# Patient Record
Sex: MALE | Race: WHITE | NOT HISPANIC OR LATINO
[De-identification: names, ages, dates, MRNs, and addresses within clinical notes are randomized per-mention and may not be internally consistent; named-entity substitution may affect disease eponyms.]

---

## 2020-01-03 ENCOUNTER — RECORD ABSTRACTING (OUTPATIENT)
Age: 85
End: 2020-01-03

## 2020-01-03 DIAGNOSIS — Z87.448 PERSONAL HISTORY OF OTHER DISEASES OF URINARY SYSTEM: ICD-10-CM

## 2020-01-03 DIAGNOSIS — M54.16 RADICULOPATHY, LUMBAR REGION: ICD-10-CM

## 2020-01-03 DIAGNOSIS — R39.9 UNSPECIFIED SYMPTOMS AND SIGNS INVOLVING THE GENITOURINARY SYSTEM: ICD-10-CM

## 2020-01-03 DIAGNOSIS — Z87.09 PERSONAL HISTORY OF OTHER DISEASES OF THE RESPIRATORY SYSTEM: ICD-10-CM

## 2020-01-03 DIAGNOSIS — R41.3 OTHER AMNESIA: ICD-10-CM

## 2020-01-03 DIAGNOSIS — I10 ESSENTIAL (PRIMARY) HYPERTENSION: ICD-10-CM

## 2020-01-03 DIAGNOSIS — Z86.2 PERSONAL HISTORY OF DISEASES OF THE BLOOD AND BLOOD-FORMING ORGANS AND CERTAIN DISORDERS INVOLVING THE IMMUNE MECHANISM: ICD-10-CM

## 2020-01-03 DIAGNOSIS — F33.9 MAJOR DEPRESSIVE DISORDER, RECURRENT, UNSPECIFIED: ICD-10-CM

## 2020-01-03 DIAGNOSIS — K21.9 GASTRO-ESOPHAGEAL REFLUX DISEASE W/OUT ESOPHAGITIS: ICD-10-CM

## 2020-01-03 DIAGNOSIS — M96.1 POSTLAMINECTOMY SYNDROME, NOT ELSEWHERE CLASSIFIED: ICD-10-CM

## 2020-01-03 DIAGNOSIS — M79.605 PAIN IN RIGHT LEG: ICD-10-CM

## 2020-01-03 DIAGNOSIS — I87.8 OTHER SPECIFIED DISORDERS OF VEINS: ICD-10-CM

## 2020-01-03 DIAGNOSIS — F45.8 OTHER SOMATOFORM DISORDERS: ICD-10-CM

## 2020-01-03 DIAGNOSIS — Z87.438 PERSONAL HISTORY OF OTHER DISEASES OF MALE GENITAL ORGANS: ICD-10-CM

## 2020-01-03 DIAGNOSIS — Z86.69 PERSONAL HISTORY OF OTHER DISEASES OF THE NERVOUS SYSTEM AND SENSE ORGANS: ICD-10-CM

## 2020-01-03 DIAGNOSIS — Z91.81 HISTORY OF FALLING: ICD-10-CM

## 2020-01-03 DIAGNOSIS — I48.91 UNSPECIFIED ATRIAL FIBRILLATION: ICD-10-CM

## 2020-01-03 DIAGNOSIS — R60.9 EDEMA, UNSPECIFIED: ICD-10-CM

## 2020-01-03 DIAGNOSIS — Z87.19 PERSONAL HISTORY OF OTHER DISEASES OF THE DIGESTIVE SYSTEM: ICD-10-CM

## 2020-01-03 DIAGNOSIS — Z87.898 PERSONAL HISTORY OF OTHER SPECIFIED CONDITIONS: ICD-10-CM

## 2020-01-03 DIAGNOSIS — M48.061 SPINAL STENOSIS, LUMBAR REGION WITHOUT NEUROGENIC CLAUDICATION: ICD-10-CM

## 2020-01-03 DIAGNOSIS — M79.604 PAIN IN RIGHT LEG: ICD-10-CM

## 2020-01-03 PROBLEM — Z00.00 ENCOUNTER FOR PREVENTIVE HEALTH EXAMINATION: Status: ACTIVE | Noted: 2020-01-03

## 2020-01-03 LAB
PSA FREE FLD-MCNC: 25
PSA FREE SERPL-MCNC: 0.5
PSA SERPL-MCNC: 2
TESTOST BND SERPL-MCNC: 202.1

## 2020-01-03 RX ORDER — FLUTICASONE PROPIONATE 50 UG/1
50 SPRAY, METERED NASAL
Refills: 0 | Status: ACTIVE | COMMUNITY

## 2020-01-03 RX ORDER — TAMSULOSIN HYDROCHLORIDE 0.4 MG/1
0.4 CAPSULE ORAL
Refills: 0 | Status: ACTIVE | COMMUNITY

## 2020-01-03 RX ORDER — HYDROXYZINE HYDROCHLORIDE 10 MG/5ML
10 SOLUTION ORAL
Refills: 0 | Status: ACTIVE | COMMUNITY

## 2020-01-03 RX ORDER — LEVETIRACETAM 1000 MG/1
1000 TABLET, FILM COATED ORAL
Refills: 0 | Status: ACTIVE | COMMUNITY

## 2020-01-03 RX ORDER — RIVAROXABAN 15 MG/1
15 TABLET, FILM COATED ORAL
Refills: 0 | Status: ACTIVE | COMMUNITY

## 2020-01-03 RX ORDER — TRIAMCINOLONE ACETONIDE 1 MG/G
0.1 CREAM TOPICAL
Refills: 0 | Status: ACTIVE | COMMUNITY

## 2020-01-03 RX ORDER — FINASTERIDE 5 MG/1
5 TABLET, FILM COATED ORAL
Refills: 0 | Status: ACTIVE | COMMUNITY

## 2020-01-03 RX ORDER — FUROSEMIDE 20 MG/1
20 TABLET ORAL
Refills: 0 | Status: ACTIVE | COMMUNITY

## 2020-01-03 RX ORDER — CROMOLYN SODIUM 5.2 MG/ML
5.2 SPRAY, METERED NASAL
Refills: 0 | Status: ACTIVE | COMMUNITY

## 2020-01-03 RX ORDER — ESCITALOPRAM OXALATE 10 MG/1
10 TABLET ORAL DAILY
Refills: 0 | Status: ACTIVE | COMMUNITY

## 2020-01-03 RX ORDER — CYANOCOBALAMIN (VITAMIN B-12) 1000 MCG
TABLET ORAL
Refills: 0 | Status: ACTIVE | COMMUNITY

## 2020-01-03 RX ORDER — ECONAZOLE NITRATE 10 MG/G
1 CREAM TOPICAL
Refills: 0 | Status: ACTIVE | COMMUNITY

## 2020-01-03 RX ORDER — PYRIDOXINE HCL (VITAMIN B6) 100 MG
100 TABLET ORAL
Refills: 0 | Status: ACTIVE | COMMUNITY

## 2020-01-03 RX ORDER — CHOLECALCIFEROL (VITAMIN D3)
CRYSTALS MISCELLANEOUS
Refills: 0 | Status: ACTIVE | COMMUNITY

## 2020-01-03 RX ORDER — METOPROLOL TARTRATE 25 MG/1
25 TABLET, FILM COATED ORAL
Refills: 0 | Status: ACTIVE | COMMUNITY

## 2020-03-03 ENCOUNTER — APPOINTMENT (OUTPATIENT)
Dept: UROLOGY | Facility: CLINIC | Age: 85
End: 2020-03-03

## 2020-10-22 VITALS
HEART RATE: 83 BPM | OXYGEN SATURATION: 93 % | DIASTOLIC BLOOD PRESSURE: 74 MMHG | RESPIRATION RATE: 18 BRPM | SYSTOLIC BLOOD PRESSURE: 124 MMHG | WEIGHT: 199.96 LBS | TEMPERATURE: 101 F

## 2020-10-22 RX ORDER — PIPERACILLIN AND TAZOBACTAM 4; .5 G/20ML; G/20ML
3.38 INJECTION, POWDER, LYOPHILIZED, FOR SOLUTION INTRAVENOUS ONCE
Refills: 0 | Status: COMPLETED | OUTPATIENT
Start: 2020-10-22 | End: 2020-10-22

## 2020-10-22 RX ORDER — ACETAMINOPHEN 500 MG
650 TABLET ORAL ONCE
Refills: 0 | Status: COMPLETED | OUTPATIENT
Start: 2020-10-22 | End: 2020-10-23

## 2020-10-22 RX ORDER — VANCOMYCIN HCL 1 G
1500 VIAL (EA) INTRAVENOUS ONCE
Refills: 0 | Status: COMPLETED | OUTPATIENT
Start: 2020-10-22 | End: 2020-10-23

## 2020-10-22 RX ORDER — VANCOMYCIN HCL 1 G
1000 VIAL (EA) INTRAVENOUS ONCE
Refills: 0 | Status: DISCONTINUED | OUTPATIENT
Start: 2020-10-22 | End: 2020-10-22

## 2020-10-22 RX ORDER — SODIUM CHLORIDE 9 MG/ML
1000 INJECTION INTRAMUSCULAR; INTRAVENOUS; SUBCUTANEOUS ONCE
Refills: 0 | Status: COMPLETED | OUTPATIENT
Start: 2020-10-22 | End: 2020-10-22

## 2020-10-22 NOTE — ED ADULT TRIAGE NOTE - OTHER COMPLAINTS
per ems found to have a low pulse ox 88% ra, placed on supplemental o2 via nc. pt febrile. presents with b/l lower extremity swelling and redness. son is at bedside, reports patient to be A&o2-3 at baseline with occasional confusion. fs HIGH. pt upgraded to MD.

## 2020-10-23 ENCOUNTER — INPATIENT (INPATIENT)
Facility: HOSPITAL | Age: 85
LOS: 3 days | Discharge: NURSING, REHAB OR LONG-TERM CARE | DRG: 871 | End: 2020-10-27
Attending: STUDENT IN AN ORGANIZED HEALTH CARE EDUCATION/TRAINING PROGRAM | Admitting: HOSPITALIST
Payer: MEDICARE

## 2020-10-23 DIAGNOSIS — L03.115 CELLULITIS OF RIGHT LOWER LIMB: ICD-10-CM

## 2020-10-23 DIAGNOSIS — G40.909 EPILEPSY, UNSPECIFIED, NOT INTRACTABLE, WITHOUT STATUS EPILEPTICUS: ICD-10-CM

## 2020-10-23 DIAGNOSIS — I48.91 UNSPECIFIED ATRIAL FIBRILLATION: ICD-10-CM

## 2020-10-23 DIAGNOSIS — R63.8 OTHER SYMPTOMS AND SIGNS CONCERNING FOOD AND FLUID INTAKE: ICD-10-CM

## 2020-10-23 DIAGNOSIS — E11.9 TYPE 2 DIABETES MELLITUS WITHOUT COMPLICATIONS: ICD-10-CM

## 2020-10-23 DIAGNOSIS — J96.01 ACUTE RESPIRATORY FAILURE WITH HYPOXIA: ICD-10-CM

## 2020-10-23 DIAGNOSIS — G92 TOXIC ENCEPHALOPATHY: ICD-10-CM

## 2020-10-23 DIAGNOSIS — A41.9 SEPSIS, UNSPECIFIED ORGANISM: ICD-10-CM

## 2020-10-23 DIAGNOSIS — J15.6 PNEUMONIA DUE TO OTHER GRAM-NEGATIVE BACTERIA: ICD-10-CM

## 2020-10-23 DIAGNOSIS — L12.0 BULLOUS PEMPHIGOID: ICD-10-CM

## 2020-10-23 LAB
-  STREPTOCOCCUS SP. (NOT GRP A, B OR S PNEUMONIAE): SIGNIFICANT CHANGE UP
A1C WITH ESTIMATED AVERAGE GLUCOSE RESULT: 11.7 % — HIGH (ref 4–5.6)
ALBUMIN SERPL ELPH-MCNC: 3.5 G/DL — SIGNIFICANT CHANGE UP (ref 3.3–5)
ALP SERPL-CCNC: 102 U/L — SIGNIFICANT CHANGE UP (ref 40–120)
ALT FLD-CCNC: 18 U/L — SIGNIFICANT CHANGE UP (ref 10–45)
ANION GAP SERPL CALC-SCNC: 14 MMOL/L — SIGNIFICANT CHANGE UP (ref 5–17)
APPEARANCE UR: CLEAR — SIGNIFICANT CHANGE UP
APTT BLD: 36.8 SEC — HIGH (ref 27.5–35.5)
AST SERPL-CCNC: 27 U/L — SIGNIFICANT CHANGE UP (ref 10–40)
B-OH-BUTYR SERPL-SCNC: 1.4 MMOL/L — HIGH
BACTERIA # UR AUTO: PRESENT /HPF
BASE EXCESS BLDV CALC-SCNC: 2.3 MMOL/L — SIGNIFICANT CHANGE UP
BASOPHILS # BLD AUTO: 0.02 K/UL — SIGNIFICANT CHANGE UP (ref 0–0.2)
BASOPHILS NFR BLD AUTO: 0.2 % — SIGNIFICANT CHANGE UP (ref 0–2)
BILIRUB SERPL-MCNC: 1 MG/DL — SIGNIFICANT CHANGE UP (ref 0.2–1.2)
BILIRUB UR-MCNC: NEGATIVE — SIGNIFICANT CHANGE UP
BUN SERPL-MCNC: 17 MG/DL — SIGNIFICANT CHANGE UP (ref 7–23)
CA-I SERPL-SCNC: 1.09 MMOL/L — LOW (ref 1.12–1.3)
CALCIUM SERPL-MCNC: 9.6 MG/DL — SIGNIFICANT CHANGE UP (ref 8.4–10.5)
CHLORIDE SERPL-SCNC: 98 MMOL/L — SIGNIFICANT CHANGE UP (ref 96–108)
CO2 SERPL-SCNC: 27 MMOL/L — SIGNIFICANT CHANGE UP (ref 22–31)
COLOR SPEC: YELLOW — SIGNIFICANT CHANGE UP
CREAT SERPL-MCNC: 0.92 MG/DL — SIGNIFICANT CHANGE UP (ref 0.5–1.3)
DIFF PNL FLD: ABNORMAL
EOSINOPHIL # BLD AUTO: 0 K/UL — SIGNIFICANT CHANGE UP (ref 0–0.5)
EOSINOPHIL NFR BLD AUTO: 0 % — SIGNIFICANT CHANGE UP (ref 0–6)
EPI CELLS # UR: SIGNIFICANT CHANGE UP /HPF (ref 0–5)
ESTIMATED AVERAGE GLUCOSE: 289 MG/DL — HIGH (ref 68–114)
FLU A RESULT: SIGNIFICANT CHANGE UP
FLU A RESULT: SIGNIFICANT CHANGE UP
FLUAV AG NPH QL: SIGNIFICANT CHANGE UP
FLUBV AG NPH QL: SIGNIFICANT CHANGE UP
GAS PNL BLDV: 135 MMOL/L — LOW (ref 138–146)
GAS PNL BLDV: SIGNIFICANT CHANGE UP
GAS PNL BLDV: SIGNIFICANT CHANGE UP
GLUCOSE BLDC GLUCOMTR-MCNC: 115 MG/DL — HIGH (ref 70–99)
GLUCOSE BLDC GLUCOMTR-MCNC: 142 MG/DL — HIGH (ref 70–99)
GLUCOSE BLDC GLUCOMTR-MCNC: 156 MG/DL — HIGH (ref 70–99)
GLUCOSE BLDC GLUCOMTR-MCNC: 189 MG/DL — HIGH (ref 70–99)
GLUCOSE BLDC GLUCOMTR-MCNC: 241 MG/DL — HIGH (ref 70–99)
GLUCOSE SERPL-MCNC: 556 MG/DL — CRITICAL HIGH (ref 70–99)
GLUCOSE UR QL: >=1000
GRAM STN FLD: SIGNIFICANT CHANGE UP
HCO3 BLDV-SCNC: 28 MMOL/L — HIGH (ref 20–27)
HCT VFR BLD CALC: 41.1 % — SIGNIFICANT CHANGE UP (ref 39–50)
HGB BLD-MCNC: 13.2 G/DL — SIGNIFICANT CHANGE UP (ref 13–17)
IMM GRANULOCYTES NFR BLD AUTO: 8.3 % — HIGH (ref 0–1.5)
INR BLD: 2.22 — HIGH (ref 0.88–1.16)
KETONES UR-MCNC: 40 MG/DL
LACTATE SERPL-SCNC: 1.6 MMOL/L — SIGNIFICANT CHANGE UP (ref 0.5–2)
LACTATE SERPL-SCNC: 2.4 MMOL/L — HIGH (ref 0.5–2)
LACTATE SERPL-SCNC: 2.7 MMOL/L — HIGH (ref 0.5–2)
LACTATE SERPL-SCNC: 3.4 MMOL/L — HIGH (ref 0.5–2)
LEGIONELLA AG UR QL: NEGATIVE — SIGNIFICANT CHANGE UP
LEUKOCYTE ESTERASE UR-ACNC: NEGATIVE — SIGNIFICANT CHANGE UP
LYMPHOCYTES # BLD AUTO: 0.23 K/UL — LOW (ref 1–3.3)
LYMPHOCYTES # BLD AUTO: 1.8 % — LOW (ref 13–44)
MAGNESIUM SERPL-MCNC: 1.7 MG/DL — SIGNIFICANT CHANGE UP (ref 1.6–2.6)
MCHC RBC-ENTMCNC: 32.1 GM/DL — SIGNIFICANT CHANGE UP (ref 32–36)
MCHC RBC-ENTMCNC: 33.9 PG — SIGNIFICANT CHANGE UP (ref 27–34)
MCV RBC AUTO: 105.7 FL — HIGH (ref 80–100)
METHOD TYPE: SIGNIFICANT CHANGE UP
MONOCYTES # BLD AUTO: 0.32 K/UL — SIGNIFICANT CHANGE UP (ref 0–0.9)
MONOCYTES NFR BLD AUTO: 2.5 % — SIGNIFICANT CHANGE UP (ref 2–14)
NEUTROPHILS # BLD AUTO: 11.39 K/UL — HIGH (ref 1.8–7.4)
NEUTROPHILS NFR BLD AUTO: 87.2 % — HIGH (ref 43–77)
NITRITE UR-MCNC: NEGATIVE — SIGNIFICANT CHANGE UP
NRBC # BLD: 0 /100 WBCS — SIGNIFICANT CHANGE UP (ref 0–0)
NT-PROBNP SERPL-SCNC: 1878 PG/ML — HIGH (ref 0–300)
PCO2 BLDV: 46 MMHG — SIGNIFICANT CHANGE UP (ref 41–51)
PH BLDV: 7.4 — SIGNIFICANT CHANGE UP (ref 7.32–7.43)
PH UR: 6 — SIGNIFICANT CHANGE UP (ref 5–8)
PLATELET # BLD AUTO: 96 K/UL — LOW (ref 150–400)
PO2 BLDV: 36 MMHG — SIGNIFICANT CHANGE UP
POTASSIUM BLDV-SCNC: 4.4 MMOL/L — SIGNIFICANT CHANGE UP (ref 3.5–4.9)
POTASSIUM SERPL-MCNC: 4.6 MMOL/L — SIGNIFICANT CHANGE UP (ref 3.5–5.3)
POTASSIUM SERPL-SCNC: 4.6 MMOL/L — SIGNIFICANT CHANGE UP (ref 3.5–5.3)
PROT SERPL-MCNC: 6.2 G/DL — SIGNIFICANT CHANGE UP (ref 6–8.3)
PROT UR-MCNC: ABNORMAL MG/DL
PROTHROM AB SERPL-ACNC: 25.7 SEC — HIGH (ref 10.6–13.6)
RBC # BLD: 3.89 M/UL — LOW (ref 4.2–5.8)
RBC # FLD: 14.6 % — HIGH (ref 10.3–14.5)
RBC CASTS # UR COMP ASSIST: ABNORMAL /HPF
RSV RESULT: SIGNIFICANT CHANGE UP
RSV RNA RESP QL NAA+PROBE: SIGNIFICANT CHANGE UP
SAO2 % BLDV: 64 % — SIGNIFICANT CHANGE UP
SARS-COV-2 RNA SPEC QL NAA+PROBE: SIGNIFICANT CHANGE UP
SODIUM SERPL-SCNC: 139 MMOL/L — SIGNIFICANT CHANGE UP (ref 135–145)
SP GR SPEC: 1.02 — SIGNIFICANT CHANGE UP (ref 1–1.03)
SPECIMEN SOURCE: SIGNIFICANT CHANGE UP
SPECIMEN SOURCE: SIGNIFICANT CHANGE UP
TROPONIN T SERPL-MCNC: <0.01 NG/ML — SIGNIFICANT CHANGE UP (ref 0–0.01)
UROBILINOGEN FLD QL: 0.2 E.U./DL — SIGNIFICANT CHANGE UP
WBC # BLD: 13.04 K/UL — HIGH (ref 3.8–10.5)
WBC # FLD AUTO: 13.04 K/UL — HIGH (ref 3.8–10.5)
WBC UR QL: < 5 /HPF — SIGNIFICANT CHANGE UP

## 2020-10-23 PROCEDURE — 74230 X-RAY XM SWLNG FUNCJ C+: CPT | Mod: 26

## 2020-10-23 PROCEDURE — 71045 X-RAY EXAM CHEST 1 VIEW: CPT | Mod: 26

## 2020-10-23 PROCEDURE — 93010 ELECTROCARDIOGRAM REPORT: CPT

## 2020-10-23 PROCEDURE — 93306 TTE W/DOPPLER COMPLETE: CPT | Mod: 26

## 2020-10-23 PROCEDURE — 99223 1ST HOSP IP/OBS HIGH 75: CPT | Mod: GC,AI

## 2020-10-23 PROCEDURE — 70450 CT HEAD/BRAIN W/O DYE: CPT | Mod: 26

## 2020-10-23 PROCEDURE — 99291 CRITICAL CARE FIRST HOUR: CPT | Mod: CS,25

## 2020-10-23 RX ORDER — SODIUM CHLORIDE 9 MG/ML
1000 INJECTION INTRAMUSCULAR; INTRAVENOUS; SUBCUTANEOUS ONCE
Refills: 0 | Status: COMPLETED | OUTPATIENT
Start: 2020-10-23 | End: 2020-10-23

## 2020-10-23 RX ORDER — DEXTROSE 50 % IN WATER 50 %
12.5 SYRINGE (ML) INTRAVENOUS ONCE
Refills: 0 | Status: DISCONTINUED | OUTPATIENT
Start: 2020-10-23 | End: 2020-10-27

## 2020-10-23 RX ORDER — RIVAROXABAN 15 MG-20MG
1 KIT ORAL
Qty: 0 | Refills: 0 | DISCHARGE

## 2020-10-23 RX ORDER — GLUCAGON INJECTION, SOLUTION 0.5 MG/.1ML
1 INJECTION, SOLUTION SUBCUTANEOUS ONCE
Refills: 0 | Status: DISCONTINUED | OUTPATIENT
Start: 2020-10-23 | End: 2020-10-27

## 2020-10-23 RX ORDER — FINASTERIDE 5 MG/1
1 TABLET, FILM COATED ORAL
Qty: 0 | Refills: 0 | DISCHARGE

## 2020-10-23 RX ORDER — DEXTROSE 50 % IN WATER 50 %
25 SYRINGE (ML) INTRAVENOUS ONCE
Refills: 0 | Status: DISCONTINUED | OUTPATIENT
Start: 2020-10-23 | End: 2020-10-27

## 2020-10-23 RX ORDER — SODIUM CHLORIDE 9 MG/ML
1000 INJECTION, SOLUTION INTRAVENOUS
Refills: 0 | Status: DISCONTINUED | OUTPATIENT
Start: 2020-10-23 | End: 2020-10-27

## 2020-10-23 RX ORDER — TAMSULOSIN HYDROCHLORIDE 0.4 MG/1
1 CAPSULE ORAL
Qty: 0 | Refills: 0 | DISCHARGE

## 2020-10-23 RX ORDER — ACETAMINOPHEN 500 MG
650 TABLET ORAL EVERY 6 HOURS
Refills: 0 | Status: DISCONTINUED | OUTPATIENT
Start: 2020-10-23 | End: 2020-10-27

## 2020-10-23 RX ORDER — FOLIC ACID 0.8 MG
1 TABLET ORAL
Qty: 0 | Refills: 0 | DISCHARGE

## 2020-10-23 RX ORDER — INSULIN HUMAN 100 [IU]/ML
10 INJECTION, SOLUTION SUBCUTANEOUS ONCE
Refills: 0 | Status: COMPLETED | OUTPATIENT
Start: 2020-10-23 | End: 2020-10-23

## 2020-10-23 RX ORDER — AZITHROMYCIN 500 MG/1
500 TABLET, FILM COATED ORAL EVERY 24 HOURS
Refills: 0 | Status: DISCONTINUED | OUTPATIENT
Start: 2020-10-23 | End: 2020-10-24

## 2020-10-23 RX ORDER — TAMSULOSIN HYDROCHLORIDE 0.4 MG/1
0.4 CAPSULE ORAL AT BEDTIME
Refills: 0 | Status: DISCONTINUED | OUTPATIENT
Start: 2020-10-23 | End: 2020-10-27

## 2020-10-23 RX ORDER — CEFTRIAXONE 500 MG/1
2000 INJECTION, POWDER, FOR SOLUTION INTRAMUSCULAR; INTRAVENOUS EVERY 24 HOURS
Refills: 0 | Status: DISCONTINUED | OUTPATIENT
Start: 2020-10-24 | End: 2020-10-27

## 2020-10-23 RX ORDER — LEVETIRACETAM 250 MG/1
750 TABLET, FILM COATED ORAL
Refills: 0 | Status: DISCONTINUED | OUTPATIENT
Start: 2020-10-23 | End: 2020-10-27

## 2020-10-23 RX ORDER — LEVETIRACETAM 250 MG/1
1 TABLET, FILM COATED ORAL
Qty: 0 | Refills: 0 | DISCHARGE

## 2020-10-23 RX ORDER — DEXTROSE 50 % IN WATER 50 %
15 SYRINGE (ML) INTRAVENOUS ONCE
Refills: 0 | Status: DISCONTINUED | OUTPATIENT
Start: 2020-10-23 | End: 2020-10-27

## 2020-10-23 RX ORDER — FINASTERIDE 5 MG/1
5 TABLET, FILM COATED ORAL DAILY
Refills: 0 | Status: DISCONTINUED | OUTPATIENT
Start: 2020-10-23 | End: 2020-10-27

## 2020-10-23 RX ORDER — METOPROLOL TARTRATE 50 MG
25 TABLET ORAL
Refills: 0 | Status: DISCONTINUED | OUTPATIENT
Start: 2020-10-23 | End: 2020-10-23

## 2020-10-23 RX ORDER — SACCHAROMYCES BOULARDII 250 MG
1 POWDER IN PACKET (EA) ORAL
Qty: 0 | Refills: 0 | DISCHARGE

## 2020-10-23 RX ORDER — FOLIC ACID 0.8 MG
1 TABLET ORAL DAILY
Refills: 0 | Status: DISCONTINUED | OUTPATIENT
Start: 2020-10-23 | End: 2020-10-27

## 2020-10-23 RX ORDER — RIVAROXABAN 15 MG-20MG
10 KIT ORAL
Refills: 0 | Status: DISCONTINUED | OUTPATIENT
Start: 2020-10-23 | End: 2020-10-27

## 2020-10-23 RX ORDER — CEFTRIAXONE 500 MG/1
1000 INJECTION, POWDER, FOR SOLUTION INTRAMUSCULAR; INTRAVENOUS EVERY 24 HOURS
Refills: 0 | Status: DISCONTINUED | OUTPATIENT
Start: 2020-10-23 | End: 2020-10-23

## 2020-10-23 RX ORDER — METOPROLOL TARTRATE 50 MG
12.5 TABLET ORAL
Refills: 0 | Status: DISCONTINUED | OUTPATIENT
Start: 2020-10-23 | End: 2020-10-27

## 2020-10-23 RX ORDER — CEFTRIAXONE 500 MG/1
1000 INJECTION, POWDER, FOR SOLUTION INTRAMUSCULAR; INTRAVENOUS ONCE
Refills: 0 | Status: COMPLETED | OUTPATIENT
Start: 2020-10-23 | End: 2020-10-23

## 2020-10-23 RX ORDER — INSULIN LISPRO 100/ML
VIAL (ML) SUBCUTANEOUS
Refills: 0 | Status: DISCONTINUED | OUTPATIENT
Start: 2020-10-23 | End: 2020-10-27

## 2020-10-23 RX ADMIN — RIVAROXABAN 10 MILLIGRAM(S): KIT at 12:27

## 2020-10-23 RX ADMIN — PIPERACILLIN AND TAZOBACTAM 200 GRAM(S): 4; .5 INJECTION, POWDER, LYOPHILIZED, FOR SOLUTION INTRAVENOUS at 00:29

## 2020-10-23 RX ADMIN — CEFTRIAXONE 100 MILLIGRAM(S): 500 INJECTION, POWDER, FOR SOLUTION INTRAMUSCULAR; INTRAVENOUS at 12:28

## 2020-10-23 RX ADMIN — Medication 300 MILLIGRAM(S): at 00:43

## 2020-10-23 RX ADMIN — Medication 4 MILLIGRAM(S): at 18:10

## 2020-10-23 RX ADMIN — SODIUM CHLORIDE 1000 MILLILITER(S): 9 INJECTION INTRAMUSCULAR; INTRAVENOUS; SUBCUTANEOUS at 00:14

## 2020-10-23 RX ADMIN — AZITHROMYCIN 500 MILLIGRAM(S): 500 TABLET, FILM COATED ORAL at 12:29

## 2020-10-23 RX ADMIN — CEFTRIAXONE 100 MILLIGRAM(S): 500 INJECTION, POWDER, FOR SOLUTION INTRAMUSCULAR; INTRAVENOUS at 18:10

## 2020-10-23 RX ADMIN — Medication 650 MILLIGRAM(S): at 00:29

## 2020-10-23 RX ADMIN — SODIUM CHLORIDE 1000 MILLILITER(S): 9 INJECTION INTRAMUSCULAR; INTRAVENOUS; SUBCUTANEOUS at 03:58

## 2020-10-23 RX ADMIN — Medication 1 MILLIGRAM(S): at 18:07

## 2020-10-23 RX ADMIN — FINASTERIDE 5 MILLIGRAM(S): 5 TABLET, FILM COATED ORAL at 12:29

## 2020-10-23 RX ADMIN — INSULIN HUMAN 10 UNIT(S): 100 INJECTION, SOLUTION SUBCUTANEOUS at 02:57

## 2020-10-23 RX ADMIN — Medication 1 TABLET(S): at 13:33

## 2020-10-23 RX ADMIN — Medication 4: at 22:12

## 2020-10-23 RX ADMIN — INSULIN HUMAN 10 UNIT(S): 100 INJECTION, SOLUTION SUBCUTANEOUS at 01:33

## 2020-10-23 RX ADMIN — Medication 2: at 18:07

## 2020-10-23 RX ADMIN — Medication 2: at 09:41

## 2020-10-23 RX ADMIN — LEVETIRACETAM 750 MILLIGRAM(S): 250 TABLET, FILM COATED ORAL at 18:07

## 2020-10-23 NOTE — SWALLOW VFSS/MBS ASSESSMENT ADULT - ADDITIONAL RECOMMENDATIONS
1) Per radiologist, recommend esophagram to further evaluate filling defect.  2) Consider GI consult for further evaluation of soft tissue irregularity in cervical esophagus.  3) Determine long-term GOC given pt's desire to eat/drink. If GOC are to continue an unrestricted diet for pleasure/QOL, despite risks of asp/PNA, pls allow same.

## 2020-10-23 NOTE — H&P ADULT - PROBLEM SELECTOR PLAN 9
prior history of seizure disorder, last seizure was 1 year, patient would have difficulty articulate, but cannot speak. Dr. Rizzo ([facility])  - c/w levetiracetam 750 mg PO BID

## 2020-10-23 NOTE — H&P ADULT - ASSESSMENT
89M with atrial fibrillation (on Xarelto 10 mg daily), spinal stenosis, bullous pemphigoid, b/l LE insufficiency w/ ulceration, DM, history of seizures, CVA, presents with altered mental status and fever. Patient has a L basilar opacity on CXR. Patient also has difficulty swallowing by nursing staff - and wife states he always chokes on water on occasion. Concern for chronic microaspiration, no known sick contacts or recent travel. Leukocytosis may be from steroid use, but will treat empirically for CAP

## 2020-10-23 NOTE — SWALLOW BEDSIDE ASSESSMENT ADULT - SWALLOW EVAL: DIAGNOSIS
Pt p/w suspected decreased airway protection, resulting in an inconsistent wet vocal quality after thin liquids. Given inconsistent clinical presentation, recommend further evaluation of swallow via Modified Barium Swallow Study (MBS/VFSS). Pt/wife are in agreement.

## 2020-10-23 NOTE — PHYSICAL THERAPY INITIAL EVALUATION ADULT - PHYSICAL ASSIST/NONPHYSICAL ASSIST, REHAB EVAL
Chief Complaint   Patient presents with   [facility] Follow Up     CABG in April- soboe 1 person assist/verbal cues

## 2020-10-23 NOTE — PROVIDER CONTACT NOTE (CHANGE IN STATUS NOTIFICATION) - ACTION/TREATMENT ORDERED:
02 2L NC continued, 500 bolus given. warm packs given to patient. Pt. made DNR-DNI, spouse at bed side. Pt comfortable at this present time, going to be transported to [facility].

## 2020-10-23 NOTE — H&P ADULT - HISTORY OF PRESENT ILLNESS
89M with atrial fibrillation (on Xarelto 10 mg daily), spinal stenosis, bullous pemphigoid, b/l LE insufficiency w/ ulceration, DM, history of seizures, CVA, presents with altered mental status and fever. History obtained from overnight ED provider documentation and wife, given patient is a poor historian. Wife states that patient did not act like himself overnight, was noted to be sitting on the toilet for approximately 3 hours. Patient did not fall (however, per patient, he states he does fall on occasion). Wife denied any head trauma, nausea/vomiting, diarrhea. She assists him with his medications daily. Patient himself states he "feels well," and is not aware of the reason why he is in the hospital, nor does he realize he was in the hospital. Patient denies any fever, chills, nausea, vomiting, changes in bowel habits, changes in urinary habits - he does not recollect sitting on the toilet for that long of a period yesterday.    ED vitals: T 100.5, HR , /74, RR 18, O2 sat 93% 6 LPM NC  ED labs: WBC 13.04, Hb 13.2, Plt 96, neutro 87.2%, INR 2.22, Lactate 3.4-> 2.4, Gluc 556, A1c 11.7, Betahydroxy 1.4, BNP 1878; U/A +blood, bacteria, ketone 40, gluc >1000; COVID-19 PCR neg, RSV neg  ED studies: CT head no acute intracranial hemorrhage. CXR cardiomegaly, thoracic aortic calcification, L basilar opacity/pleural effusion 89M with atrial fibrillation (on Xarelto 10 mg daily), spinal stenosis, bullous pemphigoid, b/l LE insufficiency w/ ulceration, DM, history of seizures presents with altered mental status and fever. History obtained from overnight ED provider documentation and wife, given patient is a poor historian. Wife states that patient did not act like himself overnight, was noted to be sitting on the toilet for approximately 3 hours. Patient did not fall (however, per patient, he states he does fall on occasion). Wife denied any head trauma, nausea/vomiting, diarrhea. She assists him with his medications daily. Patient himself states he "feels well," and is not aware of the reason why he is in the hospital, nor does he realize he was in the hospital. Patient denies any fever, chills, nausea, vomiting, changes in bowel habits, changes in urinary habits - he does not recollect sitting on the toilet for that long of a period yesterday.    ED vitals: T 100.5, HR , /74, RR 18, O2 sat 93% 6 LPM NC  ED labs: WBC 13.04, Hb 13.2, Plt 96, neutro 87.2%, INR 2.22, Lactate 3.4-> 2.4, Gluc 556, A1c 11.7, Betahydroxy 1.4, BNP 1878; U/A +blood, bacteria, ketone 40, gluc >1000; COVID-19 PCR neg, RSV neg  ED studies: CT head no acute intracranial hemorrhage. CXR cardiomegaly, thoracic aortic calcification, L basilar opacity/pleural effusion

## 2020-10-23 NOTE — ED PROVIDER NOTE - SECONDARY DIAGNOSIS.
Fever, unspecified fever cause Altered mental status, unspecified altered mental status type Hyperglycemia Lung infiltrate Cellulitis of right lower extremity

## 2020-10-23 NOTE — H&P ADULT - PROBLEM SELECTOR PLAN 10
F: no IVF  E: K>4 Mg>2, monitor and replete as needed  N: NPO pending S&S evaluation  Ppx: rivoraoxaban 10 mg daily for afib  D: RMF  FULL CODE

## 2020-10-23 NOTE — H&P ADULT - PROBLEM SELECTOR PLAN 6
hx of bullous pemphigoid, on weekly methotrexate 7.5 mg (on Fridays), and a prednisone taper (previously on 5 mg BID, now on 4 mg BID for 4 weeks)  - c/w prednisone 4mg PO BID  - obtain more collateral from PCP nausea hx of bullous pemphigoid, on weekly methotrexate 7.5 mg (on Fridays), and a prednisone taper (previously on 5 mg BID, now on 4 mg BID for 4 weeks)  - c/w prednisone 4mg PO BID  - obtain more collateral from PCP  - holding methotrexate 7.5 mg weekly (dosed Fridays)

## 2020-10-23 NOTE — SWALLOW VFSS/MBS ASSESSMENT ADULT - ORAL PHASE COMMENTS
Lingual tremor noted (which worsened as study progressed). Prolonged mastication. Decreased control of bolus. Difficulty w bolus formation and A-P transport. Posterior premature bolus loss (worst w thin liquids). Increasing severity of lingual residue (which pt was not sensate to), worst w solids, as PO trials progressed, possibly 2/2 fatigue.

## 2020-10-23 NOTE — SWALLOW VFSS/MBS ASSESSMENT ADULT - MODE OF PRESENTATION
4 tsp of thin liq by spoon, 2 cup sips of thin liq, 3 tsp of NTL by spoon, 3 cup sips of NTL, 6 tsp of pudding, 1 bite of regular solid

## 2020-10-23 NOTE — CONSULT NOTE ADULT - ASSESSMENT
89YOM with PMH of Afib, Spinal Stenosis, Bullous Pemphigoid (on steroids), CVA (dementia baseline no specific neurological deficits), Chronic LE b/l Ulcers presenting for worsening AMS from baseline and fever, found to be in severe sepsis 2/2 likely CAP/vs aspiration PNA c/b hyperglycemia.     #Severe sepsis:     DISPO: Plan discussed with ICU attending. 89YOM with PMH of Afib, Spinal Stenosis, Bullous Pemphigoid (on steroids), CVA (dementia baseline no specific neurological deficits), Chronic LE b/l Ulcers presenting for worsening AMS from baseline and fever, found to be in severe sepsis 2/2 likely CAP/vs aspiration PNA c/b hyperglycemia.     #Severe sepsis:   Patient presenting with 2/4 SIRS criteria including fever & Leukocytosis, with elevated lactate, source likely 2/2 CAP/Aspiration PNA vs less likely b/l LE cellulitis. Pt s/p vanc/zosyn and only 1L NS by ED with concern for overloading patient without known EF. UA neg. CXR with likely LLL infiltrate with b/l effusions. Flu/RSV/Covid neg.   - Recommend 2nd 1L NS bolus   - Recommend CFTX/Azithro for CAP/and covers anaerobes   - Obtain BCX   - C/w CBC w/ diff qd  - Tylenol PRN for fever   - NPO and Speech/Swallow eval     #Hyperglycemia:   Patient with reported pre-DM2 as per son, found to have hyperglycemia in 500s range upon presentation s/p 1L NS and 10U regular insulinx2, with improvement in 200s, likely trigger by current septic state.   - C/w miSS  - F/u A1C     FULL CODE (Conversation held via phone with son HCP Adelfo Florez. Of note wife also HCP. Information in patient info)   DISPO: Pending. Plan discussed with ICU attending. 89YOM with PMH of Afib, Spinal Stenosis, Bullous Pemphigoid (on steroids), CVA (dementia baseline no specific neurological deficits), Chronic LE b/l Ulcers presenting for worsening AMS from baseline and fever, found to be in severe sepsis 2/2 likely CAP/vs aspiration PNA c/b hyperglycemia.     #Severe sepsis:   Patient presenting with 2/4 SIRS criteria including fever & Leukocytosis, with elevated lactate, source likely 2/2 CAP/Aspiration PNA vs less likely b/l LE cellulitis. Pt s/p vanc/zosyn and only 1L NS by ED with concern for overloading patient without known EF. UA neg. CXR with likely LLL infiltrate with b/l effusions. Flu/RSV/Covid neg.   - Recommend 2nd 1L NS bolus   - Recommend CFTX/Azithro for CAP/and covers anaerobes   - Obtain BCX   - C/w CBC w/ diff qd  - Tylenol PRN for fever   - NPO and Speech/Swallow eval     #Hyperglycemia:   Patient with reported pre-DM2 as per son, found to have hyperglycemia in 500s range upon presentation s/p 1L NS and 10U regular insulinx2, with improvement in 200s, likely trigger by current septic state.   - C/w miSS  - F/u A1C     FULL CODE (Conversation held via phone with son HCP Adelfo Florez. Of note wife also HCP. Information in patient info)   DISPO: Regional medical floor. Plan discussed with ICU attending.

## 2020-10-23 NOTE — SWALLOW VFSS/MBS ASSESSMENT ADULT - ESOPHAGEAL STAGE
Soft tissue irregularity noted in the cervical esophagus. Per radiologist, a filling defect in the posterior cervical esophagus also noted, primarily in the latter trials.

## 2020-10-23 NOTE — PROVIDER CONTACT NOTE (CHANGE IN STATUS NOTIFICATION) - BACKGROUND
89M with atrial fibrillation, spinal stenosis, bullous pemphigoid, b/l LE insufficiency w/ ulceration, DM, history of seizures, CVA, presents with altered mental status and fever.

## 2020-10-23 NOTE — ED PROVIDER NOTE - OBJECTIVE STATEMENT
89M hx afib (on xarelto), bullous pemphigoid, spinal stenosis, BIBEMS for AMS.  per wife pt was not acting like himself tonight. states he went to the bathroom and was unable to get up off the toilet for 3hrs.  no head trauma. no vomiting, no diarrhea. wife states he has chronic lower leg swelling. states he is chronically on prednisone (was told recently to lower dosage as his sugar was elevated).

## 2020-10-23 NOTE — CONSULT NOTE ADULT - SUBJECTIVE AND OBJECTIVE BOX
HPI: 89M with atrial fibrillation (on Xarelto 10 mg daily), spinal stenosis, bullous pemphigoid, b/l LE insufficiency w/ ulceration, DM, history of seizures presents with altered mental status and fever. History obtained from overnight ED provider documentation and wife, given patient is a poor historian. Wife states that patient did not act like himself overnight, was noted to be sitting on the toilet for approximately 3 hours. Patient did not fall (however, per patient, he states he does fall on occasion). Wife denied any head trauma, nausea/vomiting, diarrhea. She assists him with his medications daily. Patient himself states he "feels well," and is not aware of the reason why he is in the hospital, nor does he realize he was in the hospital. Patient denies any fever, chills, nausea, vomiting, changes in bowel habits, changes in urinary habits - he does not recollect sitting on the toilet for that long of a period yesterday.    #Systolic ejection murmur  3/ with radiation to carotid, RUSB, likely AS. Patient with frequent falls as well, concern for ?severe AS, patient is also on a BB and diuretic, ?history of HF  - echocardiogram  - obtain collateral from PCP  - lasix 20 mg PO as needed  - c/w metoprolol tartrate 12.5 mg BID with holding parameters, uptitrate to 25 mg BID (home dose) when pt BP tolerates  ED vitals: T 100.5, HR , /74, RR 18, O2 sat 93% 6 LPM NC  ED labs: WBC 13.04, Hb 13.2, Plt 96, neutro 87.2%, INR 2.22, Lactate 3.4-> 2.4, Gluc 556, A1c 11.7, Betahydroxy 1.4, BNP 1878; U/A +blood, bacteria, ketone 40, gluc >1000; COVID-19 PCR neg, RSV neg  ED studies: CT head no acute intracranial hemorrhage. CXR cardiomegaly, thoracic aortic calcification, L basilar opacity/pleural effusion   Bullous pemphigoid. Plan: hx of bullous pemphigoid, on weekly methotrexate 7.5 mg (on ), and a prednisone taper (previously on 5 mg BID, now on 4 mg BID for 4 weeks)  - c/w prednisone 4mg PO BID  - obtain more collateral from PCP  - holding methotrexate 7.5 mg weekly (dosed ).    Patient with reported pre-DM2 as per son, found to have hyperglycemia in 500s range upon presentation s/p 1L NS and 10U regular insulinx2, with improvement in 200s, likely trigger by current septic state.   FSG & insulin:    Dinner FSG   Lispro   +    Ate  Bedtime FSG     Lantus      and Lispro         Breakfast FSG   Lispro    +    Ate  Lunch FSG      Lispro    +    Ate      Age at Dx:  How dx:  Hx and duration of insulin:  Current Therapy:  Hx of other regimens:    Home FSG:  Fasting  Lunch  Dinner  Bed    Diet:  Exercise:    Hx of hypoglycemia:  Hx of DKA/HHS:  Complications:  Outpatient Endo:    FH:  DM:  Thyroid:  Autoimmune:  Other:    SH:  Patient denies any tobacco history, no longer drinks alcohol, and denies illicit drug use. Retired       PMH & Surgical Hx:AMS; SEPSIS    Handoff    MEWS Score    Chronic venous insufficiency    Seizure disorder    Cerebrovascular accident (CVA)    Bullous pemphigoid    Spinal stenosis    Atrial fibrillation    Sepsis, due to unspecified organism, unspecified whether acute organ dysfunction present    Nutrition, metabolism, and development symptoms    Seizure disorder    Toxic metabolic encephalopathy    Diabetes mellitus    Bullous pemphigoid    Atrial fibrillation    Cellulitis of right lower extremity    Gram-negative pneumonia    Acute hypoxemic respiratory failure    Severe sepsis    AMS    Lung infiltrate    Cellulitis of right lower extremity    Hyperglycemia    Altered mental status, unspecified altered mental status type    Fever, unspecified fever cause    [email or web address]            Current Meds:  acetaminophen   Tablet .. 650 milliGRAM(s) Oral every 6 hours PRN  azithromycin   Tablet 500 milliGRAM(s) Oral every 24 hours  cefTRIAXone   IVPB 1000 milliGRAM(s) IV Intermittent every 24 hours  dextrose 40% Gel 15 Gram(s) Oral once PRN  dextrose 5%. 1000 milliLiter(s) IV Continuous <Continuous>  dextrose 50% Injectable 12.5 Gram(s) IV Push once  dextrose 50% Injectable 25 Gram(s) IV Push once  dextrose 50% Injectable 25 Gram(s) IV Push once  finasteride 5 milliGRAM(s) Oral daily  glucagon  Injectable 1 milliGRAM(s) IntraMuscular once PRN  insulin lispro (ADMELOG) corrective regimen sliding scale   SubCutaneous Before meals and at bedtime  levETIRAcetam 750 milliGRAM(s) Oral two times a day  metoprolol tartrate 12.5 milliGRAM(s) Oral two times a day  multivitamin  Chewable 1 Tablet(s) Oral daily  predniSONE   Tablet 4 milliGRAM(s) Oral two times a day  rivaroxaban 10 milliGRAM(s) Oral <User Schedule>  tamsulosin 0.4 milliGRAM(s) Oral at bedtime      Allergies:  No Known Allergies      ROS:  Denies the following except as indicated.    General: weight loss/weight gain, decreased appetite, fatigue  Eyes: Blurry vision, double vision, visual changes  ENT: Throat pain, changes in voice,   CV: palpitations, SOB, CP, cough  GI: NVD, difficulty swallowing, abdominal pain  : polyuria, dysuria  Endo: abnormal menses, temperature intolerance, decreased libido  MSK: weakness, joint pain  Skin: rash, dryness, diaphoresis  Heme: Easy bruising, bleeding  Neuro: HA, dizziness, lightheadedness, numbness/ tingling  Psych: Anxiety, Depression    Vital Signs Last 24 Hrs  T(C): 36.6 (23 Oct 2020 09:47), Max: 38.1 (22 Oct 2020 23:32)  T(F): 97.8 (23 Oct 2020 09:47), Max: 100.5 (22 Oct 2020 23:32)  HR: 92 (23 Oct 2020 09:47) (83 - 108)  BP: 93/60 (23 Oct 2020 09:47) (87/53 - 124/74)  BP(mean): 67 (23 Oct 2020 02:30) (67 - 83)  RR: 20 (23 Oct 2020 09:47) (17 - 22)  SpO2: 96% (23 Oct 2020 09:47) (93% - 100%)  Height (cm): 172.7 (10-23 @ 09:46)  Weight (kg): 90.7 (10-22 @ 23:32)  BMI (kg/m2): 30.4 (10-23 @ 09:46)      Constitutional: wn/wd in NAD.   HEENT: NCAT, MMM, OP clear, EOMI, , no proptosis or lid retraction  Neck: no thyromegaly or palpable thyroid nodules   Respiratory: lungs CTAB.  Cardiovascular: regular rhythm, normal S1 and S2, no audible murmurs, no peripheral edema  GI: soft, NT/ND, no masses/HSM appreciated.  Neurology: no tremors, DTR 2+  Skin: no visible rashes/lesions. no acanthosis nigricans. no hyperlipotrophy. no cushing's stigmata.  Psychiatric: AAO x 3, normal affect/mood.  Ext: radial pulses intact, DP pulses intact, extremities warm, no cyanosis, clubbing or edema.       LABS:                        13.2   13.04 )-----------( 89       ( 23 Oct 2020 00:20 )             41.1     10-    139  |  98  |  17  ----------------------------<  556<HH>  4.6   |  27  |  0.92    Ca    9.6      23 Oct 2020 00:20  Mg     1.7     10-    TPro  6.2  /  Alb  3.5  /  TBili  1.0  /  DBili  x   /  AST  27  /  ALT  18  /  AlkPhos  102  10-23    PT/INR - ( 23 Oct 2020 00:20 )   PT: 25.7 sec;   INR: 2.22          PTT - ( 23 Oct 2020 00:20 )  PTT:36.8 sec  Urinalysis Basic - ( 23 Oct 2020 01:58 )    Color: Yellow / Appearance: Clear / S.020 / pH: x  Gluc: x / Ketone: 40 mg/dL  / Bili: Negative / Urobili: 0.2 E.U./dL   Blood: x / Protein: Trace mg/dL / Nitrite: NEGATIVE   Leuk Esterase: NEGATIVE / RBC: 5-10 /HPF / WBC < 5 /HPF   Sq Epi: x / Non Sq Epi: 0-5 /HPF / Bacteria: Present /HPF            RADIOLOGY & ADDITIONAL STUDIES:  CAPILLARY BLOOD GLUCOSE      POCT Blood Glucose.: 156 mg/dL (23 Oct 2020 08:49)  POCT Blood Glucose.: 286 mg/dL (23 Oct 2020 03:56)  POCT Blood Glucose.: 389 mg/dL (23 Oct 2020 02:12)      Will discuss in rounds  A/P:89M with atrial fibrillation (on Xarelto 10 mg daily), spinal stenosis, bullous pemphigoid, b/l LE insufficiency w/ ulceration, DM, history of seizures, CVA, presents with altered mental status and fever. Patient has a L basilar opacity on CXR. Patient also has difficulty swallowing by nursing staff - and wife states he always chokes on water on occasion. Concern for chronic microaspiration, no known sick contacts or recent travel. Leukocytosis may be from steroid use, but will treat empirically for CAP    1.  DM -   A1C:  Weight:  Cr/GRF:  ER:    Please continue lantus       units at night / morning.  Please continue lispro      units before each meal.  Please continue lispro moderate / low dose sliding scale four times daily with meals and at bedtime    Pt's fingerstick glucose goal is     Will continue to monitor     For discharge, pt can continue    Pt can follow up at discharge with [facility] by calling  to make an appointment.   Will discuss case with     and update primary team    To Make an appointment at [address] [place] [address] for the patient, either:  1. Send a STAT task via Love[employer] to Elva Kumar or Sima Gómez (office managers)   OR  2. Call supervisor's line. P: [phone] (do not give this number to patient). VM is checked periodically.  In the message, specify that this is a hospital discharge follow-up, and that the appt can be made with a NP if there is no timely MD availability.    REMINDERS FOR INSULIN/DIABETES SUPPLIES at DISCHARGE:  INSULIN:   Long actin/Basal Insulin: Examples: Toujeo, Basaglar, Tresiba, Lantus   Short acting/Bolus Insulin: Humalog, Admelog, Novolog  Please ensure that BOTH short acting and long acting insulin are prescribed in the same preparation (Ex: PEN vs VIAL/SOLUTION)     TESTING SUPPLIES:   All glucometer supplies should be written as generic to avoid issues with insurance. Use the free text option in sunrise prescription writer, and type in glucometer test strips, lancets, etc to order.    If sending patient home on insulin PEN, please send:   •	BD sophy insulin pen needles for use up to 4 times daily (total quantity 100)  •	Lancets for use up to 4 times daily (total quantity 100)  •	Glucometer Test strips for use up to 4 times daily (total quantity 100)  •	Alcohol swabs for use up to 4 times daily (total quantity 100)  •	Glucometer (If provided by hospital, still provide scripts for lancets, test strips, and swabs)  If sending patient home on insulin VIAL, please send:   •	Insulin syringes (6mm) - for use up to 4 times daily (total quantity 100)  •	Lancets for use up to 4 times daily (total quantity 100)  •	Generic Glucometer Test strips for use up to 4 times daily (total quantity 100)  •	Alcohol swabs for use up to 4 times daily (total quantity 100)  •	Generic Glucometer (If provided by hospital, still provide scripts for lancets, test strips, and swabs)  •	Do not specify brand for testing supplies (such as contour, freestyle, one touch etc) that way the pharmacy has the freedom to pick and change according to what the insurance dictates.  For patients without insurance:   •	Provide social work with appropriate scripts so they may obtain 1 week of samples  •	Provide with glucometer. Glucometers are located at various nursing stations, the nursing office, education, and endocrine fellows office.  •	Please make appointment with Patrizia Contreras NP or Porsha Godwin RN and MOOSE Forbes at the [address] [place] [address] endocrinology clinic. They can see patients without insurance, provide appropriate samples, and assist in getting insurance coverage.     PREFERRED PHARMACY:  [facility] (located on 1st floor next to admitting)  P: [phone]  Hours: M – F 8AM – 8PM, Sat 8AM – 4PM, Sun—closed  If not using [facility], please follow up with chosen pharmacy to ensure insulin prescribed is covered.        HPI: 89M with atrial fibrillation (on Xarelto 10 mg daily), spinal stenosis, bullous pemphigoid, b/l LE insufficiency w/ ulceration, DM, history of seizures presents with altered mental status and fever. History obtained from overnight ED provider documentation and wife, given patient is a poor historian. Wife states that patient did not act like himself overnight, was noted to be sitting on the toilet for approximately 3 hours. Patient did not fall (however, per patient, he states he does fall on occasion). Wife denied any head trauma, nausea/vomiting, diarrhea. She assists him with his medications daily. Patient himself states he "feels well," and is not aware of the reason why he is in the hospital, nor does he realize he was in the hospital. Patient denies any fever, chills, nausea, vomiting, changes in bowel habits, changes in urinary habits - he does not recollect sitting on the toilet for that long of a period yesterday.  89YOM with PMH of Afib, Spinal Stenosis, Bullous Pemphigoid (on steroids), CVA (dementia baseline no specific neurological deficits), Chronic LE b/l Ulcers presenting for worsening AMS from baseline and fever.   - Spoke to Son (HCP) on phone as patient unable to provide significant history except for intermittently answering questions on ROS. Son mentions patient at baseline oriented AAox3, intermittently confused/forgetful, but today noted to have an acute worsening of mental status, found to be seated on toilet for hours, lethargic, refusing to get up. On ROS found to be warm upon palpation likely febrile, and possibly worsening of chronic b/l LE ulcers, otherwise no other infective symptoms reported. As per patient, his only positive ROS was for cough, but otherwise denied any SOB, abd pain, diarrhea, urinary complaints (wears a diaper), no neck pain.   - At ED vitals: T 100.8, HR 80s, BP /50-70s, R 18 (reported 88%RA --> 93% 6L NC on flowsheets) (on 2L NC  99% when I assessed at bedside)  - Labs s/f: WBC 13 Neut predominance Lactate 2.4--> 2.4, PLT 96, Glu 556 BHB 1.4, VBG  pH 7.4, BNP 1800s, UA ketonuria/glucosuria (no WBCs)   - CTH- neg for acute process  - CXR: Pending read, b/l possible effusions with LLL consolidation   - Pt given: Vanco 1500x1, Zosyn 3.375, Tylenol 650, 1L NS, 10U regular insulin x2     - ED consulted ICU for Severe Sepsis with drop in Blood pressure (although only given 1L at this time as per ED concerned as no prior EF known)   #Systolic ejection murmur  3/ with radiation to carotid, RUSB, likely AS. Patient with frequent falls as well, concern for ?severe AS, patient is also on a BB and diuretic, ?history of HF  - echocardiogram  - obtain collateral from PCP  - lasix 20 mg PO as needed  - c/w metoprolol tartrate 12.5 mg BID with holding parameters, uptitrate to 25 mg BID (home dose) when pt BP tolerates  ED vitals: T 100.5, HR , /74, RR 18, O2 sat 93% 6 LPM NC  ED labs: WBC 13.04, Hb 13.2, Plt 96, neutro 87.2%, INR 2.22, Lactate 3.4-> 2.4, Gluc 556, A1c 11.7, Betahydroxy 1.4, BNP 1878; U/A +blood, bacteria, ketone 40, gluc >1000; COVID-19 PCR neg, RSV neg  ED studies: CT head no acute intracranial hemorrhage. CXR cardiomegaly, thoracic aortic calcification, L basilar opacity/pleural effusion   Bullous pemphigoid. Plan: hx of bullous pemphigoid, on weekly methotrexate 7.5 mg (on ), and a prednisone taper (previously on 5 mg BID, now on 4 mg BID for 4 weeks)  - c/w prednisone 4mg PO BID  - obtain more collateral from PCP  - holding methotrexate 7.5 mg weekly (dosed ).    Patient with reported pre-DM2 as per son, found to have hyperglycemia in 500s range upon presentation s/p 1L NS and 10U regular insulinx2, with improvement in 200s, likely trigger by current septic state.   FSG & insulin:    Dinner FSG   Lispro   +    Ate  Bedtime FSG     Lantus      and Lispro         Breakfast FSG   Lispro    +    Ate  Lunch FSG      Lispro    +    Ate      Age at Dx:  How dx:  Hx and duration of insulin:  Current Therapy:  Hx of other regimens:    Home FSG:  Fasting  Lunch  Dinner  Bed    Diet:  Exercise:    Hx of hypoglycemia:  Hx of DKA/HHS:  Complications:  Outpatient Endo:    FH:  DM:  Thyroid:  Autoimmune:  Other:    SH:  Patient denies any tobacco history, no longer drinks alcohol, and denies illicit drug use. Retired       PMH & Surgical Hx:AMS; SEPSIS    Handoff    MEWS Score    Chronic venous insufficiency    Seizure disorder    Cerebrovascular accident (CVA)    Bullous pemphigoid    Spinal stenosis    Atrial fibrillation    Sepsis, due to unspecified organism, unspecified whether acute organ dysfunction present    Nutrition, metabolism, and development symptoms    Seizure disorder    Toxic metabolic encephalopathy    Diabetes mellitus    Bullous pemphigoid    Atrial fibrillation    Cellulitis of right lower extremity    Gram-negative pneumonia    Acute hypoxemic respiratory failure    Severe sepsis    AMS    Lung infiltrate    Cellulitis of right lower extremity    Hyperglycemia    Altered mental status, unspecified altered mental status type    Fever, unspecified fever cause    [email or web address]            Current Meds:  acetaminophen   Tablet .. 650 milliGRAM(s) Oral every 6 hours PRN  azithromycin   Tablet 500 milliGRAM(s) Oral every 24 hours  cefTRIAXone   IVPB 1000 milliGRAM(s) IV Intermittent every 24 hours  dextrose 40% Gel 15 Gram(s) Oral once PRN  dextrose 5%. 1000 milliLiter(s) IV Continuous <Continuous>  dextrose 50% Injectable 12.5 Gram(s) IV Push once  dextrose 50% Injectable 25 Gram(s) IV Push once  dextrose 50% Injectable 25 Gram(s) IV Push once  finasteride 5 milliGRAM(s) Oral daily  glucagon  Injectable 1 milliGRAM(s) IntraMuscular once PRN  insulin lispro (ADMELOG) corrective regimen sliding scale   SubCutaneous Before meals and at bedtime  levETIRAcetam 750 milliGRAM(s) Oral two times a day  metoprolol tartrate 12.5 milliGRAM(s) Oral two times a day  multivitamin  Chewable 1 Tablet(s) Oral daily  predniSONE   Tablet 4 milliGRAM(s) Oral two times a day  rivaroxaban 10 milliGRAM(s) Oral <User Schedule>  tamsulosin 0.4 milliGRAM(s) Oral at bedtime      Allergies:  No Known Allergies      ROS:  Denies the following except as indicated.    General: weight loss/weight gain, decreased appetite, fatigue  Eyes: Blurry vision, double vision, visual changes  ENT: Throat pain, changes in voice,   CV: palpitations, SOB, CP, cough  GI: NVD, difficulty swallowing, abdominal pain  : polyuria, dysuria  Endo: abnormal menses, temperature intolerance, decreased libido  MSK: weakness, joint pain  Skin: rash, dryness, diaphoresis  Heme: Easy bruising, bleeding  Neuro: HA, dizziness, lightheadedness, numbness/ tingling  Psych: Anxiety, Depression    Vital Signs Last 24 Hrs  T(C): 36.6 (23 Oct 2020 09:47), Max: 38.1 (22 Oct 2020 23:32)  T(F): 97.8 (23 Oct 2020 09:47), Max: 100.5 (22 Oct 2020 23:32)  HR: 92 (23 Oct 2020 09:47) (83 - 108)  BP: 93/60 (23 Oct 2020 09:47) (87/53 - 124/74)  BP(mean): 67 (23 Oct 2020 02:30) (67 - 83)  RR: 20 (23 Oct 2020 09:47) (17 - 22)  SpO2: 96% (23 Oct 2020 09:47) (93% - 100%)  Height (cm): 172.7 (10-23 @ 09:46)  Weight (kg): 90.7 (10-22 @ 23:32)  BMI (kg/m2): 30.4 (10-23 @ 09:46)      Constitutional: wn/wd in NAD.   HEENT: NCAT, MMM, OP clear, EOMI, , no proptosis or lid retraction  Neck: no thyromegaly or palpable thyroid nodules   Respiratory: lungs CTAB.  Cardiovascular: regular rhythm, normal S1 and S2, no audible murmurs, no peripheral edema  GI: soft, NT/ND, no masses/HSM appreciated.  Neurology: no tremors, DTR 2+  Skin: no visible rashes/lesions. no acanthosis nigricans. no hyperlipotrophy. no cushing's stigmata.  Psychiatric: AAO x 3, normal affect/mood.  Ext: radial pulses intact, DP pulses intact, extremities warm, no cyanosis, clubbing or edema.       LABS:                        13.2   13.04 )-----------( 96       ( 23 Oct 2020 00:20 )             41.1     10-23    139  |  98  |  17  ----------------------------<  556<HH>  4.6   |  27  |  0.92    Ca    9.6      23 Oct 2020 00:20  Mg     1.7     10-23    TPro  6.2  /  Alb  3.5  /  TBili  1.0  /  DBili  x   /  AST  27  /  ALT  18  /  AlkPhos  102  10-23    PT/INR - ( 23 Oct 2020 00:20 )   PT: 25.7 sec;   INR: 2.22          PTT - ( 23 Oct 2020 00:20 )  PTT:36.8 sec  Urinalysis Basic - ( 23 Oct 2020 01:58 )    Color: Yellow / Appearance: Clear / S.020 / pH: x  Gluc: x / Ketone: 40 mg/dL  / Bili: Negative / Urobili: 0.2 E.U./dL   Blood: x / Protein: Trace mg/dL / Nitrite: NEGATIVE   Leuk Esterase: NEGATIVE / RBC: 5-10 /HPF / WBC < 5 /HPF   Sq Epi: x / Non Sq Epi: 0-5 /HPF / Bacteria: Present /HPF            RADIOLOGY & ADDITIONAL STUDIES:  CAPILLARY BLOOD GLUCOSE      POCT Blood Glucose.: 156 mg/dL (23 Oct 2020 08:49)  POCT Blood Glucose.: 286 mg/dL (23 Oct 2020 03:56)  POCT Blood Glucose.: 389 mg/dL (23 Oct 2020 02:12)      Will discuss in rounds  A/P:89M with atrial fibrillation (on Xarelto 10 mg daily), spinal stenosis, bullous pemphigoid, b/l LE insufficiency w/ ulceration, DM, history of seizures, CVA, presents with altered mental status and fever. Patient has a L basilar opacity on CXR. Patient also has difficulty swallowing by nursing staff - and wife states he always chokes on water on occasion. Concern for chronic microaspiration, no known sick contacts or recent travel. Leukocytosis may be from steroid use, but will treat empirically for CAP    1.  DM -   A1C:  Weight:  Cr/GRF:  ER:    Please continue lantus       units at night / morning.  Please continue lispro      units before each meal.  Please continue lispro moderate / low dose sliding scale four times daily with meals and at bedtime    Pt's fingerstick glucose goal is     Will continue to monitor     For discharge, pt can continue    Pt can follow up at discharge with [facility] by calling  to make an appointment.   Will discuss case with     and update primary team       HPI:   89YOM with PMH of Afib, Spinal Stenosis, Bullous Pemphigoid (on steroids), CVA (dementia baseline no specific neurological deficits), Chronic LE b/l Ulcers presenting for worsening AMS from baseline and fever. Per family he is oriented AAox3 wit intermittent confused/forgetful at baseline, but had an acute worsening of mental status yesterday; found to be seated on toilet for hours, lethargic, refusing to get up. On ROS found to be warm upon palpation likely febrile, and possibly worsening of chronic b/l LE ulcers, otherwise no other infective symptoms reported. As per patient, his only positive ROS was for cough, but otherwise denied any SOB, abd pain, diarrhea, urinary complaints (wears a diaper), no neck pain.   - At ED vitals: T 100.8, HR 80s, BP /50-70s, R 18 (reported 88%RA --> 93% 6L NC on flowsheets) (on 2L NC  99% when I assessed at bedside)  - Labs s/f: WBC 13 Neut predominance Lactate 2.4--> 2.4, PLT 96, Glu 556 BHB 1.4, VBG  pH 7.4, BNP 1800s, UA ketonuria/glucosuria (no WBCs) COVID-19 PCR neg, RSV neg  - CTH- neg for acute process  - CXR:  b/l possible effusions with LLL consolidation   - Pt given: Vanco 1500x1, Zosyn 3.375, Tylenol 650, 1L NS, 10U regular insulin x2   TTE showed severe AS and moderate MR. He is experiencing dysphagia. Cineesophagogram ordered. Currently NPO.  Currently being treated for PNA and possible cellulitis with Ceftriaxone and azithromycin.    Endocrine was consulted for hyperglycemia. A1C 11.7%. Per wife at bedside, prednisone 7.5mg was initiated 2 years ago for bullous pemphigoid It has been slowly tapered, and is currently 4mg BID. Also on methotrexate 7.5mg weekly. He has had hyperglycemia since initiation but no treatment required and he hasn't been checking FSG at home. His doctor is aware of hyperglycemia and they are "watching it." He receives all care at [facility].  He has no previous hx of DM prior to steroid use. He has a son with type 1 DM, and parents were presumed T2DM. No hx of thyroid disease.  DNR/DNI. Declining cardiac intervention.   Pt seen at bedside. Appears to be having rigors, but oral temp normal. Medicine team at bedside to evaluate.    FSG & insulin:  10/23:  MN . Received regular insulin 10 units SC and 2L NS.  2A   . Regular insulin 10 units SC  4AM .  9AM . Lispro 2 units.  Lunch .    SH:  Patient denies any tobacco history, no longer drinks alcohol, and denies illicit drug use. Retired       PMH & Surgical Hx:AMS; SEPSIS    Handoff    MEWS Score    Chronic venous insufficiency    Seizure disorder    Cerebrovascular accident (CVA)    Bullous pemphigoid    Spinal stenosis    Atrial fibrillation    Sepsis, due to unspecified organism, unspecified whether acute organ dysfunction present    Nutrition, metabolism, and development symptoms    Seizure disorder    Toxic metabolic encephalopathy    Diabetes mellitus    Bullous pemphigoid    Atrial fibrillation    Cellulitis of right lower extremity    Gram-negative pneumonia    Acute hypoxemic respiratory failure    Severe sepsis    AMS    Lung infiltrate    Cellulitis of right lower extremity    Hyperglycemia    Altered mental status, unspecified altered mental status type    Fever, unspecified fever cause    SysAdmin_VisitLink            Current Meds:  acetaminophen   Tablet .. 650 milliGRAM(s) Oral every 6 hours PRN  azithromycin   Tablet 500 milliGRAM(s) Oral every 24 hours  cefTRIAXone   IVPB 1000 milliGRAM(s) IV Intermittent every 24 hours  dextrose 40% Gel 15 Gram(s) Oral once PRN  dextrose 5%. 1000 milliLiter(s) IV Continuous <Continuous>  dextrose 50% Injectable 12.5 Gram(s) IV Push once  dextrose 50% Injectable 25 Gram(s) IV Push once  dextrose 50% Injectable 25 Gram(s) IV Push once  finasteride 5 milliGRAM(s) Oral daily  glucagon  Injectable 1 milliGRAM(s) IntraMuscular once PRN  insulin lispro (ADMELOG) corrective regimen sliding scale   SubCutaneous Before meals and at bedtime  levETIRAcetam 750 milliGRAM(s) Oral two times a day  metoprolol tartrate 12.5 milliGRAM(s) Oral two times a day  multivitamin  Chewable 1 Tablet(s) Oral daily  predniSONE   Tablet 4 milliGRAM(s) Oral two times a day  rivaroxaban 10 milliGRAM(s) Oral <User Schedule>  tamsulosin 0.4 milliGRAM(s) Oral at bedtime      Allergies:  No Known Allergies      ROS: Negative, except as noted in HPI.    Vital Signs Last 24 Hrs  T(C): 36.6 (23 Oct 2020 09:47), Max: 38.1 (22 Oct 2020 23:32)  T(F): 97.8 (23 Oct 2020 09:47), Max: 100.5 (22 Oct 2020 23:32)  HR: 92 (23 Oct 2020 09:47) (83 - 108)  BP: 93/60 (23 Oct 2020 09:47) (87/53 - 124/74)  BP(mean): 67 (23 Oct 2020 02:30) (67 - 83)  RR: 20 (23 Oct 2020 09:47) (17 - 22)  SpO2: 96% (23 Oct 2020 09:47) (93% - 100%)  Height (cm): 172.7 (10-23 @ 09:46)  Weight (kg): 90.7 (10-22 @ 23:32)  BMI (kg/m2): 30.4 (10-23 @ 09:46)      Constitutional: wn/wd in NAD.   HEENT: NCAT, MMM, OP clear, EOMI, , no proptosis or lid retraction  Neck: no thyromegaly or palpable thyroid nodules   Respiratory: lungs CTAB.  Cardiovascular: regular rhythm, normal S1 and S2, RUSB III/VI murmur with radiation to carotids.  GI: soft, NT/ND, no masses/HSM appreciated.  Neurology: no tremors, DTR 2+  Skin: no visible rashes/lesions. no acanthosis nigricans. no hyperlipotrophy. no cushing's stigmata.  Psychiatric: AAO x 3, normal affect/mood.  Ext: radial pulses intact, DP pulses intact, extremities warm, no cyanosis, clubbing. + LES edema, discoloration, thin skin      LABS:                        13.2   13.04 )-----------( 96       ( 23 Oct 2020 00:20 )             41.1     10-23    139  |  98  |  17  ----------------------------<  556<HH>  4.6   |  27  |  0.92    Ca    9.6      23 Oct 2020 00:20  Mg     1.7     10-23    TPro  6.2  /  Alb  3.5  /  TBili  1.0  /  DBili  x   /  AST  27  /  ALT  18  /  AlkPhos  102  10-23    PT/INR - ( 23 Oct 2020 00:20 )   PT: 25.7 sec;   INR: 2.22          PTT - ( 23 Oct 2020 00:20 )  PTT:36.8 sec  Urinalysis Basic - ( 23 Oct 2020 01:58 )    Color: Yellow / Appearance: Clear / S.020 / pH: x  Gluc: x / Ketone: 40 mg/dL  / Bili: Negative / Urobili: 0.2 E.U./dL   Blood: x / Protein: Trace mg/dL / Nitrite: NEGATIVE   Leuk Esterase: NEGATIVE / RBC: 5-10 /HPF / WBC < 5 /HPF   Sq Epi: x / Non Sq Epi: 0-5 /HPF / Bacteria: Present /HPF            RADIOLOGY & ADDITIONAL STUDIES:  CAPILLARY BLOOD GLUCOSE      POCT Blood Glucose.: 156 mg/dL (23 Oct 2020 08:49)  POCT Blood Glucose.: 286 mg/dL (23 Oct 2020 03:56)  POCT Blood Glucose.: 389 mg/dL (23 Oct 2020 02:12)    Will discuss in rounds  A/P:89M with atrial fibrillation (on Xarelto 10 mg daily), spinal stenosis, bullous pemphigoid, b/l LE insufficiency w/ ulceration, DM, history of seizures, CVA, presents with altered mental status and fever being treated for CAP and possible LES cellulitis, also with hyperglycemia.     1.  DM - steroid/infection induced hyperglycemia.   A1C: 11.7%  Weight: 90kg BMI 30  Cr/GRF: 0.92/70  EF: 49%, severe AS, mod MR    Please continue lantus       units at night / morning.  Please continue lispro      units before each meal.  Please continue lispro moderate / low dose sliding scale four times daily with meals and at bedtime    Pt's fingerstick glucose goal is 100-200.    Will continue to monitor     For discharge, pt can continue    Pt can follow up at discharge with private physician.     Will discuss case with Dr. Aguirre  and update primary team       HPI:   89YOM with PMH of Afib, Spinal Stenosis, Bullous Pemphigoid (on steroids), CVA (dementia baseline no specific neurological deficits), Chronic LE b/l Ulcers presenting for worsening AMS from baseline and fever. Per family he is oriented AAox3 wit intermittent confused/forgetful at baseline, but had an acute worsening of mental status yesterday; found to be seated on toilet for hours, lethargic, refusing to get up. On ROS found to be warm upon palpation likely febrile, and possibly worsening of chronic b/l LE ulcers, otherwise no other infective symptoms reported. As per patient, his only positive ROS was for cough, but otherwise denied any SOB, abd pain, diarrhea, urinary complaints (wears a diaper), no neck pain.   - At ED vitals: T 100.8, HR 80s, BP /50-70s, R 18 (reported 88%RA --> 93% 6L NC on flowsheets) (on 2L NC  99% when I assessed at bedside)  - Labs s/f: WBC 13 Neut predominance Lactate 2.4--> 2.4, PLT 96, Glu 556 BHB 1.4, VBG  pH 7.4, BNP 1800s, UA ketonuria/glucosuria (no WBCs) COVID-19 PCR neg, RSV neg  - CTH- neg for acute process  - CXR:  b/l possible effusions with LLL consolidation   - Pt given: Vanco 1500x1, Zosyn 3.375, Tylenol 650, 1L NS, 10U regular insulin x2   TTE showed severe AS and moderate MR. He is experiencing dysphagia. Cineesophagogram ordered. Currently NPO.  Currently being treated for PNA and possible cellulitis with Ceftriaxone and azithromycin.    Endocrine was consulted for hyperglycemia. A1C 11.7%. Per wife at bedside, prednisone 7.5mg was initiated 2 years ago for bullous pemphigoid It has been slowly tapered, and is currently 4mg BID. Also on methotrexate 7.5mg weekly. He has had hyperglycemia since initiation but no treatment required and he hasn't been checking FSG at home. His doctor is aware of hyperglycemia and they are "watching it." He receives all care at [place].  He has no previous hx of DM prior to steroid use. He has a son with type 1 DM, and parents were presumed T2DM. No hx of thyroid disease.  DNR/DNI. Declining cardiac intervention.   Pt seen at bedside. Appears to be having rigors, but oral temp normal. Medicine team at bedside to evaluate.    FSG & insulin:  10/23:  MN . Received regular insulin 10 units SC and 2L NS.  2A   . Regular insulin 10 units SC  4AM .  9AM . Lispro 2 units.  Lunch .    SH:  Patient denies any tobacco history, no longer drinks alcohol, and denies illicit drug use. Retired       PMH & Surgical Hx:AMS; SEPSIS    Handoff    MEWS Score    Chronic venous insufficiency    Seizure disorder    Cerebrovascular accident (CVA)    Bullous pemphigoid    Spinal stenosis    Atrial fibrillation    Sepsis, due to unspecified organism, unspecified whether acute organ dysfunction present    Nutrition, metabolism, and development symptoms    Seizure disorder    Toxic metabolic encephalopathy    Diabetes mellitus    Bullous pemphigoid    Atrial fibrillation    Cellulitis of right lower extremity    Gram-negative pneumonia    Acute hypoxemic respiratory failure    Severe sepsis    AMS    Lung infiltrate    Cellulitis of right lower extremity    Hyperglycemia    Altered mental status, unspecified altered mental status type    Fever, unspecified fever cause    SysAdmin_VisitLink            Current Meds:  acetaminophen   Tablet .. 650 milliGRAM(s) Oral every 6 hours PRN  azithromycin   Tablet 500 milliGRAM(s) Oral every 24 hours  cefTRIAXone   IVPB 1000 milliGRAM(s) IV Intermittent every 24 hours  dextrose 40% Gel 15 Gram(s) Oral once PRN  dextrose 5%. 1000 milliLiter(s) IV Continuous <Continuous>  dextrose 50% Injectable 12.5 Gram(s) IV Push once  dextrose 50% Injectable 25 Gram(s) IV Push once  dextrose 50% Injectable 25 Gram(s) IV Push once  finasteride 5 milliGRAM(s) Oral daily  glucagon  Injectable 1 milliGRAM(s) IntraMuscular once PRN  insulin lispro (ADMELOG) corrective regimen sliding scale   SubCutaneous Before meals and at bedtime  levETIRAcetam 750 milliGRAM(s) Oral two times a day  metoprolol tartrate 12.5 milliGRAM(s) Oral two times a day  multivitamin  Chewable 1 Tablet(s) Oral daily  predniSONE   Tablet 4 milliGRAM(s) Oral two times a day  rivaroxaban 10 milliGRAM(s) Oral <User Schedule>  tamsulosin 0.4 milliGRAM(s) Oral at bedtime      Allergies:  No Known Allergies      ROS: Negative, except as noted in HPI.    Vital Signs Last 24 Hrs  T(C): 36.6 (23 Oct 2020 09:47), Max: 38.1 (22 Oct 2020 23:32)  T(F): 97.8 (23 Oct 2020 09:47), Max: 100.5 (22 Oct 2020 23:32)  HR: 92 (23 Oct 2020 09:47) (83 - 108)  BP: 93/60 (23 Oct 2020 09:47) (87/53 - 124/74)  BP(mean): 67 (23 Oct 2020 02:30) (67 - 83)  RR: 20 (23 Oct 2020 09:47) (17 - 22)  SpO2: 96% (23 Oct 2020 09:47) (93% - 100%)  Height (cm): 172.7 (10-23 @ 09:46)  Weight (kg): 90.7 (10-22 @ 23:32)  BMI (kg/m2): 30.4 (10-23 @ 09:46)      Constitutional: wn/wd in NAD.   HEENT: NCAT, MMM, OP clear, EOMI, , no proptosis or lid retraction  Neck: no thyromegaly or palpable thyroid nodules   Respiratory: lungs CTAB.  Cardiovascular: regular rhythm, normal S1 and S2, RUSB III/VI murmur with radiation to carotids.  GI: soft, NT/ND, no masses/HSM appreciated.  Neurology: no tremors, DTR 2+  Skin: no visible rashes/lesions. no acanthosis nigricans. no hyperlipotrophy. no cushing's stigmata.  Psychiatric: AAO x 3, normal affect/mood.  Ext: radial pulses intact, DP pulses intact, extremities warm, no cyanosis, clubbing. + LES edema, discoloration, thin skin      LABS:                        13.2   13.04 )-----------( 96       ( 23 Oct 2020 00:20 )             41.1     10-23    139  |  98  |  17  ----------------------------<  556<HH>  4.6   |  27  |  0.92    Ca    9.6      23 Oct 2020 00:20  Mg     1.7     10-23    TPro  6.2  /  Alb  3.5  /  TBili  1.0  /  DBili  x   /  AST  27  /  ALT  18  /  AlkPhos  102  10-23    PT/INR - ( 23 Oct 2020 00:20 )   PT: 25.7 sec;   INR: 2.22          PTT - ( 23 Oct 2020 00:20 )  PTT:36.8 sec  Urinalysis Basic - ( 23 Oct 2020 01:58 )    Color: Yellow / Appearance: Clear / S.020 / pH: x  Gluc: x / Ketone: 40 mg/dL  / Bili: Negative / Urobili: 0.2 E.U./dL   Blood: x / Protein: Trace mg/dL / Nitrite: NEGATIVE   Leuk Esterase: NEGATIVE / RBC: 5-10 /HPF / WBC < 5 /HPF   Sq Epi: x / Non Sq Epi: 0-5 /HPF / Bacteria: Present /HPF            RADIOLOGY & ADDITIONAL STUDIES:  CAPILLARY BLOOD GLUCOSE      POCT Blood Glucose.: 156 mg/dL (23 Oct 2020 08:49)  POCT Blood Glucose.: 286 mg/dL (23 Oct 2020 03:56)  POCT Blood Glucose.: 389 mg/dL (23 Oct 2020 02:12)      A/P:89M with atrial fibrillation (on Xarelto 10 mg daily), spinal stenosis, bullous pemphigoid, b/l LE insufficiency w/ ulceration, DM, history of seizures, CVA, presents with altered mental status and fever being treated for CAP and possible LES cellulitis, also with hyperglycemia.     1.  DM - steroid/infection induced hyperglycemia.   A1C: 11.7%  Weight: 90kg BMI 30  Cr/GRF: 0.92/70  EF: 49%, severe AS, mod MR     Please continue lispro moderate dose sliding scale four times daily with meals and at bedtime    Pt's fingerstick glucose goal is 100-200.    Will continue to monitor     For discharge, pt can continue    Pt can follow up at discharge with private physician.     Will discuss case with Dr. Aguirre  and update primary team

## 2020-10-23 NOTE — H&P ADULT - PROBLEM SELECTOR PLAN 5
patient w/ afib, on low dose Xarelto, 10 mg daily  - c/w Xarelto 10 mg PO q24h patient w/ afib, on low dose Xarelto, 10 mg daily  - c/w Xarelto 10 mg PO q24h  - rate control w/ metoprolol, currently 12.5 mg BID w/ hold parameters

## 2020-10-23 NOTE — H&P ADULT - NSHPLABSRESULTS_GEN_ALL_CORE
LABS:                         13.2   13.04 )-----------( 96       ( 23 Oct 2020 00:20 )             41.1     10-23    139  |  98  |  17  ----------------------------<  556<HH>  4.6   |  27  |  0.92    Ca    9.6      23 Oct 2020 00:20  Mg     1.7     10-23    TPro  6.2  /  Alb  3.5  /  TBili  1.0  /  DBili  x   /  AST  27  /  ALT  18  /  AlkPhos  102  10-23    PT/INR - ( 23 Oct 2020 00:20 )   PT: 25.7 sec;   INR: 2.22          PTT - ( 23 Oct 2020 00:20 )  PTT:36.8 sec  Urinalysis Basic - ( 23 Oct 2020 01:58 )    Color: Yellow / Appearance: Clear / S.020 / pH: x  Gluc: x / Ketone: 40 mg/dL  / Bili: Negative / Urobili: 0.2 E.U./dL   Blood: x / Protein: Trace mg/dL / Nitrite: NEGATIVE   Leuk Esterase: NEGATIVE / RBC: 5-10 /HPF / WBC < 5 /HPF   Sq Epi: x / Non Sq Epi: 0-5 /HPF / Bacteria: Present /HPF      CARDIAC MARKERS ( 23 Oct 2020 00:20 )  x     / <0.01 ng/mL / x     / x     / x          Serum Pro-Brain Natriuretic Peptide: 1878 pg/mL (10-23 @ 00:20)    Lactate, Blood: 2.4 mmol/L (10-23 @ 02:25)  Lactate, Blood: 3.4 mmol/L (10-23 @ 00:20)    RADIOLOGY, EKG & ADDITIONAL TESTS:  < from: CT Head No Cont (10.23.20 @ 00:29) >    FINDINGS:  Evaluation severely limited by motion artifact. Within that limitation:    VENTRICLES AND SULCI: Mild parenchymal volume loss. No hydrocephalus.  INTRA-AXIAL: No mass effect, acute hemorrhage, or midline shift.  There is probable patchy hypoattenuation of the periventricular white matter, likely due to chronic microangiopathic disease. Unable to assess for definite loss of gray-white differentiation for characterization of acute infarction.  EXTRA-AXIAL: Probable arachnoid cyst in the right middle cranial fossa.  VISUALIZED SINUSES: Predominantly clear  VISUALIZED MASTOIDS: Well-aerated.  CALVARIUM: No displaced calvarial fracture.    IMPRESSION:  Severely motion limited examination:  1.  No acute intracranial hemorrhage is seen.  2.  Unable to assess for definite loss of gray-white differentiation for characterization of acute infarction.

## 2020-10-23 NOTE — H&P ADULT - NSHPPHYSICALEXAM_GEN_ALL_CORE
VITAL SIGNS:  T(C): 36.8 (10-23-20 @ 06:35), Max: 38.1 (10-22-20 @ 23:32)  T(F): 98.2 (10-23-20 @ 06:35), Max: 100.5 (10-22-20 @ 23:32)  HR: 96 (10-23-20 @ 06:35) (83 - 108)  BP: 101/66 (10-23-20 @ 06:35) (87/53 - 124/74)  BP(mean): 67 (10-23-20 @ 02:30) (67 - 83)  RR: 22 (10-23-20 @ 06:35) (17 - 22)  SpO2: 97% (10-23-20 @ 06:35) (93% - 100%)  Wt(kg): --    PHYSICAL EXAM:    Constitutional: Elderly male, lying comfortably in bed, NAD  Head: Nc/At  Eyes: PERRL, EOMI, clear conjunctiva  ENT: no nasal discharge; uvula midline, MMM  Neck: supple; no JVD  Respiratory: apically CTA b/l, no wheezes/crackles, decreased inspiratory effort and breath sounds at bases  Cardiac: +S1/S2, +RRR, 3/6 systolic cresc-decres murmur, radiation to carotids, best at RUSB  Gastrointestinal: soft, non-tender, non-distended, normoactive bowel sounds x4  Extremities: WWP, no clubbing or cyanosis, 2+ pitting edema from proximal knee to ankle, w/ associated erythema, increased warmth, superficial ulcer at medial aspect RLE  Vascular: 2+ radial and DP pulses b/l  Dermatologic: skin warm, scattered UE ecchymoses noted, R>L  Neurologic: AAOx1 to name, the year 2020, and did not know he was in hospital; follows commands VITAL SIGNS:  T(C): 36.8 (10-23-20 @ 06:35), Max: 38.1 (10-22-20 @ 23:32)  T(F): 98.2 (10-23-20 @ 06:35), Max: 100.5 (10-22-20 @ 23:32)  HR: 96 (10-23-20 @ 06:35) (83 - 108)  BP: 101/66 (10-23-20 @ 06:35) (87/53 - 124/74)  BP(mean): 67 (10-23-20 @ 02:30) (67 - 83)  RR: 22 (10-23-20 @ 06:35) (17 - 22)  SpO2: 97% (10-23-20 @ 06:35) (93% - 100%)  Wt(kg): --    PHYSICAL EXAM:    Constitutional: Elderly male, lying comfortably in bed, NAD  Head: Nc/At  Eyes: PERRL, EOMI, clear conjunctiva  ENT: no nasal discharge; uvula midline, MMM  Neck: supple; no JVD  Respiratory: apically CTA b/l, no wheezes/crackles, decreased inspiratory effort and breath sounds at bases  Cardiac: +S1/S2, +RRR, 4/6 systolic cresc-decres murmur, radiation to carotids, best at RUSB  Gastrointestinal: soft, non-tender, non-distended, normoactive bowel sounds x4  Extremities: WWP, no clubbing or cyanosis, 2+ pitting edema from proximal knee to ankle, w/ associated erythema, increased warmth, superficial ulcer at medial aspect RLE  Vascular: 2+ radial and DP pulses b/l  Dermatologic: skin warm, scattered UE ecchymoses noted, R>L  Neurologic: AAOx1 to name, the year 2020, and did not know he was in hospital; follows commands

## 2020-10-23 NOTE — SWALLOW VFSS/MBS ASSESSMENT ADULT - DIAGNOSTIC IMPRESSIONS
Pt p/w mod-severe oropharyngeal dysphagia. Oral stage was significant for decreased sensation/awareness, decreased efficiency of oral manipulation of bolus, resulting in premature loss and severe lingual residue (most notably noted w solids, which worsened as this study progressed). Pharyngeal stage was significant for mistimed airway protection resulting in inconsistent aspiration with thin liquids and nectar thick liquids (NTL) by cup. Inconsistent cough response noted to the aspiration. A cued cough could not clear the aspirate. Reduced BOT retraction and pharyngeal contractions resulted in post-deglutitive residue, primarily w solids, which became severe as this study progressed. Pt made no attempt to independently clear and was not consistently stimuable for secondary swallows. A cued liquid wash helped to reduce the residue. Of concern, there was a filling defect in the cervical esophagus, which was never fully cleared despite liquid washes. There is concern for continued build-up in this area with risk of aspiration of this residue. Given these observations, it is recommended that the pt being a nectar-thick liquid, full liquid diet.

## 2020-10-23 NOTE — CONSULT NOTE ADULT - ASSESSMENT
per Internal Medicine    89 yo M with atrial fibrillation (on Xarelto 10 mg daily), spinal stenosis, bullous pemphigoid, b/l LE insufficiency w/ ulceration, DM, history of seizures, CVA, presents with altered mental status and fever. Patient has a L basilar opacity on CXR. Patient also has difficulty swallowing by nursing staff - and wife states he always chokes on water on occasion. Concern for chronic microaspiration, no known sick contacts or recent travel. Leukocytosis may be from steroid use, but will treat empirically for CAP    Problem/Plan - 1:  ·  Problem: Severe sepsis.  Plan: tachycardic, leukocytotic w/ lactate elevated to 3.4 -> 2.4 on admission after 2L bolus.  - f/u blood cultures  - f/u repeat lactate  - monitor fever curve  - tylenol 650 mg PO q6hPRN for fever.     Problem/Plan - 2:  ·  Problem: Acute hypoxemic respiratory failure.  Plan: patient sat 93% on arrival on 6L NC, likely from bilateral bronchopulmonary congestion w/ L basilar opacity, likely from chronic microaspiration resulting in PNA  - plan as below.     Problem/Plan - 3:  ·  Problem: Gram-negative pneumonia.  Plan: s/p vancomycin 1.5g in ED  - c/w with ceftriaxone 1g IV q24h  - azithromycin 500 mg PO daily for 3 days  - supplemental O2 as needed.     Problem/Plan - 4:  ·  Problem: Cellulitis of right lower extremity.  Plan: patient with increased surrounding warmth, erythema, and increased edema RLE>LLE, likely source from ulcerations over time from bullous pemphigoid and venous stasis  - abx as per above    #Systolic ejection murmur  3/6 with radiation to carotid, RUSB, likely AS. Patient with frequent falls as well, concern for ?severe AS, patient is also on a BB and diuretic, ?history of HF  - echocardiogram  - obtain collateral from PCP  - lasix 20 mg PO as needed  - c/w metoprolol tartrate 25 mg BID with holding parameters    #frequent falls  - physical therapy.    Problem/Plan - 5:  ·  Problem: Atrial fibrillation.     Problem/Plan - 6:  Problem: Bullous pemphigoid. Plan: hx of bullous pemphigoid, on weekly methotrexate 7.5 mg (on Fridays), and a prednisone taper (previously on 5 mg BID, now on 4 mg BID for 4 weeks)  - c/w prednisone 4mg PO BID  - obtain more collateral from PCP.    Problem/Plan - 7:  ·  Problem: Diabetes mellitus.  Plan: history of prediabetes, does have a family history of diabetes. A1c is 11.7 this admission, and patient presented in volume down state in settting of hyperglycemia, ketonuria, elevated hydoxybutyrate, without acidosis; s/p 20 units of regular insulin in the ED  - C/w moderate insulin sliding scale  - endocrine consultation, appreciate recs  - once a patient is ordered for diet, will be consistent carb w/ evening snack.     Problem/Plan - 8:  ·  Problem: Toxic metabolic encephalopathy.  Plan: patient noted to be acting differently prior to admission, sat on the toilet for >3 hours. patient possibly fell asleep, and patient was stuck to the toilet possibly  - likely 2/2 underlying infection  - CT head negative  - continue to correlate clinically.     Problem/Plan - 9:  ·  Problem: Seizure disorder.  Plan: prior history of seizure disorder, last seizure was 1 year, patient would have difficulty articulate, but cannot speak. Dr. Rizzo ([facility])  - c/w levetiracetam 750 mg PO BID.     Problem/Plan - 10:  Problem: Nutrition, metabolism, and development symptoms. Plan; F: no IVF  E: K>4 Mg>2, monitor and replete as needed  N: NPO pending S&S evaluation  Ppx: rivoraoxaban 10 mg daily for afib  D: RMF  FULL CODE.

## 2020-10-23 NOTE — H&P ADULT - ATTENDING COMMENTS
Patient was seen and examined with the resident team today.  I agree with Dr. White's assessment and plan with the following exceptions/additions:     Briefly, 89yo gentleman with a PMH of Afib (on Xarelto), spinal stenosis, bullous pemphigoid (on steroids, MTX), chronic venous insufficiency and seizures (AOx3 at baseline) who p/w AMS (AOx1) after being found slumped on toilet x several hours by his wife, subsequently found to have sepsis w/multiple potential sources (UTI v CAP/HAP/aspiration v cellulitis) c/b hyperglycemia (new DM2, A1c 11.7%), hypoxemia, elevated lactate, thrombocytopenia and coagulopathy.  VS - afebrile now, HR 90's, SBP 's on 2-3L NC.  Exam - AOx1-2, follows commands, faint crackles, irregular and slightly tachy, +BS/soft/NTND, WWP ++edema, chronic skin changes 2/2 known skin disorder and potential LE superimposed erythema.  Labs - repeat lactate 2.7, .  CXR w/LLL opacity.    -- c/w CTX and Azithro pending collateral  -- f/u blood and urine culture; please obtain Legionella antigen   -- Speech eval given high risk for aspiration   -- daily CBC and INR to monitor for resolution of thrombocytopenia POA (unclear if chronic) and elevated INR  -- repeat lactate in the PM  -- TTE to assess for valvulopathy and ability to fluid resuscitate  -- glycemic control (suspect steroid-induced DM2); Endo consult pending   -- can hold MTX but would continue low-dose Pred to avoid precipitating hypocortisolemia i/s/o sepsis and chronic steroid use    -- to assist with forward perfusion, needs rate control as BP tolerates; would start short-acting BB at 1/2 home dose   -- c/w home A/C   -- DVT PPx - on therapeutic A/C  -- Dispo - TBD, comes from home     Guadalupe Wu  [phone]

## 2020-10-23 NOTE — CONSULT NOTE ADULT - SUBJECTIVE AND OBJECTIVE BOX
HPI:  89M with atrial fibrillation (on Xarelto 10 mg daily), spinal stenosis, bullous pemphigoid, b/l LE insufficiency w/ ulceration, DM, history of seizures presents with altered mental status and fever. History obtained from overnight ED provider documentation and wife, given patient is a poor historian. Wife states that patient did not act like himself overnight, was noted to be sitting on the toilet for approximately 3 hours. Patient did not fall (however, per patient, he states he does fall on occasion). Wife denied any head trauma, nausea/vomiting, diarrhea. She assists him with his medications daily. Patient himself states he "feels well," and is not aware of the reason why he is in the hospital, nor does he realize he was in the hospital. Patient denies any fever, chills, nausea, vomiting, changes in bowel habits, changes in urinary habits - he does not recollect sitting on the toilet for that long of a period yesterday.    ED vitals: T 100.5, HR , /74, RR 18, O2 sat 93% 6 LPM NC  ED labs: WBC 13.04, Hb 13.2, Plt 96, neutro 87.2%, INR 2.22, Lactate 3.4-> 2.4, Gluc 556, A1c 11.7, Betahydroxy 1.4, BNP 1878; U/A +blood, bacteria, ketone 40, gluc >1000; COVID-19 PCR neg, RSV neg  ED studies: CT head no acute intracranial hemorrhage. CXR cardiomegaly, thoracic aortic calcification, L basilar opacity/pleural effusion (23 Oct 2020 07:05)      ROS: A 10-point review of systems was otherwise negative.    PAST MEDICAL & SURGICAL HISTORY:  Chronic venous insufficiency    Seizure disorder    Cerebrovascular accident (CVA)    Bullous pemphigoid    Spinal stenosis    Atrial fibrillation        SOCIAL HISTORY:  FAMILY HISTORY:      ALLERGIES: 	  No Known Allergies            MEDICATIONS:  acetaminophen   Tablet .. 650 milliGRAM(s) Oral every 6 hours PRN  azithromycin   Tablet 500 milliGRAM(s) Oral every 24 hours  cefTRIAXone   IVPB 1000 milliGRAM(s) IV Intermittent every 24 hours  dextrose 40% Gel 15 Gram(s) Oral once PRN  dextrose 5%. 1000 milliLiter(s) IV Continuous <Continuous>  dextrose 50% Injectable 12.5 Gram(s) IV Push once  dextrose 50% Injectable 25 Gram(s) IV Push once  dextrose 50% Injectable 25 Gram(s) IV Push once  finasteride 5 milliGRAM(s) Oral daily  glucagon  Injectable 1 milliGRAM(s) IntraMuscular once PRN  insulin lispro (ADMELOG) corrective regimen sliding scale   SubCutaneous Before meals and at bedtime  levETIRAcetam 750 milliGRAM(s) Oral two times a day  metoprolol tartrate 12.5 milliGRAM(s) Oral two times a day  multivitamin  Chewable 1 Tablet(s) Oral daily  predniSONE   Tablet 4 milliGRAM(s) Oral two times a day  rivaroxaban 10 milliGRAM(s) Oral <User Schedule>  tamsulosin 0.4 milliGRAM(s) Oral at bedtime      PHYSICAL EXAM:  T(C): 36.6 (10-23-20 @ 09:47), Max: 38.1 (10-22-20 @ 23:32)  HR: 92 (10-23-20 @ 09:47) (83 - 108)  BP: 93/60 (10-23-20 @ 09:47) (87/53 - 124/74)  RR: 20 (10-23-20 @ 09:47) (17 - 22)  SpO2: 96% (10-23-20 @ 09:47) (93% - 100%)  Wt(kg): --      GEN: Awake, comfortable. NAD.   HEENT: NCAT, PERRL, EOMI. Mucosa moist. No JVD.   RESP: CTA b/l  CV: RRR, normal s1/s2. No m/r/g.  ABD: Soft, NTND. BS+  EXT: Warm. No edema, clubbing, or cyanosis.   NEURO: AAOx3. No focal deficits.    I&O's Summary    Height (cm): 172.7 (10-23 @ 09:46)  Weight (kg): 90.7 (10-22 @ 23:32)  BMI (kg/m2): 30.4 (10-23 @ 09:46)  BSA (m2): 2.04 (10-23 @ 09:46)  	  LABS:	 	    CARDIAC MARKERS:                                  13.2   13.04 )-----------( 96       ( 23 Oct 2020 00:20 )             41.1     10-23    139  |  98  |  17  ----------------------------<  556<HH>  4.6   |  27  |  0.92    Ca    9.6      23 Oct 2020 00:20  Mg     1.7     10-23    TPro  6.2  /  Alb  3.5  /  TBili  1.0  /  DBili  x   /  AST  27  /  ALT  18  /  AlkPhos  102  10-23    proBNP: Serum Pro-Brain Natriuretic Peptide: 1878 pg/mL (10-23 @ 00:20)    Lipid Profile:   HgA1c:   TSH:     TELEMETRY: 	    ECG:  	  RADIOLOGY:   ECHO:  STRESS:  CATH:   HPI:  89M with atrial fibrillation (on Xarelto 10 mg daily), spinal stenosis, bullous pemphigoid, b/l LE insufficiency w/ ulceration, DM, history of seizures presents with altered mental status and fever. History obtained from overnight ED provider documentation and wife, given patient is a poor historian. Wife states that patient did not act like himself overnight, was noted to be sitting on the toilet for approximately 3 hours. Patient did not fall (however, per patient, he states he does fall on occasion). Wife denied any head trauma, nausea/vomiting, diarrhea. She assists him with his medications daily. Patient himself states he "feels well," and is not aware of the reason why he is in the hospital, nor does he realize he was in the hospital. Patient denies any fever, chills, nausea, vomiting, changes in bowel habits, changes in urinary habits - he does not recollect sitting on the toilet for that long of a period yesterday.    ED vitals: T 100.5, HR , /74, RR 18, O2 sat 93% 6 LPM NC  ED labs: WBC 13.04, Hb 13.2, Plt 96, neutro 87.2%, INR 2.22, Lactate 3.4-> 2.4, Gluc 556, A1c 11.7, Betahydroxy 1.4, BNP 1878; U/A +blood, bacteria, ketone 40, gluc >1000; COVID-19 PCR neg, RSV neg  ED studies: CT head no acute intracranial hemorrhage. CXR cardiomegaly, thoracic aortic calcification, L basilar opacity/pleural effusion (23 Oct 2020 07:05)    Heart failure consulted on 10/23 for recommendations on management. The patient was seen and examined at bedside. Denies any chest pain, palpitations, SOB. Wife at bedside states he follows closely with his outpatient cardiologist at [place] (Dr. Duarte [phone]) every 4-6 months and was never told about any problems with the heart outside of his atrial fibrillation.    ROS: A 10-point review of systems was otherwise negative.    PAST MEDICAL & SURGICAL HISTORY:  Chronic venous insufficiency    Seizure disorder    Cerebrovascular accident (CVA)    Bullous pemphigoid    Spinal stenosis    Atrial fibrillation        SOCIAL HISTORY: No smoking hx, no current alcohol use, denies drug use  FAMILY HISTORY:       ALLERGIES: 	  No Known Allergies      HOME MEDS:  Lasix 20mg PO QD  Lopressor 25 bid  Xarelto 20  Prednisone 4 bid    MEDICATIONS:  acetaminophen   Tablet .. 650 milliGRAM(s) Oral every 6 hours PRN  azithromycin   Tablet 500 milliGRAM(s) Oral every 24 hours  cefTRIAXone   IVPB 1000 milliGRAM(s) IV Intermittent every 24 hours  dextrose 40% Gel 15 Gram(s) Oral once PRN  dextrose 5%. 1000 milliLiter(s) IV Continuous <Continuous>  dextrose 50% Injectable 12.5 Gram(s) IV Push once  dextrose 50% Injectable 25 Gram(s) IV Push once  dextrose 50% Injectable 25 Gram(s) IV Push once  finasteride 5 milliGRAM(s) Oral daily  glucagon  Injectable 1 milliGRAM(s) IntraMuscular once PRN  insulin lispro (ADMELOG) corrective regimen sliding scale   SubCutaneous Before meals and at bedtime  levETIRAcetam 750 milliGRAM(s) Oral two times a day  metoprolol tartrate 12.5 milliGRAM(s) Oral two times a day  multivitamin  Chewable 1 Tablet(s) Oral daily  predniSONE   Tablet 4 milliGRAM(s) Oral two times a day  rivaroxaban 10 milliGRAM(s) Oral <User Schedule>  tamsulosin 0.4 milliGRAM(s) Oral at bedtime      PHYSICAL EXAM:  T(C): 36.6 (10-23-20 @ 09:47), Max: 38.1 (10-22-20 @ 23:32)  HR: 92 (10-23-20 @ 09:47) (83 - 108)  BP: 93/60 (10-23-20 @ 09:47) (87/53 - 124/74)  RR: 20 (10-23-20 @ 09:47) (17 - 22)  SpO2: 96% (10-23-20 @ 09:47) (93% - 100%)  Wt(kg): --      GEN: Awake, comfortable. NAD.   HEENT: NCAT, PERRL, EOMI. Mucosa moist. No JVD.   RESP: CTA b/l  CV: irregularly irregular, normal s1/s2. III/VI DONALDO radiation to carotids.  ABD: Soft, NTND. BS+  EXT: LLE erythematous, warm to touch. RLE cooler w/chronic venous insufficnecy skin changes distally. (+) 3+ pitting edema b/l LE. No clubbing, or cyanosis.   NEURO: AAOx1. No focal deficits.    I&O's Summary    Height (cm): 172.7 (10-23 @ 09:46)  Weight (kg): 90.7 (10-22 @ 23:32)  BMI (kg/m2): 30.4 (10-23 @ 09:46)  BSA (m2): 2.04 (10-23 @ 09:46)  	  LABS:	 	    CARDIAC MARKERS:                                  13.2   13.04 )-----------( 96       ( 23 Oct 2020 00:20 )             41.1     10-23    139  |  98  |  17  ----------------------------<  556<HH>  4.6   |  27  |  0.92    Ca    9.6      23 Oct 2020 00:20  Mg     1.7     10-23    TPro  6.2  /  Alb  3.5  /  TBili  1.0  /  DBili  x   /  AST  27  /  ALT  18  /  AlkPhos  102  10-23    proBNP: Serum Pro-Brain Natriuretic Peptide: 1878 pg/mL (10-23 @ 00:20)    Lipid Profile:   HgA1c:   TSH:     TELEMETRY: 	    ECG:  Aflutter	  RADIOLOGY:   ECHO:   < from: TTE Echo Complete w/o Contrast w/ Doppler (10.23.20 @ 10:56) >  CONCLUSIONS:     1. Normal left ventricular cavity size.   2. Mild symmetric left ventricular hypertrophy.   3. Mildly reduced left ventricular systolic function with global hypokinesis, LV EF49%.   4. Normal right ventricular size.   5. Reduced right ventricular systolic function.   6. Moderately dilated left atrium.   7. Mildly dilated right atrium.   8. Mildly dilated aortic root, 3.8 cm.   9. Mildly dilated proximal ascending aorta, 3.7 cm.  10. Severe aortic stenosis, low-flow, low-gradient, GRACE 1.0cm2, PV 2.7m/s, MG 17mmHg, LVOT/AV 0.19, SVI 25ml/m2.  11. Mild aortic regurgitation.  12. At least moderate mitral regurgitation, likely posterior leaflet prolapse.  13. Mild-to-moderate mitral stenosis, MG 5.7mmHg at 76bpm.  14. Mild tricuspid regurgitation.  15. No evidence of pulmonary hypertension, pulmonary artery systolic pressure is 34 mmHg.  16. No pericardial effusion.  17. Consider FANY to better visualize the mitral valve and elucidate the mechanism of mitral regurgitation, if clinically indicated.    < end of copied text >    STRESS:  CATH:

## 2020-10-23 NOTE — ED PROVIDER NOTE - CARE PLAN
Principal Discharge DX:	Sepsis, due to unspecified organism, unspecified whether acute organ dysfunction present  Secondary Diagnosis:	Fever, unspecified fever cause  Secondary Diagnosis:	Altered mental status, unspecified altered mental status type   Principal Discharge DX:	Sepsis, due to unspecified organism, unspecified whether acute organ dysfunction present  Secondary Diagnosis:	Fever, unspecified fever cause  Secondary Diagnosis:	Altered mental status, unspecified altered mental status type  Secondary Diagnosis:	Hyperglycemia  Secondary Diagnosis:	Cellulitis of right lower extremity  Secondary Diagnosis:	Lung infiltrate

## 2020-10-23 NOTE — ED PROVIDER NOTE - PROGRESS NOTE DETAILS
concern for congestion on cxr - will hold on giving weight based fluids at this time. ICU consulted concern for congestion on cxr - will hold on giving weight based fluids at this time. ICU consulted  elevated glucose - however no evidence of DKA seen by ICU - recommend another 1L NS and will reevaluate. per ICU pt can be admitted to regional.

## 2020-10-23 NOTE — CONSULT NOTE ADULT - ATTENDING COMMENTS
The patient was seen and evaluated in consultation at the bedside with Endocrinology NP Kimberly. The patient was admitted with a cough and a change in mental status.Left lobe consolidation was noted His temperature was 100.8.Glucose level was 556 with elevated lactate and low CO2 and elevated betahydroxy butyrate. His Hgb A1C  was 11,7%.He had been on a Prednisone taper plus Methotrexate. After 2 liters of Normal Saline glucoses have been [phone].I agree with te plan to continue sliding scale Insulin until his Insulin requirements are ascertained.

## 2020-10-23 NOTE — SWALLOW VFSS/MBS ASSESSMENT ADULT - SLP GENERAL OBSERVATIONS
Pt was awake, alert, sitting upright for this study. Nasal cannula in place. He had difficulty following directions.

## 2020-10-23 NOTE — SWALLOW VFSS/MBS ASSESSMENT ADULT - RECOMMENDED FEEDING/EATING TECHNIQUES
Cleam oral cavity after meals prior to allowing pt to return to sleep/check mouth frequently for oral residue/pocketing/alternate food with liquid/maintain upright posture during/after eating for 30 mins/small sips/bites

## 2020-10-23 NOTE — ED ADULT NURSE NOTE - OBJECTIVE STATEMENT
pt BIBA by EMT(R. wrist 18G on arrival+NS appx. 100ml given) to ED for altered mental status, pulse ox 88% in field, febrile, b/l LE swelling, redness, pt doesn't answer however response to pain. L arm ace wrap applied on arrival.

## 2020-10-23 NOTE — H&P ADULT - NSICDXPASTMEDICALHX_GEN_ALL_CORE_FT
PAST MEDICAL HISTORY:  Atrial fibrillation     Bullous pemphigoid     Spinal stenosis      PAST MEDICAL HISTORY:  Atrial fibrillation     Bullous pemphigoid     Cerebrovascular accident (CVA)     Chronic venous insufficiency     Seizure disorder     Spinal stenosis

## 2020-10-23 NOTE — H&P ADULT - PROBLEM SELECTOR PLAN 2
patient sat 93% on arrival on 6L NC, likely from bilateral bronchopulmonary congestion w/ L basilar opacity, likely from chronic microaspiration resulting in PNA  - plan as below

## 2020-10-23 NOTE — CONSULT NOTE ADULT - SUBJECTIVE AND OBJECTIVE BOX
ICU SERVICE CONSULTATION NOTE    HPI:  89YOM with PMH of Afib, Spinal Stenosis, Bullous Pemphigoid (on steroids) presenting for worsening AMS from baseline and fever.    - At ED vitals: T 100.8, HR 80s, /70s, R 18 (reported 88%RA --> 93% 6L NC)   - Labs s/f: WBC 13 Neut predominance Lactate 2.4--> 2.4, PLT 96, Glu 556 BHB 1.4, VBG  pH 7.4, BNP 1800s, UA ketonuria/glucosuria (no WBCs)   - CTH- neg for acute process  - CXR: Pending read, b/l possible effusions with LLL consolidation   - Pt given: Vanco 1500x1, Zosyn 3.375, Tylenol 650, 1L NS, 10U regular insulin     - ED consulted ICU for Sepsis with drop in Blood pressure from sBP 120s to 100s (verbalized, not on flowsheet).     ROS:  Otherwise negative, except as specified in HPI.    VITAL SIGNS:  ICU Vital Signs Last 24 Hrs  T(C): 37.1 (23 Oct 2020 02:19), Max: 38.1 (22 Oct 2020 23:32)  T(F): 98.8 (23 Oct 2020 02:19), Max: 100.5 (22 Oct 2020 23:32)  HR: 92 (23 Oct 2020 02:30) (83 - 108)  BP: 87/53 (23 Oct 2020 02:30) (87/53 - 124/74)  BP(mean): 67 (23 Oct 2020 02:30) (67 - 83)  RR: 17 (23 Oct 2020 02:30) (17 - 19)  SpO2: 97% (23 Oct 2020 02:30) (93% - 100%)      POCT Blood Glucose.: 389 mg/dL (23 Oct 2020 02:12)      PHYSICAL EXAM:  Constitutional: resting comfortably in bed, NAD  HEENT: NC/AT; PERRL, anicteric sclera; no oropharyngeal erythema or exudates; MMM  Neck: supple, no appreciable JVD  Respiratory: CTA B/L, no W/R/R; respirations appear non-labored, conversive in full sentences  Cardiovascular: +S1/S2, RRR  Gastrointestinal: abdomen soft, NT/ND  Extremities: WWP; no clubbing, cyanosis or edema  Vascular: 2+ radial, femoral, and DP/PT pulses B/L  Dermatologic: skin normal color and turgor; no visible rashes  Neurological:     LABS:                        13.2   13.04 )-----------( 96       ( 23 Oct 2020 00:20 )             41.1     10-23    139  |  98  |  17  ----------------------------<  556<HH>  4.6   |  27  |  0.92    Ca    9.6      23 Oct 2020 00:20  Mg     1.7     10-23    TPro  6.2  /  Alb  3.5  /  TBili  1.0  /  DBili  x   /  AST  27  /  ALT  18  /  AlkPhos  102  10-23    PT/INR - ( 23 Oct 2020 00:20 )   PT: 25.7 sec;   INR: 2.22          PTT - ( 23 Oct 2020 00:20 )  PTT:36.8 sec  Lactate, Blood: 2.4 mmol/L (10-23-20 @ 02:25)  Lactate, Blood: 3.4 mmol/L (10-23-20 @ 00:20)    CARDIAC MARKERS ( 23 Oct 2020 00:20 )  x     / <0.01 ng/mL / x     / x     / x          Urinalysis Basic - ( 23 Oct 2020 01:58 )    Color: Yellow / Appearance: Clear / S.020 / pH: x  Gluc: x / Ketone: 40 mg/dL  / Bili: Negative / Urobili: 0.2 E.U./dL   Blood: x / Protein: Trace mg/dL / Nitrite: NEGATIVE   Leuk Esterase: NEGATIVE / RBC: 5-10 /HPF / WBC < 5 /HPF   Sq Epi: x / Non Sq Epi: 0-5 /HPF / Bacteria: Present /HPF      Blood Gas Profile w/Lytes - Venous: Performed in Lab (10-23-20 @ 00:17)      EKG: Reviewed.    RADIOLOGY & ADDITIONAL TESTS: Reviewed. ICU SERVICE CONSULTATION NOTE    HPI:  89YOM with PMH of Afib, Spinal Stenosis, Bullous Pemphigoid (on steroids), CVA (dementia baseline no specific neurological deficits), Chronic LE b/l Ulcers presenting for worsening AMS from baseline and fever.   - Spoke to Son (HCP) on phone as patient unable to provide significant history except for intermittently answering questions on ROS. Son mentions patient at baseline oriented AAox3, intermittently confused/forgetful, but today noted to have an acute worsening of mental status, found to be seated on toilet for hours, lethargic, refusing to get up. On ROS found to be warm upon palpation likely febrile, and possibly worsening of chronic b/l LE ulcers, otherwise no other infective symptoms reported. As per patient, his only positive ROS was for cough, but otherwise denied any SOB, abd pain, diarrhea, urinary complaints (wears a diaper), no neck pain.   - At ED vitals: T 100.8, HR 80s, BP /50-70s, R 18 (reported 88%RA --> 93% 6L NC on flowsheets) (on 2L NC  99% when I assessed at bedside)  - Labs s/f: WBC 13 Neut predominance Lactate 2.4--> 2.4, PLT 96, Glu 556 BHB 1.4, VBG  pH 7.4, BNP 1800s, UA ketonuria/glucosuria (no WBCs)   - CTH- neg for acute process  - CXR: Pending read, b/l possible effusions with LLL consolidation   - Pt given: Vanco 1500x1, Zosyn 3.375, Tylenol 650, 1L NS, 10U regular insulin     - ED consulted ICU for Sepsis with drop in Blood pressure from sBP 120s to 100s (verbalized, not on flowsheet).     ROS:  Otherwise negative, except as specified in HPI.    VITAL SIGNS:  ICU Vital Signs Last 24 Hrs  T(C): 37.1 (23 Oct 2020 02:19), Max: 38.1 (22 Oct 2020 23:32)  T(F): 98.8 (23 Oct 2020 02:19), Max: 100.5 (22 Oct 2020 23:32)  HR: 92 (23 Oct 2020 02:30) (83 - 108)  BP: 87/53 (23 Oct 2020 02:30) (87/53 - 124/74)  BP(mean): 67 (23 Oct 2020 02:30) (67 - 83)  RR: 17 (23 Oct 2020 02:30) (17 - 19)  SpO2: 97% (23 Oct 2020 02:30) (93% - 100%)      POCT Blood Glucose.: 389 mg/dL (23 Oct 2020 02:12)      PHYSICAL EXAM:  Constitutional: resting comfortably in bed, NAD  HEENT: NC/AT; PERRL, anicteric sclera; no oropharyngeal erythema or exudates; MMM  Neck: supple, no appreciable JVD  Respiratory: CTA B/L, no W/R/R; respirations appear non-labored, conversive in full sentences  Cardiovascular: +S1/S2, RRR  Gastrointestinal: abdomen soft, NT/ND  Extremities: WWP; no clubbing, cyanosis or edema  Vascular: 2+ radial, femoral, and DP/PT pulses B/L  Dermatologic: skin normal color and turgor; no visible rashes  Neurological:     LABS:                        13.2   13.04 )-----------( 96       ( 23 Oct 2020 00:20 )             41.1     10-23    139  |  98  |  17  ----------------------------<  556<HH>  4.6   |  27  |  0.92    Ca    9.6      23 Oct 2020 00:20  Mg     1.7     10-23    TPro  6.2  /  Alb  3.5  /  TBili  1.0  /  DBili  x   /  AST  27  /  ALT  18  /  AlkPhos  102  10-23    PT/INR - ( 23 Oct 2020 00:20 )   PT: 25.7 sec;   INR: 2.22          PTT - ( 23 Oct 2020 00:20 )  PTT:36.8 sec  Lactate, Blood: 2.4 mmol/L (10-23-20 @ 02:25)  Lactate, Blood: 3.4 mmol/L (10-23-20 @ 00:20)    CARDIAC MARKERS ( 23 Oct 2020 00:20 )  x     / <0.01 ng/mL / x     / x     / x          Urinalysis Basic - ( 23 Oct 2020 01:58 )    Color: Yellow / Appearance: Clear / S.020 / pH: x  Gluc: x / Ketone: 40 mg/dL  / Bili: Negative / Urobili: 0.2 E.U./dL   Blood: x / Protein: Trace mg/dL / Nitrite: NEGATIVE   Leuk Esterase: NEGATIVE / RBC: 5-10 /HPF / WBC < 5 /HPF   Sq Epi: x / Non Sq Epi: 0-5 /HPF / Bacteria: Present /HPF      Blood Gas Profile w/Lytes - Venous: Performed in Lab (10-23-20 @ 00:17)      EKG: Reviewed.    RADIOLOGY & ADDITIONAL TESTS: Reviewed. ICU SERVICE CONSULTATION NOTE    HPI:  89YOM with PMH of Afib, Spinal Stenosis, Bullous Pemphigoid (on steroids), CVA (dementia baseline no specific neurological deficits), Chronic LE b/l Ulcers presenting for worsening AMS from baseline and fever.   - Spoke to Son (HCP) on phone as patient unable to provide significant history except for intermittently answering questions on ROS. Son mentions patient at baseline oriented AAox3, intermittently confused/forgetful, but today noted to have an acute worsening of mental status, found to be seated on toilet for hours, lethargic, refusing to get up. On ROS found to be warm upon palpation likely febrile, and possibly worsening of chronic b/l LE ulcers, otherwise no other infective symptoms reported. As per patient, his only positive ROS was for cough, but otherwise denied any SOB, abd pain, diarrhea, urinary complaints (wears a diaper), no neck pain.   - At ED vitals: T 100.8, HR 80s, BP /50-70s, R 18 (reported 88%RA --> 93% 6L NC on flowsheets) (on 2L NC  99% when I assessed at bedside)  - Labs s/f: WBC 13 Neut predominance Lactate 2.4--> 2.4, PLT 96, Glu 556 BHB 1.4, VBG  pH 7.4, BNP 1800s, UA ketonuria/glucosuria (no WBCs)   - CTH- neg for acute process  - CXR: Pending read, b/l possible effusions with LLL consolidation   - Pt given: Vanco 1500x1, Zosyn 3.375, Tylenol 650, 1L NS, 10U regular insulin     - ED consulted ICU for Severe Sepsis with drop in Blood pressure (although only given 1L at this time as per ED concerned as no prior EF known)     ROS:  Otherwise negative, except as specified in HPI.    VITAL SIGNS:  ICU Vital Signs Last 24 Hrs  T(C): 37.1 (23 Oct 2020 02:19), Max: 38.1 (22 Oct 2020 23:32)  T(F): 98.8 (23 Oct 2020 02:19), Max: 100.5 (22 Oct 2020 23:32)  HR: 92 (23 Oct 2020 02:30) (83 - 108)  BP: 87/53 (23 Oct 2020 02:30) (87/53 - 124/74)  BP(mean): 67 (23 Oct 2020 02:30) (67 - 83)  RR: 17 (23 Oct 2020 02:30) (17 - 19)  SpO2: 97% (23 Oct 2020 02:30) (93% - 100%)      POCT Blood Glucose.: 389 mg/dL (23 Oct 2020 02:12)      PHYSICAL EXAM:  Constitutional: resting comfortably in bed, NAD  HEENT: NC/AT; PERRL, anicteric sclera; no oropharyngeal erythema or exudates; MMM  Neck: supple, no appreciable JVD  Respiratory: CTA B/L, no W/R/R; respirations appear non-labored, conversive in full sentences  Cardiovascular: +S1/S2, RRR  Gastrointestinal: abdomen soft, NT/ND  Extremities: WWP; no clubbing, cyanosis or edema  Vascular: 2+ radial, femoral, and DP/PT pulses B/L  Dermatologic: skin normal color and turgor; no visible rashes  Neurological:     LABS:                        13.2   13.04 )-----------( 96       ( 23 Oct 2020 00:20 )             41.1     10-23    139  |  98  |  17  ----------------------------<  556<HH>  4.6   |  27  |  0.92    Ca    9.6      23 Oct 2020 00:20  Mg     1.7     10-23    TPro  6.2  /  Alb  3.5  /  TBili  1.0  /  DBili  x   /  AST  27  /  ALT  18  /  AlkPhos  102  10-23    PT/INR - ( 23 Oct 2020 00:20 )   PT: 25.7 sec;   INR: 2.22          PTT - ( 23 Oct 2020 00:20 )  PTT:36.8 sec  Lactate, Blood: 2.4 mmol/L (10-23-20 @ 02:25)  Lactate, Blood: 3.4 mmol/L (10-23-20 @ 00:20)    CARDIAC MARKERS ( 23 Oct 2020 00:20 )  x     / <0.01 ng/mL / x     / x     / x          Urinalysis Basic - ( 23 Oct 2020 01:58 )    Color: Yellow / Appearance: Clear / S.020 / pH: x  Gluc: x / Ketone: 40 mg/dL  / Bili: Negative / Urobili: 0.2 E.U./dL   Blood: x / Protein: Trace mg/dL / Nitrite: NEGATIVE   Leuk Esterase: NEGATIVE / RBC: 5-10 /HPF / WBC < 5 /HPF   Sq Epi: x / Non Sq Epi: 0-5 /HPF / Bacteria: Present /HPF      Blood Gas Profile w/Lytes - Venous: Performed in Lab (10-23-20 @ 00:17)      EKG: Reviewed.    RADIOLOGY & ADDITIONAL TESTS: Reviewed. ICU SERVICE CONSULTATION NOTE    HPI:  89YOM with PMH of Afib, Spinal Stenosis, Bullous Pemphigoid (on steroids), CVA (dementia baseline no specific neurological deficits), Chronic LE b/l Ulcers presenting for worsening AMS from baseline and fever.   - Spoke to Son (HCP) on phone as patient unable to provide significant history except for intermittently answering questions on ROS. Son mentions patient at baseline oriented AAox3, intermittently confused/forgetful, but today noted to have an acute worsening of mental status, found to be seated on toilet for hours, lethargic, refusing to get up. On ROS found to be warm upon palpation likely febrile, and possibly worsening of chronic b/l LE ulcers, otherwise no other infective symptoms reported. As per patient, his only positive ROS was for cough, but otherwise denied any SOB, abd pain, diarrhea, urinary complaints (wears a diaper), no neck pain.   - At ED vitals: T 100.8, HR 80s, BP /50-70s, R 18 (reported 88%RA --> 93% 6L NC on flowsheets) (on 2L NC  99% when I assessed at bedside)  - Labs s/f: WBC 13 Neut predominance Lactate 2.4--> 2.4, PLT 96, Glu 556 BHB 1.4, VBG  pH 7.4, BNP 1800s, UA ketonuria/glucosuria (no WBCs)   - CTH- neg for acute process  - CXR: Pending read, b/l possible effusions with LLL consolidation   - Pt given: Vanco 1500x1, Zosyn 3.375, Tylenol 650, 1L NS, 10U regular insulin x2     - ED consulted ICU for Severe Sepsis with drop in Blood pressure (although only given 1L at this time as per ED concerned as no prior EF known)     ROS:  Otherwise negative, except as specified in HPI.    VITAL SIGNS:  ICU Vital Signs Last 24 Hrs  T(C): 37.1 (23 Oct 2020 02:19), Max: 38.1 (22 Oct 2020 23:32)  T(F): 98.8 (23 Oct 2020 02:19), Max: 100.5 (22 Oct 2020 23:32)  HR: 92 (23 Oct 2020 02:30) (83 - 108)  BP: 87/53 (23 Oct 2020 02:30) (87/53 - 124/74)  BP(mean): 67 (23 Oct 2020 02:30) (67 - 83)  RR: 17 (23 Oct 2020 02:30) (17 - 19)  SpO2: 97% (23 Oct 2020 02:30) (93% - 100%)    POCT Blood Glucose.: 389 mg/dL (23 Oct 2020 02:12)    PHYSICAL EXAM:  Constitutional: Obese  male sleeping comfortably, lethargic, but arousable in NAD  HEENT: NC/AT; PERRL, Mucus memb dry   Neck: supple, no appreciable JVD  Respiratory: ronchi at bases L>R, breathing comfortably on NC   Cardiovascular: +S1/S2, irregularly irregular, systolic murmur at 5th ICS   Gastrointestinal: abdomen soft, NT/ND  Extremities: b/l LE erythema, pitting edema, tender to palpation   Vascular: 2+ radial pulses B/L  Neurological: AAox 2 to self and year     LABS:                        13.2   13.04 )-----------( 96       ( 23 Oct 2020 00:20 )             41.1     10-23    139  |  98  |  17  ----------------------------<  556<HH>  4.6   |  27  |  0.92    Ca    9.6      23 Oct 2020 00:20  Mg     1.7     10-23    TPro  6.2  /  Alb  3.5  /  TBili  1.0  /  DBili  x   /  AST  27  /  ALT  18  /  AlkPhos  102  10-23    PT/INR - ( 23 Oct 2020 00:20 )   PT: 25.7 sec;   INR: 2.22          PTT - ( 23 Oct 2020 00:20 )  PTT:36.8 sec  Lactate, Blood: 2.4 mmol/L (10-23-20 @ 02:25)  Lactate, Blood: 3.4 mmol/L (10-23-20 @ 00:20)    CARDIAC MARKERS ( 23 Oct 2020 00:20 )  x     / <0.01 ng/mL / x     / x     / x          Urinalysis Basic - ( 23 Oct 2020 01:58 )    Color: Yellow / Appearance: Clear / S.020 / pH: x  Gluc: x / Ketone: 40 mg/dL  / Bili: Negative / Urobili: 0.2 E.U./dL   Blood: x / Protein: Trace mg/dL / Nitrite: NEGATIVE   Leuk Esterase: NEGATIVE / RBC: 5-10 /HPF / WBC < 5 /HPF   Sq Epi: x / Non Sq Epi: 0-5 /HPF / Bacteria: Present /HPF      Blood Gas Profile w/Lytes - Venous: Performed in Lab (10-23-20 @ 00:17)      EKG: Reviewed.    RADIOLOGY & ADDITIONAL TESTS: Reviewed.

## 2020-10-23 NOTE — H&P ADULT - PROBLEM SELECTOR PLAN 8
patient noted to be acting differently prior to admission, sat on the toilet for >3 hours. patient possibly fell asleep, and patient was stuck to the toilet possibly  - likely 2/2 underlying infection  - CT head negative  - continue to correlate clinically

## 2020-10-23 NOTE — ED PROVIDER NOTE - CRITICAL CARE PROVIDED
direct patient care (not related to procedure)/consultation with other physicians/additional history taking/interpretation of diagnostic studies/telephone consultation with the patient's family/documentation

## 2020-10-23 NOTE — SWALLOW VFSS/MBS ASSESSMENT ADULT - PHARYNGEAL PHASE COMMENTS
Hyoid elevation began prior to bolus reaching ramus of mandible. Airway remained widely patent as bolus spilled down towards pyriforms (worst w thin liquids). Inconsistent aspiration with thin liq (both spoon and cup sips) as well as NTL cup sips. Inconsistent cough response to the aspiration. Chin tuck was attempted x1, which eliminated aspiration w spoon sip of thin liq, but pt stated that it was painful to remain in this position, and so additional trials were not attempted. Also to note- given overall inconsistency of swallow, this single trial is not sufficient to determine whether a chin-tuck would be effective over the course of a meal. Decreased BOT retraction and pharyngeal squeeze, resulting in post-deglutitive residue, which increased in severity as PO trials progressed, likely 2/2 fatigue. Pt denied sensation to this residue and could not be consistently stimualted to initiate a secondary swallow. A liquid wash helped to decrease the residue in the pharynx.

## 2020-10-23 NOTE — CONSULT NOTE ADULT - ASSESSMENT
A/P: 89M with atrial fibrillation (on Xarelto 10 mg daily), spinal stenosis, bullous pemphigoid, b/l LE insufficiency w/ ulceration, DM, history of seizures presents with altered mental status and fever. Adm to [facility] w/concerns for severe sepsis 2/2 gram (+) cocci bacteremia. Heart failure consulted for management recommendations. A/P: 89M with atrial fibrillation (on Xarelto 10 mg daily), spinal stenosis, bullous pemphigoid, b/l LE insufficiency w/ ulceration, DM, history of seizures presents with altered mental status and fever. Adm to [facility] w/concerns for severe sepsis 2/2 gram (+) cocci bacteremia. Heart failure consulted for management recommendations.  - would hold off on further fluid boluses at this time  - diurese w/lasix 40mg IV bid for today; can decrease to 20mg IV bid afterwards  - strict I/O  - can decrease diuresis as needed if the patient becomes hypotensive    d/w attending

## 2020-10-23 NOTE — H&P ADULT - PROBLEM SELECTOR PLAN 7
history of prediabetes, does have a family history of diabetes. A1c is 11.7 this admission, and patient presented in volume down state in settting of hyperglycemia, ketonuria, elevated hydoxybutyrate, without acidosis; s/p 20 units of regular insulin in the ED  - C/w moderate insulin sliding scale  - endocrine consultation, appreciate recs  - once a patient is ordered for diet, will be consistent carb w/ evening snack

## 2020-10-23 NOTE — H&P ADULT - PROBLEM SELECTOR PLAN 1
tachycardic, leukocytotic w/ lactate elevated to 3.4 -> 2.4 on admission after 2L bolus.  - f/u blood cultures  - f/u repeat lactate  - monitor fever curve  - tylenol 650 mg PO q6hPRN for fever

## 2020-10-23 NOTE — ED PROVIDER NOTE - CLINICAL SUMMARY MEDICAL DECISION MAKING FREE TEXT BOX
AMS, found to be febrile, hypoxic on RA, improves with nasal cannula.  IV fluids started by EMS. pt unable to provide full hx at this time  -check labs, ekg  -ivf, vanc/zosyn  -cultures  -CT head, cxr  -covid/flu swab

## 2020-10-23 NOTE — PHYSICAL THERAPY INITIAL EVALUATION ADULT - PERTINENT HX OF CURRENT PROBLEM, REHAB EVAL
Pt. is a 89 y. o. male presenting with fevers, AMS; found to have L basilar opacity and is currently treated for CAP.

## 2020-10-23 NOTE — SWALLOW BEDSIDE ASSESSMENT ADULT - SLP GENERAL OBSERVATIONS
Pt was received awake/alert in bed w RN and wife at bedside. Tremor noted at rest (primarily w b/l upper extremities), which wife stated she noteiced starting yesterday. Per wife, pt had an Modified Barium Swallow Study (MBS/VFSS) at [facility] ~3 months ago, which revealed a functional swallow and pt was recommended an unrestricted diet. At home, while on regular/thin liq diet, pt has been coughing w liquids. Pt was oriented x3. He answered complex yes/no questions w 70% accuracy. Pt was received awake/alert in bed w RN and wife at bedside. Tremor noted at rest (primarily w b/l upper extremities), which wife stated she noteiced starting yesterday. Per wife, pt had an Modified Barium Swallow Study (MBS/VFSS) at [place] ~3 months ago, which revealed a functional swallow and pt was recommended an unrestricted diet. At home, while on regular/thin liq diet, pt has been coughing w liquids. Pt answered complex yes/no questions w 70% accuracy.

## 2020-10-23 NOTE — SWALLOW BEDSIDE ASSESSMENT ADULT - PHARYNGEAL PHASE
Laryngeal elevation palpated, which was judged otbe reduced. Multiple swallows inconsistently noted. Wet voice inconsitsetntly after thin liq

## 2020-10-23 NOTE — H&P ADULT - PROBLEM SELECTOR PLAN 4
patient with increased surrounding warmth, erythema, and increased edema RLE>LLE, likely source from ulcerations over time from bullous pemphigoid and venous stasis  - abx as per above    #Systolic ejection murmur  3/6 with radiation to carotid, RUSB, likely AS. Patient with frequent falls as well, concern for ?severe AS  - echocardiogram  - obtain collateral from PCP    #frequent falls  - physical therapy patient with increased surrounding warmth, erythema, and increased edema RLE>LLE, likely source from ulcerations over time from bullous pemphigoid and venous stasis  - abx as per above    #Systolic ejection murmur  3/6 with radiation to carotid, RUSB, likely AS. Patient with frequent falls as well, concern for ?severe AS, patient is also on a BB and diuretic, ?history of HF  - echocardiogram  - obtain collateral from PCP  - lasix 20 mg PO as needed  - c/w metoprolol tartrate 25 mg BID with holding parameters    #frequent falls  - physical therapy patient with increased surrounding warmth, erythema, and increased edema RLE>LLE, likely source from ulcerations over time from bullous pemphigoid and venous stasis  - abx as per above    #Systolic ejection murmur  3/6 with radiation to carotid, RUSB, likely AS. Patient with frequent falls as well, concern for ?severe AS, patient is also on a BB and diuretic, ?history of HF  - echocardiogram  - obtain collateral from PCP  - lasix 20 mg PO as needed  - c/w metoprolol tartrate 12.5 mg BID with holding parameters, uptitrate to 25 mg BID (home dose) when pt BP tolerates    #frequent falls  - physical therapy

## 2020-10-23 NOTE — PROVIDER CONTACT NOTE (CHANGE IN STATUS NOTIFICATION) - SITUATION
After pt was given PO meds and a sip of water pt began to have difficulty breathing, expiratory wheezes and cough. RN attempted to obtain pulse ox with no success. Pt has poor plueral effusion, MD White notified. After pt was given PO meds and a sip of water pt began to have difficulty breathing with expiratory wheezes and cough and began having tremors, was on R/A prior. RN had difficulty obtaining 02 sat. Pt has poor peripheral effusion, MD White notified and at bed side assessing pt. oxygen obtained via forehead between 95%-100% with fluctuation.

## 2020-10-23 NOTE — PROVIDER CONTACT NOTE (CHANGE IN STATUS NOTIFICATION) - ASSESSMENT
Pt vitals 110/60, 85HR, 98.5F rectal, 20RR, SpO2 100. A&O x3, Pt vitals B/P 110/60, 85HR, 98.5F rectal, 20Resp., SpO2 100%.

## 2020-10-23 NOTE — H&P ADULT - PROBLEM SELECTOR PLAN 3
s/p vancomycin 1.5g in ED  - c/w with ceftriaxone 1g IV q24h  - azithromycin 500 mg PO daily for 3 days  - supplemental O2 as needed

## 2020-10-23 NOTE — CONSULT NOTE ADULT - SUBJECTIVE AND OBJECTIVE BOX
Patient is a 89y old  Male who presents with a chief complaint of altered mental status (23 Oct 2020 07:05)       HPI:  89M with atrial fibrillation (on Xarelto 10 mg daily), spinal stenosis, bullous pemphigoid, b/l LE insufficiency w/ ulceration, DM, history of seizures, CVA, presents with altered mental status and fever. History obtained from overnight ED provider documentation and wife, given patient is a poor historian. Wife states that patient did not act like himself overnight, was noted to be sitting on the toilet for approximately 3 hours. Patient did not fall (however, per patient, he states he does fall on occasion). Wife denied any head trauma, nausea/vomiting, diarrhea. She assists him with his medications daily. Patient himself states he "feels well," and is not aware of the reason why he is in the hospital, nor does he realize he was in the hospital. Patient denies any fever, chills, nausea, vomiting, changes in bowel habits, changes in urinary habits - he does not recollect sitting on the toilet for that long of a period yesterday.    ED vitals: T 100.5, HR , /74, RR 18, O2 sat 93% 6 LPM NC  ED labs: WBC 13.04, Hb 13.2, Plt 96, neutro 87.2%, INR 2.22, Lactate 3.4-> 2.4, Gluc 556, A1c 11.7, Betahydroxy 1.4, BNP 1878; U/A +blood, bacteria, ketone 40, gluc >1000; COVID-19 PCR neg, RSV neg  ED studies: CT head no acute intracranial hemorrhage. CXR cardiomegaly, thoracic aortic calcification, L basilar opacity/pleural effusion (23 Oct 2020 07:05)      PAST MEDICAL & SURGICAL HISTORY:  Chronic venous insufficiency    Seizure disorder    Cerebrovascular accident (CVA)    Bullous pemphigoid    Spinal stenosis    Atrial fibrillation        MEDICATIONS  (STANDING):  azithromycin   Tablet 500 milliGRAM(s) Oral every 24 hours  cefTRIAXone   IVPB 1000 milliGRAM(s) IV Intermittent every 24 hours  dextrose 5%. 1000 milliLiter(s) (50 mL/Hr) IV Continuous <Continuous>  dextrose 50% Injectable 12.5 Gram(s) IV Push once  dextrose 50% Injectable 25 Gram(s) IV Push once  dextrose 50% Injectable 25 Gram(s) IV Push once  finasteride 5 milliGRAM(s) Oral daily  insulin lispro (ADMELOG) corrective regimen sliding scale   SubCutaneous Before meals and at bedtime  levETIRAcetam 750 milliGRAM(s) Oral two times a day  metoprolol tartrate 25 milliGRAM(s) Oral two times a day  multivitamin  Chewable 1 Tablet(s) Oral daily  predniSONE   Tablet 4 milliGRAM(s) Oral two times a day  rivaroxaban 10 milliGRAM(s) Oral <User Schedule>  tamsulosin 0.4 milliGRAM(s) Oral at bedtime    MEDICATIONS  (PRN):  acetaminophen   Tablet .. 650 milliGRAM(s) Oral every 6 hours PRN Temp greater or equal to 38C (100.4F), Moderate Pain (4 - 6)  dextrose 40% Gel 15 Gram(s) Oral once PRN Blood Glucose LESS THAN 70 milliGRAM(s)/deciliter  glucagon  Injectable 1 milliGRAM(s) IntraMuscular once PRN Glucose LESS THAN 70 milligrams/deciliter      FAMILY HISTORY:      CBC Full  -  ( 23 Oct 2020 00:20 )  WBC Count : 13.04 K/uL  RBC Count : 3.89 M/uL  Hemoglobin : 13.2 g/dL  Hematocrit : 41.1 %  Platelet Count - Automated : 96 K/uL  Mean Cell Volume : 105.7 fl  Mean Cell Hemoglobin : 33.9 pg  Mean Cell Hemoglobin Concentration : 32.1 gm/dL  Auto Neutrophil # : 11.39 K/uL  Auto Lymphocyte # : 0.23 K/uL  Auto Monocyte # : 0.32 K/uL  Auto Eosinophil # : 0.00 K/uL  Auto Basophil # : 0.02 K/uL  Auto Neutrophil % : 87.2 %  Auto Lymphocyte % : 1.8 %  Auto Monocyte % : 2.5 %  Auto Eosinophil % : 0.0 %  Auto Basophil % : 0.2 %      10-23    139  |  98  |  17  ----------------------------<  556<HH>  4.6   |  27  |  0.92    Ca    9.6      23 Oct 2020 00:20  Mg     1.7     10-23    TPro  6.2  /  Alb  3.5  /  TBili  1.0  /  DBili  x   /  AST  27  /  ALT  18  /  AlkPhos  102  10-23      Urinalysis Basic - ( 23 Oct 2020 01:58 )    Color: Yellow / Appearance: Clear / S.020 / pH: x  Gluc: x / Ketone: 40 mg/dL  / Bili: Negative / Urobili: 0.2 E.U./dL   Blood: x / Protein: Trace mg/dL / Nitrite: NEGATIVE   Leuk Esterase: NEGATIVE / RBC: 5-10 /HPF / WBC < 5 /HPF   Sq Epi: x / Non Sq Epi: 0-5 /HPF / Bacteria: Present /HPF          Radiology:    < from: Xray Chest 1 View-PORTABLE IMMEDIATE (10.23.20 @ 00:41) >  EXAM:  XR CHEST PORTABLE IMMED 1V                          PROCEDURE DATE:  10/23/2020          INTERPRETATION:  Clinical history/reason for exam: Sepsis.    Single lordotic view low/lung volumes.    Findings/  impression: Cardiomegaly/cardiac magnification. Thoracic aortic calcification.. Left basilar opacity/pleural effusion. Thoracic spine and bilateral shoulder degenerative changes. Thoracic spine neurostimulator, dextro scoliosis.        < from: CT Head No Cont (10.23.20 @ 00:29) >  EXAM:  CT BRAIN                          PROCEDURE DATE:  10/23/2020          INTERPRETATION:  INDICATIONS: Mental status change, unknown cause.    TECHNIQUE: Serial axial images were obtained from the skull base to the vertex without the use of intravenous contrast. Sagittal and coronal reformats were also reviewed.    COMPARISON EXAMINATION: None.    FINDINGS:  Evaluation severely limited by motion artifact. Within that limitation:    VENTRICLES AND SULCI: Mild parenchymal volume loss. No hydrocephalus.  INTRA-AXIAL: No mass effect, acute hemorrhage, or midline shift.  There is probable patchy hypoattenuation of the periventricular white matter, likely due to chronic microangiopathic disease. Unable to assess for definite loss of gray-white differentiation for characterization of acute infarction.  EXTRA-AXIAL: Probable arachnoid cyst in the right middle cranial fossa.  VISUALIZED SINUSES: Predominantly clear  VISUALIZED MASTOIDS: Well-aerated.  CALVARIUM: No displaced calvarial fracture.    IMPRESSION:  Severely motion limited examination:  1.  No acute intracranial hemorrhage is seen.  2.  Unable to assess for definite loss of gray-white differentiation for characterization of acute infarction.              Vital Signs Last 24 Hrs  T(C): 36.8 (23 Oct 2020 06:35), Max: 38.1 (22 Oct 2020 23:32)  T(F): 98.2 (23 Oct 2020 06:35), Max: 100.5 (22 Oct 2020 23:32)  HR: 96 (23 Oct 2020 06:35) (83 - 108)  BP: 101/66 (23 Oct 2020 06:35) (87/53 - 124/74)  BP(mean): 67 (23 Oct 2020 02:30) (67 - 83)  RR: 22 (23 Oct 2020 06:35) (17 - 22)  SpO2: 97% (23 Oct 2020 06:35) (93% - 100%)    REVIEW OF SYSTEMS: per HPI      Physical Exam: elderly 89 yo  gentleman lying in bed, NAD    Head: normocephalic, atraumatic    Eyes: PERRLA, EOMI, no nystagmus, sclera anicteric    ENT: nasal discharge, uvula midline, no oropharyngeal erythema/exudate    Neck: supple, negative JVD, negative carotid bruits, no thyromegaly    Chest: CTA bilaterally, neg wheeze/ rhonchi/ rales/ crackles/ egophany    Cardiovascular: regular rate and rhythm,III/VI murmur    Abdomen: soft, non distended, non tender to palpation in all 4 quadrants, negative rebound/guarding, normal bowel sounds    Extremities: 2+ LE edema    Musculoskeletal:      :     Neurologic Exam:    Alert and oriented x 1 to person,  speech fluent w/o dysarthria, follows commands    Motor Exam:    Upper Extremities:     Right:   suboptimal effort > 3/5    Left:    suboptimal effort > 3/5    Lower Extremities:    Right:   suboptimal effort > 3/5    Left :    suboptimal effort > 3/5               Sensation: Intact to light touch bilaterally x 4 extremities,                                         DTR:            = biceps/     triceps/     brachioradialis                      = patella/   medial hamstring/ankle                      neg clonus                      neg Babinski                          Gait:  not tested        PM&R Impression:    1) deconditioned  2) no focal weakness    Plan:    1) Physical therapy focusing on therapeutic exercises, bed mobility/transfer out of bed evaluation, progressive ambulation with assistive devices prn.    2) Anticipated Disposition Plan/Recs:    pending functional progress

## 2020-10-24 LAB
ANION GAP SERPL CALC-SCNC: 10 MMOL/L — SIGNIFICANT CHANGE UP (ref 5–17)
BUN SERPL-MCNC: 21 MG/DL — SIGNIFICANT CHANGE UP (ref 7–23)
CALCIUM SERPL-MCNC: 9.3 MG/DL — SIGNIFICANT CHANGE UP (ref 8.4–10.5)
CHLORIDE SERPL-SCNC: 107 MMOL/L — SIGNIFICANT CHANGE UP (ref 96–108)
CO2 SERPL-SCNC: 26 MMOL/L — SIGNIFICANT CHANGE UP (ref 22–31)
CREAT SERPL-MCNC: 0.67 MG/DL — SIGNIFICANT CHANGE UP (ref 0.5–1.3)
CULTURE RESULTS: SIGNIFICANT CHANGE UP
GLUCOSE BLDC GLUCOMTR-MCNC: 246 MG/DL — HIGH (ref 70–99)
GLUCOSE BLDC GLUCOMTR-MCNC: 278 MG/DL — HIGH (ref 70–99)
GLUCOSE BLDC GLUCOMTR-MCNC: 289 MG/DL — HIGH (ref 70–99)
GLUCOSE BLDC GLUCOMTR-MCNC: 345 MG/DL — HIGH (ref 70–99)
GLUCOSE SERPL-MCNC: 255 MG/DL — HIGH (ref 70–99)
HCT VFR BLD CALC: 40.2 % — SIGNIFICANT CHANGE UP (ref 39–50)
HGB BLD-MCNC: 12.6 G/DL — LOW (ref 13–17)
MAGNESIUM SERPL-MCNC: 2 MG/DL — SIGNIFICANT CHANGE UP (ref 1.6–2.6)
MCHC RBC-ENTMCNC: 31.3 GM/DL — LOW (ref 32–36)
MCHC RBC-ENTMCNC: 34.3 PG — HIGH (ref 27–34)
MCV RBC AUTO: 109.5 FL — HIGH (ref 80–100)
NRBC # BLD: 0 /100 WBCS — SIGNIFICANT CHANGE UP (ref 0–0)
PLATELET # BLD AUTO: 72 K/UL — LOW (ref 150–400)
POTASSIUM SERPL-MCNC: 4.1 MMOL/L — SIGNIFICANT CHANGE UP (ref 3.5–5.3)
POTASSIUM SERPL-SCNC: 4.1 MMOL/L — SIGNIFICANT CHANGE UP (ref 3.5–5.3)
RBC # BLD: 3.67 M/UL — LOW (ref 4.2–5.8)
RBC # FLD: 14.9 % — HIGH (ref 10.3–14.5)
SODIUM SERPL-SCNC: 143 MMOL/L — SIGNIFICANT CHANGE UP (ref 135–145)
SPECIMEN SOURCE: SIGNIFICANT CHANGE UP
WBC # BLD: 9.35 K/UL — SIGNIFICANT CHANGE UP (ref 3.8–10.5)
WBC # FLD AUTO: 9.35 K/UL — SIGNIFICANT CHANGE UP (ref 3.8–10.5)

## 2020-10-24 PROCEDURE — 99231 SBSQ HOSP IP/OBS SF/LOW 25: CPT

## 2020-10-24 PROCEDURE — 99233 SBSQ HOSP IP/OBS HIGH 50: CPT | Mod: GC

## 2020-10-24 RX ORDER — INSULIN GLARGINE 100 [IU]/ML
10 INJECTION, SOLUTION SUBCUTANEOUS AT BEDTIME
Refills: 0 | Status: DISCONTINUED | OUTPATIENT
Start: 2020-10-24 | End: 2020-10-24

## 2020-10-24 RX ORDER — INSULIN LISPRO 100/ML
8 VIAL (ML) SUBCUTANEOUS
Refills: 0 | Status: DISCONTINUED | OUTPATIENT
Start: 2020-10-24 | End: 2020-10-25

## 2020-10-24 RX ORDER — FUROSEMIDE 40 MG
60 TABLET ORAL EVERY 8 HOURS
Refills: 0 | Status: DISCONTINUED | OUTPATIENT
Start: 2020-10-24 | End: 2020-10-25

## 2020-10-24 RX ORDER — FUROSEMIDE 40 MG
20 TABLET ORAL EVERY 12 HOURS
Refills: 0 | Status: DISCONTINUED | OUTPATIENT
Start: 2020-10-24 | End: 2020-10-24

## 2020-10-24 RX ORDER — INSULIN GLARGINE 100 [IU]/ML
10 INJECTION, SOLUTION SUBCUTANEOUS AT BEDTIME
Refills: 0 | Status: DISCONTINUED | OUTPATIENT
Start: 2020-10-24 | End: 2020-10-25

## 2020-10-24 RX ADMIN — CEFTRIAXONE 100 MILLIGRAM(S): 500 INJECTION, POWDER, FOR SOLUTION INTRAMUSCULAR; INTRAVENOUS at 12:09

## 2020-10-24 RX ADMIN — Medication 1 MILLIGRAM(S): at 12:10

## 2020-10-24 RX ADMIN — Medication 1 TABLET(S): at 12:10

## 2020-10-24 RX ADMIN — Medication 4 MILLIGRAM(S): at 05:54

## 2020-10-24 RX ADMIN — Medication 4: at 09:26

## 2020-10-24 RX ADMIN — Medication 8: at 22:31

## 2020-10-24 RX ADMIN — Medication 6: at 17:45

## 2020-10-24 RX ADMIN — Medication 12.5 MILLIGRAM(S): at 17:46

## 2020-10-24 RX ADMIN — LEVETIRACETAM 750 MILLIGRAM(S): 250 TABLET, FILM COATED ORAL at 17:46

## 2020-10-24 RX ADMIN — TAMSULOSIN HYDROCHLORIDE 0.4 MILLIGRAM(S): 0.4 CAPSULE ORAL at 22:22

## 2020-10-24 RX ADMIN — RIVAROXABAN 10 MILLIGRAM(S): KIT at 09:25

## 2020-10-24 RX ADMIN — Medication 4 MILLIGRAM(S): at 17:46

## 2020-10-24 RX ADMIN — FINASTERIDE 5 MILLIGRAM(S): 5 TABLET, FILM COATED ORAL at 12:10

## 2020-10-24 RX ADMIN — Medication 60 MILLIGRAM(S): at 17:45

## 2020-10-24 RX ADMIN — LEVETIRACETAM 750 MILLIGRAM(S): 250 TABLET, FILM COATED ORAL at 05:54

## 2020-10-24 RX ADMIN — Medication 12.5 MILLIGRAM(S): at 05:54

## 2020-10-24 NOTE — PROGRESS NOTE ADULT - PROBLEM SELECTOR PLAN 1
tachycardic, leukocytotic w/ lactate elevated to 3.4 -> 2.4 on admission after 2L bolus   - f/u blood cultures  - f/u repeat lactate  - monitor fever curve  - tylenol 650 mg PO q6hPRN for fever tachycardic, leukocytotic w/ lactate elevated to 3.4 -> 2.4on admission after 2L bolus   - f/u blood cultures  - f/u repeat lactate  - monitor fever curve  - tylenol 650 mg PO q6hPRN for fever tachycardic, leukocytotic w/ lactate elevated to 3.4 -> 2.4on admission after 2L bolus   - f/u blood cultures  - lactate normalized  - monitor fever curve  - tylenol 650 mg PO q6hPRN for fever

## 2020-10-24 NOTE — PROGRESS NOTE ADULT - SUBJECTIVE AND OBJECTIVE BOX
INTERVAL EVENTS:    PAST MEDICAL & SURGICAL HISTORY:  Chronic venous insufficiency    Seizure disorder    Cerebrovascular accident (CVA)    Bullous pemphigoid    Spinal stenosis    Atrial fibrillation        MEDICATIONS  (STANDING):  cefTRIAXone   IVPB 2000 milliGRAM(s) IV Intermittent every 24 hours  dextrose 5%. 1000 milliLiter(s) (50 mL/Hr) IV Continuous <Continuous>  dextrose 50% Injectable 12.5 Gram(s) IV Push once  dextrose 50% Injectable 25 Gram(s) IV Push once  dextrose 50% Injectable 25 Gram(s) IV Push once  finasteride 5 milliGRAM(s) Oral daily  folic acid 1 milliGRAM(s) Oral daily  furosemide   Injectable 20 milliGRAM(s) IV Push every 12 hours  insulin lispro (ADMELOG) corrective regimen sliding scale   SubCutaneous Before meals and at bedtime  levETIRAcetam 750 milliGRAM(s) Oral two times a day  metoprolol tartrate 12.5 milliGRAM(s) Oral two times a day  multivitamin  Chewable 1 Tablet(s) Oral daily  predniSONE   Tablet 4 milliGRAM(s) Oral two times a day  rivaroxaban 10 milliGRAM(s) Oral <User Schedule>  tamsulosin 0.4 milliGRAM(s) Oral at bedtime    MEDICATIONS  (PRN):  acetaminophen   Tablet .. 650 milliGRAM(s) Oral every 6 hours PRN Temp greater or equal to 38C (100.4F), Moderate Pain (4 - 6)  dextrose 40% Gel 15 Gram(s) Oral once PRN Blood Glucose LESS THAN 70 milliGRAM(s)/deciliter  glucagon  Injectable 1 milliGRAM(s) IntraMuscular once PRN Glucose LESS THAN 70 milligrams/deciliter    Vital Signs Last 24 Hrs  T(C): 36.6 (24 Oct 2020 05:30), Max: 36.9 (23 Oct 2020 13:30)  T(F): 97.8 (24 Oct 2020 05:30), Max: 98.5 (23 Oct 2020 13:30)  HR: 98 (24 Oct 2020 05:30) (84 - 108)  BP: 118/75 (24 Oct 2020 05:30) (93/60 - 118/75)  BP(mean): --  RR: 20 (24 Oct 2020 05:30) (19 - 22)  SpO2: 99% (24 Oct 2020 05:30) (94% - 100%)    PHYSICAL EXAM:  GEN: NAD  HEENT: EOMI   RESP: CTA b/l  CV: RRR. Normal S1/S2. No m/r/g.  ABD: soft, non-distended  EXT: No edema   NEURO: alert and attentive    LABS:                        12.6   9.35  )-----------( 72       ( 24 Oct 2020 07:03 )             40.2     10-24    143  |  107  |  21  ----------------------------<  255<H>  4.1   |  26  |  0.67    Ca    9.3      24 Oct 2020 07:03  Mg     2.0     10-24    TPro  6.2  /  Alb  3.5  /  TBili  1.0  /  DBili  x   /  AST  27  /  ALT  18  /  AlkPhos  102  10-23    CARDIAC MARKERS ( 23 Oct 2020 00:20 )  x     / <0.01 ng/mL / x     / x     / x          PT/INR - ( 23 Oct 2020 00:20 )   PT: 25.7 sec;   INR: 2.22          PTT - ( 23 Oct 2020 00:20 )  PTT:36.8 sec  Urinalysis Basic - ( 23 Oct 2020 01:58 )    Color: Yellow / Appearance: Clear / S.020 / pH: x  Gluc: x / Ketone: 40 mg/dL  / Bili: Negative / Urobili: 0.2 E.U./dL   Blood: x / Protein: Trace mg/dL / Nitrite: NEGATIVE   Leuk Esterase: NEGATIVE / RBC: 5-10 /HPF / WBC < 5 /HPF   Sq Epi: x / Non Sq Epi: 0-5 /HPF / Bacteria: Present /HPF      I&O's Summary    23 Oct 2020 07:01  -  24 Oct 2020 07:00  --------------------------------------------------------  IN: 500 mL / OUT: 460 mL / NET: 40 mL               INTERVAL EVENTS: NABIL     PAST MEDICAL & SURGICAL HISTORY:  Chronic venous insufficiency    Seizure disorder    Cerebrovascular accident (CVA)    Bullous pemphigoid    Spinal stenosis    Atrial fibrillation    MEDICATIONS  (STANDING):  cefTRIAXone   IVPB 2000 milliGRAM(s) IV Intermittent every 24 hours  dextrose 5%. 1000 milliLiter(s) (50 mL/Hr) IV Continuous <Continuous>  dextrose 50% Injectable 12.5 Gram(s) IV Push once  dextrose 50% Injectable 25 Gram(s) IV Push once  dextrose 50% Injectable 25 Gram(s) IV Push once  finasteride 5 milliGRAM(s) Oral daily  folic acid 1 milliGRAM(s) Oral daily  furosemide   Injectable 20 milliGRAM(s) IV Push every 12 hours  insulin lispro (ADMELOG) corrective regimen sliding scale   SubCutaneous Before meals and at bedtime  levETIRAcetam 750 milliGRAM(s) Oral two times a day  metoprolol tartrate 12.5 milliGRAM(s) Oral two times a day  multivitamin  Chewable 1 Tablet(s) Oral daily  predniSONE   Tablet 4 milliGRAM(s) Oral two times a day  rivaroxaban 10 milliGRAM(s) Oral <User Schedule>  tamsulosin 0.4 milliGRAM(s) Oral at bedtime    MEDICATIONS  (PRN):  acetaminophen   Tablet .. 650 milliGRAM(s) Oral every 6 hours PRN Temp greater or equal to 38C (100.4F), Moderate Pain (4 - 6)  dextrose 40% Gel 15 Gram(s) Oral once PRN Blood Glucose LESS THAN 70 milliGRAM(s)/deciliter  glucagon  Injectable 1 milliGRAM(s) IntraMuscular once PRN Glucose LESS THAN 70 milligrams/deciliter    Vital Signs Last 24 Hrs  T(C): 36.6 (24 Oct 2020 05:30), Max: 36.9 (23 Oct 2020 13:30)  T(F): 97.8 (24 Oct 2020 05:30), Max: 98.5 (23 Oct 2020 13:30)  HR: 98 (24 Oct 2020 05:30) (84 - 108)  BP: 118/75 (24 Oct 2020 05:30) (93/60 - 118/75)  BP(mean): --  RR: 20 (24 Oct 2020 05:30) (19 - 22)  SpO2: 99% (24 Oct 2020 05:30) (94% - 100%)    PHYSICAL EXAM:  GEN: NAD  HEENT: EOMI   RESP: mild bibasilar crackles  CV: RRR. Normal S1/S2. No m/r/g.  ABD: soft, non-distended  EXT: pitting edema b/l  NEURO: alert and attentive    LABS:                        12.6   9.35  )-----------( 72       ( 24 Oct 2020 07:03 )             40.2     10-24    143  |  107  |  21  ----------------------------<  255<H>  4.1   |  26  |  0.67    Ca    9.3      24 Oct 2020 07:03  Mg     2.0     10-24    TPro  6.2  /  Alb  3.5  /  TBili  1.0  /  DBili  x   /  AST  27  /  ALT  18  /  AlkPhos  102  10-23    CARDIAC MARKERS ( 23 Oct 2020 00:20 )  x     / <0.01 ng/mL / x     / x     / x          PT/INR - ( 23 Oct 2020 00:20 )   PT: 25.7 sec;   INR: 2.22          PTT - ( 23 Oct 2020 00:20 )  PTT:36.8 sec  Urinalysis Basic - ( 23 Oct 2020 01:58 )    Color: Yellow / Appearance: Clear / S.020 / pH: x  Gluc: x / Ketone: 40 mg/dL  / Bili: Negative / Urobili: 0.2 E.U./dL   Blood: x / Protein: Trace mg/dL / Nitrite: NEGATIVE   Leuk Esterase: NEGATIVE / RBC: 5-10 /HPF / WBC < 5 /HPF   Sq Epi: x / Non Sq Epi: 0-5 /HPF / Bacteria: Present /HPF      I&O's Summary    23 Oct 2020 07:01  -  24 Oct 2020 07:00  --------------------------------------------------------  IN: 500 mL / OUT: 460 mL / NET: 40 mL

## 2020-10-24 NOTE — PROGRESS NOTE ADULT - PROBLEM SELECTOR PLAN 3
s/p vancomycin 1.5g in ED  - c/w with ceftriaxone 1g IV q24h  - azithromycin 500 mg PO daily for 3 days  - supplemental O2 as needed Possibly aspiration/CAP. Barium swallowing showing aspiration.   s/p vancomycin 1.5g in ED  - c/w with ceftriaxone 1g IV q24h  - azithromycin 500 mg PO dc'd as legionella negative  - supplemental O2 as needed  - S&S: nectar thick liquid only, spoon only. no cups or straws.  - f/u esophagram

## 2020-10-24 NOTE — PROGRESS NOTE ADULT - SUBJECTIVE AND OBJECTIVE BOX
OVERNIGHT EVENTS:    SUBJECTIVE / INTERVAL HPI: Patient seen and examined at bedside.     ROS: negative unless otherwise stated above.    VITAL SIGNS:  Vital Signs Last 24 Hrs  T(C): 36.4 (24 Oct 2020 09:14), Max: 36.9 (23 Oct 2020 13:30)  T(F): 97.5 (24 Oct 2020 09:14), Max: 98.5 (23 Oct 2020 13:30)  HR: 104 (24 Oct 2020 09:14) (84 - 108)  BP: 112/76 (24 Oct 2020 09:14) (97/63 - 118/75)  BP(mean): --  RR: 24 (24 Oct 2020 09:14) (19 - 24)  SpO2: 97% (24 Oct 2020 09:14) (94% - 100%)    PHYSICAL EXAM:    General: WDWN  HEENT: NC/AT; PERRL, anicteric sclera; MMM  Neck: supple  Cardiovascular: NRRR; +S1/S2, no r/m/g  Respiratory: CTA B/L; no W/R/R; no retractions or accessory muscle use  Gastrointestinal: soft, NT/ND; +BSx4  Extremities: WWP; no edema, clubbing or cyanosis  Vascular: 2+ radial, DP/PT pulses B/L  Neurological: AAOx3; no focal deficits    MEDICATIONS:  MEDICATIONS  (STANDING):  cefTRIAXone   IVPB 2000 milliGRAM(s) IV Intermittent every 24 hours  dextrose 5%. 1000 milliLiter(s) (50 mL/Hr) IV Continuous <Continuous>  dextrose 50% Injectable 12.5 Gram(s) IV Push once  dextrose 50% Injectable 25 Gram(s) IV Push once  dextrose 50% Injectable 25 Gram(s) IV Push once  finasteride 5 milliGRAM(s) Oral daily  folic acid 1 milliGRAM(s) Oral daily  furosemide   Injectable 20 milliGRAM(s) IV Push every 12 hours  insulin lispro (ADMELOG) corrective regimen sliding scale   SubCutaneous Before meals and at bedtime  levETIRAcetam 750 milliGRAM(s) Oral two times a day  metoprolol tartrate 12.5 milliGRAM(s) Oral two times a day  multivitamin  Chewable 1 Tablet(s) Oral daily  predniSONE   Tablet 4 milliGRAM(s) Oral two times a day  rivaroxaban 10 milliGRAM(s) Oral <User Schedule>  tamsulosin 0.4 milliGRAM(s) Oral at bedtime    MEDICATIONS  (PRN):  acetaminophen   Tablet .. 650 milliGRAM(s) Oral every 6 hours PRN Temp greater or equal to 38C (100.4F), Moderate Pain (4 - 6)  dextrose 40% Gel 15 Gram(s) Oral once PRN Blood Glucose LESS THAN 70 milliGRAM(s)/deciliter  glucagon  Injectable 1 milliGRAM(s) IntraMuscular once PRN Glucose LESS THAN 70 milligrams/deciliter      ALLERGIES:  Allergies    No Known Allergies    Intolerances        LABS:                        12.6   9.35  )-----------( 72       ( 24 Oct 2020 07:03 )             40.2     10-24    143  |  107  |  21  ----------------------------<  255<H>  4.1   |  26  |  0.67    Ca    9.3      24 Oct 2020 07:03  Mg     2.0     10-24    TPro  6.2  /  Alb  3.5  /  TBili  1.0  /  DBili  x   /  AST  27  /  ALT  18  /  AlkPhos  102  10-23    PT/INR - ( 23 Oct 2020 00:20 )   PT: 25.7 sec;   INR: 2.22          PTT - ( 23 Oct 2020 00:20 )  PTT:36.8 sec  Urinalysis Basic - ( 23 Oct 2020 01:58 )    Color: Yellow / Appearance: Clear / S.020 / pH: x  Gluc: x / Ketone: 40 mg/dL  / Bili: Negative / Urobili: 0.2 E.U./dL   Blood: x / Protein: Trace mg/dL / Nitrite: NEGATIVE   Leuk Esterase: NEGATIVE / RBC: 5-10 /HPF / WBC < 5 /HPF   Sq Epi: x / Non Sq Epi: 0-5 /HPF / Bacteria: Present /HPF      CAPILLARY BLOOD GLUCOSE      POCT Blood Glucose.: 246 mg/dL (24 Oct 2020 08:35)      RADIOLOGY & ADDITIONAL TESTS: Reviewed. OVERNIGHT EVENTS: NAEO    SUBJECTIVE / INTERVAL HPI: Patient seen and examined at bedside. Patient not reporting acute complaints. Patient denies fevers, chills, N/S, HA, change in vision/hearing, N/V, cp, SOB, cough, abdominal pain, diarrhea, constipation, hematochezia/melena, dysuria, hematuria, new onset weakness/numbness      ROS: negative unless otherwise stated above.    VITAL SIGNS:  Vital Signs Last 24 Hrs  T(C): 36.4 (24 Oct 2020 09:14), Max: 36.9 (23 Oct 2020 13:30)  T(F): 97.5 (24 Oct 2020 09:14), Max: 98.5 (23 Oct 2020 13:30)  HR: 104 (24 Oct 2020 09:14) (84 - 108)  BP: 112/76 (24 Oct 2020 09:14) (97/63 - 118/75)  BP(mean): --  RR: 24 (24 Oct 2020 09:14) (19 - 24)  SpO2: 97% (24 Oct 2020 09:14) (94% - 100%)    PHYSICAL EXAM:    General: WDWN  HEENT: NC/AT; PERRL, anicteric sclera; MMM  Neck: supple  Cardiovascular: NRRR; +S1/S2, no r/m/g  Respiratory: CTA B/L; no W/R/R; no retractions or accessory muscle use  Gastrointestinal: soft, NT/ND; +BSx4  Extremities: WWP; no edema, clubbing or cyanosis  Vascular: 2+ radial, DP/PT pulses B/L  Neurological: AAOx3; no focal deficits    MEDICATIONS:  MEDICATIONS  (STANDING):  cefTRIAXone   IVPB 2000 milliGRAM(s) IV Intermittent every 24 hours  dextrose 5%. 1000 milliLiter(s) (50 mL/Hr) IV Continuous <Continuous>  dextrose 50% Injectable 12.5 Gram(s) IV Push once  dextrose 50% Injectable 25 Gram(s) IV Push once  dextrose 50% Injectable 25 Gram(s) IV Push once  finasteride 5 milliGRAM(s) Oral daily  folic acid 1 milliGRAM(s) Oral daily  furosemide   Injectable 20 milliGRAM(s) IV Push every 12 hours  insulin lispro (ADMELOG) corrective regimen sliding scale   SubCutaneous Before meals and at bedtime  levETIRAcetam 750 milliGRAM(s) Oral two times a day  metoprolol tartrate 12.5 milliGRAM(s) Oral two times a day  multivitamin  Chewable 1 Tablet(s) Oral daily  predniSONE   Tablet 4 milliGRAM(s) Oral two times a day  rivaroxaban 10 milliGRAM(s) Oral <User Schedule>  tamsulosin 0.4 milliGRAM(s) Oral at bedtime    MEDICATIONS  (PRN):  acetaminophen   Tablet .. 650 milliGRAM(s) Oral every 6 hours PRN Temp greater or equal to 38C (100.4F), Moderate Pain (4 - 6)  dextrose 40% Gel 15 Gram(s) Oral once PRN Blood Glucose LESS THAN 70 milliGRAM(s)/deciliter  glucagon  Injectable 1 milliGRAM(s) IntraMuscular once PRN Glucose LESS THAN 70 milligrams/deciliter      ALLERGIES:  Allergies    No Known Allergies    Intolerances        LABS:                        12.6   9.35  )-----------( 72       ( 24 Oct 2020 07:03 )             40.2     10-24    143  |  107  |  21  ----------------------------<  255<H>  4.1   |  26  |  0.67    Ca    9.3      24 Oct 2020 07:03  Mg     2.0     10-24    TPro  6.2  /  Alb  3.5  /  TBili  1.0  /  DBili  x   /  AST  27  /  ALT  18  /  AlkPhos  102  10-23    PT/INR - ( 23 Oct 2020 00:20 )   PT: 25.7 sec;   INR: 2.22          PTT - ( 23 Oct 2020 00:20 )  PTT:36.8 sec  Urinalysis Basic - ( 23 Oct 2020 01:58 )    Color: Yellow / Appearance: Clear / S.020 / pH: x  Gluc: x / Ketone: 40 mg/dL  / Bili: Negative / Urobili: 0.2 E.U./dL   Blood: x / Protein: Trace mg/dL / Nitrite: NEGATIVE   Leuk Esterase: NEGATIVE / RBC: 5-10 /HPF / WBC < 5 /HPF   Sq Epi: x / Non Sq Epi: 0-5 /HPF / Bacteria: Present /HPF      CAPILLARY BLOOD GLUCOSE      POCT Blood Glucose.: 246 mg/dL (24 Oct 2020 08:35)      RADIOLOGY & ADDITIONAL TESTS: Reviewed. OVERNIGHT EVENTS: NAEO    SUBJECTIVE / INTERVAL HPI: Patient seen and examined at bedside. Patient not reporting acute complaints. Patient denies fevers, chills, N/S, HA, change in vision/hearing, N/V, cp, SOB, cough, abdominal pain, diarrhea, constipation, hematochezia/melena, dysuria, hematuria, new onset weakness/numbness      ROS: negative unless otherwise stated above.    VITAL SIGNS:  Vital Signs Last 24 Hrs  T(C): 36.4 (24 Oct 2020 09:14), Max: 36.9 (23 Oct 2020 13:30)  T(F): 97.5 (24 Oct 2020 09:14), Max: 98.5 (23 Oct 2020 13:30)  HR: 104 (24 Oct 2020 09:14) (84 - 108)  BP: 112/76 (24 Oct 2020 09:14) (97/63 - 118/75)  BP(mean): --  RR: 24 (24 Oct 2020 09:14) (19 - 24)  SpO2: 97% (24 Oct 2020 09:14) (94% - 100%)    PHYSICAL EXAM:    General: Elderly male, lying comfortably in bed, NAD  HEENT: NC/AT; anicteric sclera; MMD  Cardiovascular: NRRR; +S1/S2, systolic cresc-decresc murmur w/ radiatio to carotids best at RUSB  Respiratory: CTA B/L; coarse crackles b/l lower lung fields R>L; no retractions or accessory muscle use  Gastrointestinal: soft, NT/ND; +BSx4  Extremities: WWP; b/l 2+ pitting edema from proximal knee to ankle (R>L), w/ associated erythema, increased warmth, superficial ulcer at medial aspect RLE; scattered UE ecchymoses noted, R>L; no clubbing or cyanosis  Vascular: 2+ radial pulses B/L  Neurological: AAOx1; no focal deficits    MEDICATIONS:  MEDICATIONS  (STANDING):  cefTRIAXone   IVPB 2000 milliGRAM(s) IV Intermittent every 24 hours  dextrose 5%. 1000 milliLiter(s) (50 mL/Hr) IV Continuous <Continuous>  dextrose 50% Injectable 12.5 Gram(s) IV Push once  dextrose 50% Injectable 25 Gram(s) IV Push once  dextrose 50% Injectable 25 Gram(s) IV Push once  finasteride 5 milliGRAM(s) Oral daily  folic acid 1 milliGRAM(s) Oral daily  furosemide   Injectable 20 milliGRAM(s) IV Push every 12 hours  insulin lispro (ADMELOG) corrective regimen sliding scale   SubCutaneous Before meals and at bedtime  levETIRAcetam 750 milliGRAM(s) Oral two times a day  metoprolol tartrate 12.5 milliGRAM(s) Oral two times a day  multivitamin  Chewable 1 Tablet(s) Oral daily  predniSONE   Tablet 4 milliGRAM(s) Oral two times a day  rivaroxaban 10 milliGRAM(s) Oral <User Schedule>  tamsulosin 0.4 milliGRAM(s) Oral at bedtime    MEDICATIONS  (PRN):  acetaminophen   Tablet .. 650 milliGRAM(s) Oral every 6 hours PRN Temp greater or equal to 38C (100.4F), Moderate Pain (4 - 6)  dextrose 40% Gel 15 Gram(s) Oral once PRN Blood Glucose LESS THAN 70 milliGRAM(s)/deciliter  glucagon  Injectable 1 milliGRAM(s) IntraMuscular once PRN Glucose LESS THAN 70 milligrams/deciliter      ALLERGIES:  Allergies    No Known Allergies    Intolerances        LABS:                        12.6   9.35  )-----------( 72       ( 24 Oct 2020 07:03 )             40.2     10-24    143  |  107  |  21  ----------------------------<  255<H>  4.1   |  26  |  0.67    Ca    9.3      24 Oct 2020 07:03  Mg     2.0     10-24    TPro  6.2  /  Alb  3.5  /  TBili  1.0  /  DBili  x   /  AST  27  /  ALT  18  /  AlkPhos  102  10-23    PT/INR - ( 23 Oct 2020 00:20 )   PT: 25.7 sec;   INR: 2.22          PTT - ( 23 Oct 2020 00:20 )  PTT:36.8 sec  Urinalysis Basic - ( 23 Oct 2020 01:58 )    Color: Yellow / Appearance: Clear / S.020 / pH: x  Gluc: x / Ketone: 40 mg/dL  / Bili: Negative / Urobili: 0.2 E.U./dL   Blood: x / Protein: Trace mg/dL / Nitrite: NEGATIVE   Leuk Esterase: NEGATIVE / RBC: 5-10 /HPF / WBC < 5 /HPF   Sq Epi: x / Non Sq Epi: 0-5 /HPF / Bacteria: Present /HPF      CAPILLARY BLOOD GLUCOSE      POCT Blood Glucose.: 246 mg/dL (24 Oct 2020 08:35)      RADIOLOGY & ADDITIONAL TESTS: Reviewed. OVERNIGHT EVENTS: NAEO    SUBJECTIVE / INTERVAL HPI: Patient seen and examined at bedside. Patient not reporting acute complaints. Patient denies fevers, chills, N/S, HA, change in vision/hearing, N/V, cp, SOB, cough, abdominal pain, diarrhea, constipation, hematochezia/melena, dysuria, hematuria, new onset weakness/numbness      ROS: negative unless otherwise stated above.    VITAL SIGNS:  Vital Signs Last 24 Hrs  T(C): 36.4 (24 Oct 2020 09:14), Max: 36.9 (23 Oct 2020 13:30)  T(F): 97.5 (24 Oct 2020 09:14), Max: 98.5 (23 Oct 2020 13:30)  HR: 104 (24 Oct 2020 09:14) (84 - 108)  BP: 112/76 (24 Oct 2020 09:14) (97/63 - 118/75)  BP(mean): --  RR: 24 (24 Oct 2020 09:14) (19 - 24)  SpO2: 97% (24 Oct 2020 09:14) (94% - 100%)    PHYSICAL EXAM:    General: Elderly male, lying comfortably in bed, NAD  HEENT: NC/AT; anicteric sclera; MMD  Cardiovascular: NRRR; +S1/S2, systolic cresc-decresc murmur w/ radiatio to carotids best at RUSB  Respiratory: CTA B/L; coarse crackles b/l lower lung fields R>L; no retractions or accessory muscle use  Gastrointestinal: soft, NT/ND; +BSx4  Extremities: WWP; b/l 2+ pitting edema from proximal knee to ankle (R>L), w/ associated erythema, increased warmth, superficial ulcer at medial aspect RLE; scattered UE ecchymoses noted, R>L; no clubbing or cyanosis  Vascular: 2+ radial pulses B/L  Neurological: AAOx1-2 (name, hospital (but not name of hospital); no focal deficits    MEDICATIONS:  MEDICATIONS  (STANDING):  cefTRIAXone   IVPB 2000 milliGRAM(s) IV Intermittent every 24 hours  dextrose 5%. 1000 milliLiter(s) (50 mL/Hr) IV Continuous <Continuous>  dextrose 50% Injectable 12.5 Gram(s) IV Push once  dextrose 50% Injectable 25 Gram(s) IV Push once  dextrose 50% Injectable 25 Gram(s) IV Push once  finasteride 5 milliGRAM(s) Oral daily  folic acid 1 milliGRAM(s) Oral daily  furosemide   Injectable 20 milliGRAM(s) IV Push every 12 hours  insulin lispro (ADMELOG) corrective regimen sliding scale   SubCutaneous Before meals and at bedtime  levETIRAcetam 750 milliGRAM(s) Oral two times a day  metoprolol tartrate 12.5 milliGRAM(s) Oral two times a day  multivitamin  Chewable 1 Tablet(s) Oral daily  predniSONE   Tablet 4 milliGRAM(s) Oral two times a day  rivaroxaban 10 milliGRAM(s) Oral <User Schedule>  tamsulosin 0.4 milliGRAM(s) Oral at bedtime    MEDICATIONS  (PRN):  acetaminophen   Tablet .. 650 milliGRAM(s) Oral every 6 hours PRN Temp greater or equal to 38C (100.4F), Moderate Pain (4 - 6)  dextrose 40% Gel 15 Gram(s) Oral once PRN Blood Glucose LESS THAN 70 milliGRAM(s)/deciliter  glucagon  Injectable 1 milliGRAM(s) IntraMuscular once PRN Glucose LESS THAN 70 milligrams/deciliter      ALLERGIES:  Allergies    No Known Allergies    Intolerances        LABS:                        12.6   9.35  )-----------( 72       ( 24 Oct 2020 07:03 )             40.2     10-24    143  |  107  |  21  ----------------------------<  255<H>  4.1   |  26  |  0.67    Ca    9.3      24 Oct 2020 07:03  Mg     2.0     10-24    TPro  6.2  /  Alb  3.5  /  TBili  1.0  /  DBili  x   /  AST  27  /  ALT  18  /  AlkPhos  102  10-23    PT/INR - ( 23 Oct 2020 00:20 )   PT: 25.7 sec;   INR: 2.22          PTT - ( 23 Oct 2020 00:20 )  PTT:36.8 sec  Urinalysis Basic - ( 23 Oct 2020 01:58 )    Color: Yellow / Appearance: Clear / S.020 / pH: x  Gluc: x / Ketone: 40 mg/dL  / Bili: Negative / Urobili: 0.2 E.U./dL   Blood: x / Protein: Trace mg/dL / Nitrite: NEGATIVE   Leuk Esterase: NEGATIVE / RBC: 5-10 /HPF / WBC < 5 /HPF   Sq Epi: x / Non Sq Epi: 0-5 /HPF / Bacteria: Present /HPF      CAPILLARY BLOOD GLUCOSE      POCT Blood Glucose.: 246 mg/dL (24 Oct 2020 08:35)      RADIOLOGY & ADDITIONAL TESTS: Reviewed.

## 2020-10-24 NOTE — PROGRESS NOTE ADULT - PROBLEM SELECTOR PLAN 4
patient with increased surrounding warmth, erythema, and increased edema RLE>LLE, likely source from ulcerations over time from bullous pemphigoid and venous stasis  - abx as per above    #Systolic ejection murmur  3/6 with radiation to carotid, RUSB, likely AS. Patient with frequent falls as well, concern for ?severe AS, patient is also on a BB and diuretic, ?history of HF  - echocardiogram  - obtain collateral from PCP  - lasix 20 mg PO as needed  - c/w metoprolol tartrate 12.5 mg BID with holding parameters, uptitrate to 25 mg BID (home dose) when pt BP tolerates    #frequent falls  - physical therapy patient with increased surrounding warmth, erythema, and increased edema RLE>LLE, likely source from ulcerations over time from bullous pemphigoid and venous stasis  - abx as per above    #Systolic ejection murmur  3/6 with radiation to carotid, RUSB, likely AS. Patient with frequent falls as well, concern for ?severe AS, patient is also on a BB and diuretic, ?history of HF  - echocardiogram  - obtain collateral from PCP  - lasix 20 mg PO as needed  - avoid IV fluids/overhydration  - c/w metoprolol tartrate 12.5 mg BID with holding parameters, uptitrate to 25 mg BID (home dose) when pt BP tolerates    #frequent falls  - physical therapy patient with increased surrounding warmth, erythema, and increased edema RLE>LLE, likely source from ulcerations over time from bullous pemphigoid and venous stasis  - abx as per above    #Systolic ejection murmur  3/6 with radiation to carotid, RUSB, likely AS. Patient with frequent falls as well, concern for ?severe AS, patient is also on a BB and diuretic, ?history of HF  - echocardiogram  - obtain collateral from PCP  - lasix 20 mg PO q12h, can decrease if hypotensive  - avoid IV fluids/overhydration  - c/w metoprolol tartrate 12.5 mg BID with holding parameters, uptitrate to 25 mg BID (home dose) when pt BP tolerates  - f/u heart failure recs    #frequent falls  - physical therapy patient with increased surrounding warmth, erythema, and increased edema RLE>LLE, likely source from ulcerations over time from bullous pemphigoid and venous stasis  - abx as per above    #Systolic ejection murmur  3/6 with radiation to carotid, RUSB, likely AS. Patient with frequent falls as well, concern for ?severe AS, patient is also on a BB and diuretic, ?history of HF  - echocardiogram  - obtain collateral from PCP  - lasix 60 mg IV tid hold MAP <65 per cards  - avoid IV fluids/overhydration  - c/w metoprolol tartrate 12.5 mg BID with holding parameters, uptitrate to 25 mg BID (home dose) when pt BP tolerates  - f/u heart failure recs    #frequent falls  - physical therapy patient with increased surrounding warmth, erythema, and increased edema RLE>LLE, likely source from ulcerations over time from bullous pemphigoid and venous stasis  - abx as per above    #Systolic ejection murmur  3/6 with radiation to carotid, RUSB, likely AS. Patient with frequent falls as well, concern for ?severe AS, patient is also on a BB and diuretic, ?history of HF  - echocardiogram  - obtain collateral from PCP  - lasix 60 mg IV tid hold MAP <65 per cards, can decrease if hypotensive  - avoid IV fluids/overhydration  - c/w metoprolol tartrate 12.5 mg BID with holding parameters, uptitrate to 25 mg BID (home dose) when pt BP tolerates  - f/u heart failure recs    #frequent falls  - physical therapy

## 2020-10-24 NOTE — PROGRESS NOTE ADULT - PROBLEM SELECTOR PLAN 10
F: no IVF  E: K>4 Mg>2, monitor and replete as needed  N: NPO pending S&S evaluation  Ppx: rivoraoxaban 10 mg daily for afib  D: RMF  FULL CODE F: no IVF  E: K>4 Mg>2, monitor and replete as needed  N: CC, DASH TLC nectar thick   Ppx: rivoraoxaban 10 mg daily for afib  D: RMF  FULL CODE

## 2020-10-24 NOTE — PROGRESS NOTE ADULT - SUBJECTIVE AND OBJECTIVE BOX
INTERVAL HPI/OVERNIGHT EVENTS:    Patient is a 89y old  Male who presents with a chief complaint of altered mental status (24 Oct 2020 10:25)  Insulin requirements reviewed  eating poorly  wife requesting glucerna/ensure  no BM  no OOB  remains on full liquid diet.   no acute events      Pt reports the following symptoms:    CONSTITUTIONAL:  Negative fever or chills, feels well, good appetite  EYES:  Negative  blurry vision or double vision  CARDIOVASCULAR:  Negative for chest pain or palpitations  RESPIRATORY:  Negative for cough, wheezing, or SOB   GASTROINTESTINAL:  Negative for nausea, vomiting, diarrhea, constipation, or abdominal pain  GENITOURINARY:  Negative frequency, urgency or dysuria  NEUROLOGIC:  No headache, confusion, dizziness, lightheadedness    MEDICATIONS  (STANDING):  cefTRIAXone   IVPB 2000 milliGRAM(s) IV Intermittent every 24 hours  dextrose 5%. 1000 milliLiter(s) (50 mL/Hr) IV Continuous <Continuous>  dextrose 50% Injectable 12.5 Gram(s) IV Push once  dextrose 50% Injectable 25 Gram(s) IV Push once  dextrose 50% Injectable 25 Gram(s) IV Push once  finasteride 5 milliGRAM(s) Oral daily  folic acid 1 milliGRAM(s) Oral daily  furosemide   Injectable 60 milliGRAM(s) IV Push every 8 hours  insulin glargine Injectable (LANTUS) 10 Unit(s) SubCutaneous at bedtime  insulin lispro (ADMELOG) corrective regimen sliding scale   SubCutaneous Before meals and at bedtime  insulin lispro Injectable (ADMELOG) 8 Unit(s) SubCutaneous three times a day before meals  levETIRAcetam 750 milliGRAM(s) Oral two times a day  metoprolol tartrate 12.5 milliGRAM(s) Oral two times a day  multivitamin  Chewable 1 Tablet(s) Oral daily  predniSONE   Tablet 4 milliGRAM(s) Oral two times a day  rivaroxaban 10 milliGRAM(s) Oral <User Schedule>  tamsulosin 0.4 milliGRAM(s) Oral at bedtime    MEDICATIONS  (PRN):  acetaminophen   Tablet .. 650 milliGRAM(s) Oral every 6 hours PRN Temp greater or equal to 38C (100.4F), Moderate Pain (4 - 6)  dextrose 40% Gel 15 Gram(s) Oral once PRN Blood Glucose LESS THAN 70 milliGRAM(s)/deciliter  glucagon  Injectable 1 milliGRAM(s) IntraMuscular once PRN Glucose LESS THAN 70 milligrams/deciliter      Past medical history reviewed  Family history reviewed  Social history reviewed    PHYSICAL EXAM  Vital Signs Last 24 Hrs  T(C): 36.4 (24 Oct 2020 22:19), Max: 36.6 (24 Oct 2020 05:30)  T(F): 97.5 (24 Oct 2020 22:19), Max: 97.8 (24 Oct 2020 05:30)  HR: 100 (24 Oct 2020 22:19) (98 - 104)  BP: 107/55 (24 Oct 2020 22:19) (107/55 - 118/75)  BP(mean): --  RR: 22 (24 Oct 2020 22:19) (20 - 24)  SpO2: 99% (24 Oct 2020 22:19) (97% - 99%)    Constitutional: wn/wd in NAD.   HEENT: NCAT, MMM, OP clear, EOMI, no proptosis or lid retraction  Neck: no thyromegaly or palpable thyroid nodules   Respiratory: lungs CTAB.  Cardiovascular: regular rhythm, normal S1 and S2, no audible murmurs, no peripheral edema  GI: soft, NT/ND, no masses/HSM appreciated.  Neurology: no tremors, DTR 2+  Skin: no visible rashes/lesions  Psychiatric: AAO x 3, normal affect/mood.    LABS:                        12.6   9.35  )-----------( 72       ( 24 Oct 2020 07:03 )             40.2     10-24    143  |  107  |  21  ----------------------------<  255<H>  4.1   |  26  |  0.67    Ca    9.3      24 Oct 2020 07:03  Mg     2.0     10-24    TPro  6.2  /  Alb  3.5  /  TBili  1.0  /  DBili  x   /  AST  27  /  ALT  18  /  AlkPhos  102  10-23    PT/INR - ( 23 Oct 2020 00:20 )   PT: 25.7 sec;   INR: 2.22          PTT - ( 23 Oct 2020 00:20 )  PTT:36.8 sec  Urinalysis Basic - ( 23 Oct 2020 01:58 )    Color: Yellow / Appearance: Clear / S.020 / pH: x  Gluc: x / Ketone: 40 mg/dL  / Bili: Negative / Urobili: 0.2 E.U./dL   Blood: x / Protein: Trace mg/dL / Nitrite: NEGATIVE   Leuk Esterase: NEGATIVE / RBC: 5-10 /HPF / WBC < 5 /HPF   Sq Epi: x / Non Sq Epi: 0-5 /HPF / Bacteria: Present /HPF          HbA1C: 11.7 % (10-23 @ 00:20)      CAPILLARY BLOOD GLUCOSE      POCT Blood Glucose.: 345 mg/dL (24 Oct 2020 22:28)  POCT Blood Glucose.: 289 mg/dL (24 Oct 2020 17:11)  POCT Blood Glucose.: 246 mg/dL (24 Oct 2020 08:35)

## 2020-10-24 NOTE — PROGRESS NOTE ADULT - ASSESSMENT
A/P: 89M with atrial fibrillation (on Xarelto 10 mg daily), spinal stenosis, bullous pemphigoid, b/l LE insufficiency w/ ulceration, DM, history of seizures presents with altered mental status and fever. Adm to [facility] w/concerns for severe sepsis 2/2 gram (+) cocci bacteremia. Heart failure consulted for management recommendations.  - would hold off on further fluid boluses at this time  - strict I/O  - can decrease diuresis as needed if the patient becomes hypotensiv    Pending discussion with cardiology attending.   Prince Fournier MD A/P: 89M with atrial fibrillation (on Xarelto 10 mg daily), spinal stenosis, bullous pemphigoid, b/l LE insufficiency w/ ulceration, DM, history of seizures presents with altered mental status and fever. Adm to [facility] w/concerns for severe sepsis 2/2 gram (+) cocci bacteremia. Heart failure consulted for management recommendations.    - rec lasix 60 IV TID  - strict I/O; replete K/Mg as appropriate, at least BID lytes (TID if necessary)  - can decrease diuresis as needed if the patient becomes hypotensive    Case discussed with cardiology attending.   Prince Fournier MD

## 2020-10-25 DIAGNOSIS — R13.10 DYSPHAGIA, UNSPECIFIED: ICD-10-CM

## 2020-10-25 LAB
-  AMPICILLIN: SIGNIFICANT CHANGE UP
-  AMPICILLIN: SIGNIFICANT CHANGE UP
-  CLINDAMYCIN: SIGNIFICANT CHANGE UP
-  CLINDAMYCIN: SIGNIFICANT CHANGE UP
-  ERYTHROMYCIN: SIGNIFICANT CHANGE UP
-  ERYTHROMYCIN: SIGNIFICANT CHANGE UP
-  LEVOFLOXACIN: SIGNIFICANT CHANGE UP
-  LEVOFLOXACIN: SIGNIFICANT CHANGE UP
-  PENICILLIN: SIGNIFICANT CHANGE UP
-  PENICILLIN: SIGNIFICANT CHANGE UP
-  VANCOMYCIN: SIGNIFICANT CHANGE UP
-  VANCOMYCIN: SIGNIFICANT CHANGE UP
ANION GAP SERPL CALC-SCNC: 10 MMOL/L — SIGNIFICANT CHANGE UP (ref 5–17)
ANION GAP SERPL CALC-SCNC: 11 MMOL/L — SIGNIFICANT CHANGE UP (ref 5–17)
BASOPHILS # BLD AUTO: 0.02 K/UL — SIGNIFICANT CHANGE UP (ref 0–0.2)
BASOPHILS NFR BLD AUTO: 0.3 % — SIGNIFICANT CHANGE UP (ref 0–2)
BUN SERPL-MCNC: 22 MG/DL — SIGNIFICANT CHANGE UP (ref 7–23)
BUN SERPL-MCNC: 22 MG/DL — SIGNIFICANT CHANGE UP (ref 7–23)
CALCIUM SERPL-MCNC: 8.9 MG/DL — SIGNIFICANT CHANGE UP (ref 8.4–10.5)
CALCIUM SERPL-MCNC: 9.2 MG/DL — SIGNIFICANT CHANGE UP (ref 8.4–10.5)
CHLORIDE SERPL-SCNC: 100 MMOL/L — SIGNIFICANT CHANGE UP (ref 96–108)
CHLORIDE SERPL-SCNC: 104 MMOL/L — SIGNIFICANT CHANGE UP (ref 96–108)
CHOLEST SERPL-MCNC: 115 MG/DL — SIGNIFICANT CHANGE UP
CO2 SERPL-SCNC: 31 MMOL/L — SIGNIFICANT CHANGE UP (ref 22–31)
CO2 SERPL-SCNC: 33 MMOL/L — HIGH (ref 22–31)
CREAT SERPL-MCNC: 0.7 MG/DL — SIGNIFICANT CHANGE UP (ref 0.5–1.3)
CREAT SERPL-MCNC: 0.73 MG/DL — SIGNIFICANT CHANGE UP (ref 0.5–1.3)
CULTURE RESULTS: SIGNIFICANT CHANGE UP
CULTURE RESULTS: SIGNIFICANT CHANGE UP
EOSINOPHIL # BLD AUTO: 0.12 K/UL — SIGNIFICANT CHANGE UP (ref 0–0.5)
EOSINOPHIL NFR BLD AUTO: 1.7 % — SIGNIFICANT CHANGE UP (ref 0–6)
GLUCOSE BLDC GLUCOMTR-MCNC: 137 MG/DL — HIGH (ref 70–99)
GLUCOSE BLDC GLUCOMTR-MCNC: 176 MG/DL — HIGH (ref 70–99)
GLUCOSE BLDC GLUCOMTR-MCNC: 181 MG/DL — HIGH (ref 70–99)
GLUCOSE BLDC GLUCOMTR-MCNC: 226 MG/DL — HIGH (ref 70–99)
GLUCOSE BLDC GLUCOMTR-MCNC: 256 MG/DL — HIGH (ref 70–99)
GLUCOSE SERPL-MCNC: 142 MG/DL — HIGH (ref 70–99)
GLUCOSE SERPL-MCNC: 201 MG/DL — HIGH (ref 70–99)
HCT VFR BLD CALC: 39.4 % — SIGNIFICANT CHANGE UP (ref 39–50)
HDLC SERPL-MCNC: 51 MG/DL — SIGNIFICANT CHANGE UP
HGB BLD-MCNC: 12.2 G/DL — LOW (ref 13–17)
IMM GRANULOCYTES NFR BLD AUTO: 5.3 % — HIGH (ref 0–1.5)
LIPID PNL WITH DIRECT LDL SERPL: 47 MG/DL — SIGNIFICANT CHANGE UP
LYMPHOCYTES # BLD AUTO: 0.6 K/UL — LOW (ref 1–3.3)
LYMPHOCYTES # BLD AUTO: 8.3 % — LOW (ref 13–44)
MAGNESIUM SERPL-MCNC: 1.8 MG/DL — SIGNIFICANT CHANGE UP (ref 1.6–2.6)
MAGNESIUM SERPL-MCNC: 2 MG/DL — SIGNIFICANT CHANGE UP (ref 1.6–2.6)
MCHC RBC-ENTMCNC: 31 GM/DL — LOW (ref 32–36)
MCHC RBC-ENTMCNC: 34 PG — SIGNIFICANT CHANGE UP (ref 27–34)
MCV RBC AUTO: 109.7 FL — HIGH (ref 80–100)
METHOD TYPE: SIGNIFICANT CHANGE UP
METHOD TYPE: SIGNIFICANT CHANGE UP
MONOCYTES # BLD AUTO: 0.45 K/UL — SIGNIFICANT CHANGE UP (ref 0–0.9)
MONOCYTES NFR BLD AUTO: 6.2 % — SIGNIFICANT CHANGE UP (ref 2–14)
NEUTROPHILS # BLD AUTO: 5.66 K/UL — SIGNIFICANT CHANGE UP (ref 1.8–7.4)
NEUTROPHILS NFR BLD AUTO: 78.2 % — HIGH (ref 43–77)
NON HDL CHOLESTEROL: 64 MG/DL — SIGNIFICANT CHANGE UP
NRBC # BLD: 0 /100 WBCS — SIGNIFICANT CHANGE UP (ref 0–0)
ORGANISM # SPEC MICROSCOPIC CNT: SIGNIFICANT CHANGE UP
PHOSPHATE SERPL-MCNC: 3 MG/DL — SIGNIFICANT CHANGE UP (ref 2.5–4.5)
PLATELET # BLD AUTO: 91 K/UL — LOW (ref 150–400)
POTASSIUM SERPL-MCNC: 3.4 MMOL/L — LOW (ref 3.5–5.3)
POTASSIUM SERPL-MCNC: 3.6 MMOL/L — SIGNIFICANT CHANGE UP (ref 3.5–5.3)
POTASSIUM SERPL-SCNC: 3.4 MMOL/L — LOW (ref 3.5–5.3)
POTASSIUM SERPL-SCNC: 3.6 MMOL/L — SIGNIFICANT CHANGE UP (ref 3.5–5.3)
PROCALCITONIN SERPL-MCNC: 1.66 NG/ML — HIGH (ref 0.02–0.1)
RBC # BLD: 3.59 M/UL — LOW (ref 4.2–5.8)
RBC # FLD: 14.5 % — SIGNIFICANT CHANGE UP (ref 10.3–14.5)
SODIUM SERPL-SCNC: 142 MMOL/L — SIGNIFICANT CHANGE UP (ref 135–145)
SODIUM SERPL-SCNC: 147 MMOL/L — HIGH (ref 135–145)
SPECIMEN SOURCE: SIGNIFICANT CHANGE UP
SPECIMEN SOURCE: SIGNIFICANT CHANGE UP
TRIGL SERPL-MCNC: 86 MG/DL — SIGNIFICANT CHANGE UP
WBC # BLD: 7.23 K/UL — SIGNIFICANT CHANGE UP (ref 3.8–10.5)
WBC # FLD AUTO: 7.23 K/UL — SIGNIFICANT CHANGE UP (ref 3.8–10.5)

## 2020-10-25 PROCEDURE — 99233 SBSQ HOSP IP/OBS HIGH 50: CPT

## 2020-10-25 PROCEDURE — 99231 SBSQ HOSP IP/OBS SF/LOW 25: CPT

## 2020-10-25 PROCEDURE — 71045 X-RAY EXAM CHEST 1 VIEW: CPT | Mod: 26

## 2020-10-25 RX ORDER — HALOPERIDOL DECANOATE 100 MG/ML
0.5 INJECTION INTRAMUSCULAR ONCE
Refills: 0 | Status: DISCONTINUED | OUTPATIENT
Start: 2020-10-25 | End: 2020-10-25

## 2020-10-25 RX ORDER — FUROSEMIDE 40 MG
40 TABLET ORAL ONCE
Refills: 0 | Status: COMPLETED | OUTPATIENT
Start: 2020-10-25 | End: 2020-10-25

## 2020-10-25 RX ORDER — ACETAZOLAMIDE 250 MG/1
250 TABLET ORAL ONCE
Refills: 0 | Status: COMPLETED | OUTPATIENT
Start: 2020-10-25 | End: 2020-10-25

## 2020-10-25 RX ORDER — INSULIN LISPRO 100/ML
4 VIAL (ML) SUBCUTANEOUS ONCE
Refills: 0 | Status: COMPLETED | OUTPATIENT
Start: 2020-10-25 | End: 2020-10-25

## 2020-10-25 RX ORDER — MAGNESIUM SULFATE 500 MG/ML
2 VIAL (ML) INJECTION ONCE
Refills: 0 | Status: COMPLETED | OUTPATIENT
Start: 2020-10-25 | End: 2020-10-25

## 2020-10-25 RX ORDER — POTASSIUM CHLORIDE 20 MEQ
40 PACKET (EA) ORAL ONCE
Refills: 0 | Status: COMPLETED | OUTPATIENT
Start: 2020-10-25 | End: 2020-10-25

## 2020-10-25 RX ORDER — INSULIN GLARGINE 100 [IU]/ML
10 INJECTION, SOLUTION SUBCUTANEOUS AT BEDTIME
Refills: 0 | Status: DISCONTINUED | OUTPATIENT
Start: 2020-10-25 | End: 2020-10-26

## 2020-10-25 RX ORDER — INSULIN LISPRO 100/ML
10 VIAL (ML) SUBCUTANEOUS
Refills: 0 | Status: DISCONTINUED | OUTPATIENT
Start: 2020-10-25 | End: 2020-10-26

## 2020-10-25 RX ORDER — QUETIAPINE FUMARATE 200 MG/1
25 TABLET, FILM COATED ORAL ONCE
Refills: 0 | Status: COMPLETED | OUTPATIENT
Start: 2020-10-25 | End: 2020-10-25

## 2020-10-25 RX ORDER — HALOPERIDOL DECANOATE 100 MG/ML
1 INJECTION INTRAMUSCULAR ONCE
Refills: 0 | Status: DISCONTINUED | OUTPATIENT
Start: 2020-10-25 | End: 2020-10-25

## 2020-10-25 RX ADMIN — INSULIN GLARGINE 10 UNIT(S): 100 INJECTION, SOLUTION SUBCUTANEOUS at 00:15

## 2020-10-25 RX ADMIN — Medication 1 MILLIGRAM(S): at 11:30

## 2020-10-25 RX ADMIN — ACETAZOLAMIDE 105 MILLIGRAM(S): 250 TABLET ORAL at 11:29

## 2020-10-25 RX ADMIN — Medication 4 UNIT(S): at 00:49

## 2020-10-25 RX ADMIN — Medication 12.5 MILLIGRAM(S): at 17:26

## 2020-10-25 RX ADMIN — Medication 4: at 12:55

## 2020-10-25 RX ADMIN — Medication 6: at 22:06

## 2020-10-25 RX ADMIN — Medication 4 MILLIGRAM(S): at 07:01

## 2020-10-25 RX ADMIN — INSULIN GLARGINE 10 UNIT(S): 100 INJECTION, SOLUTION SUBCUTANEOUS at 22:07

## 2020-10-25 RX ADMIN — Medication 8 UNIT(S): at 09:26

## 2020-10-25 RX ADMIN — Medication 40 MILLIGRAM(S): at 21:16

## 2020-10-25 RX ADMIN — RIVAROXABAN 10 MILLIGRAM(S): KIT at 09:26

## 2020-10-25 RX ADMIN — Medication 8 UNIT(S): at 12:55

## 2020-10-25 RX ADMIN — Medication 40 MILLIGRAM(S): at 15:51

## 2020-10-25 RX ADMIN — Medication 8 UNIT(S): at 18:14

## 2020-10-25 RX ADMIN — Medication 1 TABLET(S): at 11:29

## 2020-10-25 RX ADMIN — Medication 50 GRAM(S): at 21:16

## 2020-10-25 RX ADMIN — LEVETIRACETAM 750 MILLIGRAM(S): 250 TABLET, FILM COATED ORAL at 06:22

## 2020-10-25 RX ADMIN — TAMSULOSIN HYDROCHLORIDE 0.4 MILLIGRAM(S): 0.4 CAPSULE ORAL at 21:16

## 2020-10-25 RX ADMIN — LEVETIRACETAM 750 MILLIGRAM(S): 250 TABLET, FILM COATED ORAL at 17:26

## 2020-10-25 RX ADMIN — Medication 40 MILLIEQUIVALENT(S): at 11:29

## 2020-10-25 RX ADMIN — Medication 4 MILLIGRAM(S): at 17:27

## 2020-10-25 RX ADMIN — Medication 2: at 18:13

## 2020-10-25 RX ADMIN — FINASTERIDE 5 MILLIGRAM(S): 5 TABLET, FILM COATED ORAL at 11:30

## 2020-10-25 RX ADMIN — QUETIAPINE FUMARATE 25 MILLIGRAM(S): 200 TABLET, FILM COATED ORAL at 23:49

## 2020-10-25 RX ADMIN — Medication 40 MILLIEQUIVALENT(S): at 21:16

## 2020-10-25 RX ADMIN — CEFTRIAXONE 100 MILLIGRAM(S): 500 INJECTION, POWDER, FOR SOLUTION INTRAMUSCULAR; INTRAVENOUS at 11:30

## 2020-10-25 NOTE — PROGRESS NOTE ADULT - PROBLEM SELECTOR PLAN 3
patient w/ afib, on low dose Xarelto, 10 mg daily  - c/w Xarelto 10 mg PO q24h  - rate control w/ metoprolol, currently 12.5 mg BID w/ hold parameters

## 2020-10-25 NOTE — PROGRESS NOTE ADULT - SUBJECTIVE AND OBJECTIVE BOX
INTERVAL HPI/OVERNIGHT EVENTS: Patient seen and examined at bedside. No acute events overnight.    VITAL SIGNS:  T(F): 97.6 (10-25-20 @ 08:25)  HR: 90 (10-25-20 @ 08:25)  BP: 106/72 (10-25-20 @ 08:25)  RR: 20 (10-25-20 @ 08:25)  SpO2: 99% (10-25-20 @ 08:25)  Wt(kg): --    10-24-20 @ 07:01  -  10-25-20 @ 07:00  --------------------------------------------------------  IN: 0 mL / OUT: 2115 mL / NET: -2115 mL        PHYSICAL EXAM:    Constitutional: WDWN resting comfortably in bed; NAD  Head: NC/AT  Eyes: PERRL, EOMI, anicteric sclera  ENT: no oropharyngeal erythema or exudates; MMM  Neck: supple; no JVD  Respiratory: CTA B/L; no W/R/R, no retractions  Cardiac: +S1/S2; RRR; no M/R/G; PMI non-displaced  Gastrointestinal: soft, NT/ND; no rebound or guarding; +BSx4  Genitourinary: normal external genitalia  Back: spine midline, no bony tenderness or step-offs; no CVAT B/L  Extremities: WWP, no clubbing or cyanosis; no peripheral edema  Musculoskeletal: NROM x4; no joint swelling, tenderness or erythema  Vascular: 2+ radial, DP/PT pulses B/L  Lymphatic: no submandibular or cervical LAD  Neurologic: AAOx3; CNII-XII grossly intact; no focal deficits  Psychiatric: affect and characteristics of appearance, verbalizations, behaviors are appropriate    MEDICATIONS  (STANDING):  cefTRIAXone   IVPB 2000 milliGRAM(s) IV Intermittent every 24 hours  dextrose 5%. 1000 milliLiter(s) (50 mL/Hr) IV Continuous <Continuous>  dextrose 50% Injectable 12.5 Gram(s) IV Push once  dextrose 50% Injectable 25 Gram(s) IV Push once  dextrose 50% Injectable 25 Gram(s) IV Push once  finasteride 5 milliGRAM(s) Oral daily  folic acid 1 milliGRAM(s) Oral daily  insulin glargine Injectable (LANTUS) 10 Unit(s) SubCutaneous at bedtime  insulin lispro (ADMELOG) corrective regimen sliding scale   SubCutaneous Before meals and at bedtime  insulin lispro Injectable (ADMELOG) 8 Unit(s) SubCutaneous three times a day before meals  levETIRAcetam 750 milliGRAM(s) Oral two times a day  metoprolol tartrate 12.5 milliGRAM(s) Oral two times a day  multivitamin  Chewable 1 Tablet(s) Oral daily  predniSONE   Tablet 4 milliGRAM(s) Oral two times a day  rivaroxaban 10 milliGRAM(s) Oral <User Schedule>  tamsulosin 0.4 milliGRAM(s) Oral at bedtime    MEDICATIONS  (PRN):  acetaminophen   Tablet .. 650 milliGRAM(s) Oral every 6 hours PRN Temp greater or equal to 38C (100.4F), Moderate Pain (4 - 6)  dextrose 40% Gel 15 Gram(s) Oral once PRN Blood Glucose LESS THAN 70 milliGRAM(s)/deciliter  glucagon  Injectable 1 milliGRAM(s) IntraMuscular once PRN Glucose LESS THAN 70 milligrams/deciliter      Allergies    No Known Allergies    Intolerances        LABS:                        12.2   7.23  )-----------( 91       ( 25 Oct 2020 07:03 )             39.4     10-25    147<H>  |  104  |  22  ----------------------------<  142<H>  3.6   |  33<H>  |  0.73    Ca    9.2      25 Oct 2020 07:03  Phos  3.0     10-25  Mg     2.0     10-25                EKG:    Echo:    Cath:    NST:    RADIOLOGY & ADDITIONAL TESTS:   INTERVAL HPI/OVERNIGHT EVENTS: Lasix held overnight d/t c/f hypotension w/. Seen and examined at bedside; denies chest pain, palpitations, SOB    VITAL SIGNS:  T(F): 97.6 (10-25-20 @ 08:25)  HR: 90 (10-25-20 @ 08:25)  BP: 106/72 (10-25-20 @ 08:25)  RR: 20 (10-25-20 @ 08:25)  SpO2: 99% (10-25-20 @ 08:25)  Wt(kg): --    10-24-20 @ 07:01  -  10-25-20 @ 07:00  --------------------------------------------------------  IN: 0 mL / OUT: 2115 mL / NET: -2115 mL        PHYSICAL EXAM:    GEN: Awake, comfortable. NAD.   HEENT: NCAT, PERRL, EOMI. Mucosa dry. No JVD.   RESP: Fine crackles at b/l posterior lung bases  CV: irregularly irregular, normal s1/s2. III/VI DONALDO radiation to axilla.  ABD: Soft, NTND. BS+  EXT: RLE erythematous, warm to touch. LLE cooler w/chronic venous insufficiency skin changes distally. (+) 3+ pitting edema b/l LE. No clubbing, or cyanosis.   NEURO: AAOx1. No focal deficits.    MEDICATIONS  (STANDING):  cefTRIAXone   IVPB 2000 milliGRAM(s) IV Intermittent every 24 hours  dextrose 5%. 1000 milliLiter(s) (50 mL/Hr) IV Continuous <Continuous>  dextrose 50% Injectable 12.5 Gram(s) IV Push once  dextrose 50% Injectable 25 Gram(s) IV Push once  dextrose 50% Injectable 25 Gram(s) IV Push once  finasteride 5 milliGRAM(s) Oral daily  folic acid 1 milliGRAM(s) Oral daily  insulin glargine Injectable (LANTUS) 10 Unit(s) SubCutaneous at bedtime  insulin lispro (ADMELOG) corrective regimen sliding scale   SubCutaneous Before meals and at bedtime  insulin lispro Injectable (ADMELOG) 8 Unit(s) SubCutaneous three times a day before meals  levETIRAcetam 750 milliGRAM(s) Oral two times a day  metoprolol tartrate 12.5 milliGRAM(s) Oral two times a day  multivitamin  Chewable 1 Tablet(s) Oral daily  predniSONE   Tablet 4 milliGRAM(s) Oral two times a day  rivaroxaban 10 milliGRAM(s) Oral <User Schedule>  tamsulosin 0.4 milliGRAM(s) Oral at bedtime    MEDICATIONS  (PRN):  acetaminophen   Tablet .. 650 milliGRAM(s) Oral every 6 hours PRN Temp greater or equal to 38C (100.4F), Moderate Pain (4 - 6)  dextrose 40% Gel 15 Gram(s) Oral once PRN Blood Glucose LESS THAN 70 milliGRAM(s)/deciliter  glucagon  Injectable 1 milliGRAM(s) IntraMuscular once PRN Glucose LESS THAN 70 milligrams/deciliter      Allergies    No Known Allergies    Intolerances        LABS:                        12.2   7.23  )-----------( 91       ( 25 Oct 2020 07:03 )             39.4     10-25    147<H>  |  104  |  22  ----------------------------<  142<H>  3.6   |  33<H>  |  0.73    Ca    9.2      25 Oct 2020 07:03  Phos  3.0     10-25  Mg     2.0     10-25                EKG:    Echo:  < from: TTE Echo Complete w/o Contrast w/ Doppler (10.23.20 @ 10:56) >  CONCLUSIONS:     1. Normal left ventricular cavity size.   2. Mild symmetric left ventricular hypertrophy.   3. Mildly reduced left ventricular systolic function with global hypokinesis, LV EF49%.   4. Normal right ventricular size.   5. Reduced right ventricular systolic function.   6. Moderately dilated left atrium.   7. Mildly dilated right atrium.   8. Mildly dilated aortic root, 3.8 cm.   9. Mildly dilated proximal ascending aorta, 3.7 cm.  10. Severe aortic stenosis, low-flow, low-gradient, GRACE 1.0cm2, PV 2.7m/s, MG 17mmHg, LVOT/AV 0.19, SVI 25ml/m2.  11. Mild aortic regurgitation.  12. At least moderate mitral regurgitation, likely posterior leaflet prolapse.  13. Mild-to-moderate mitral stenosis, MG 5.7mmHg at 76bpm.  14. Mild tricuspid regurgitation.  15. No evidence of pulmonary hypertension, pulmonary artery systolic pressure is 34 mmHg.  16. No pericardial effusion.  17. Consider FANY to better visualize the mitral valve and elucidate the mechanism of mitral regurgitation, if clinically indicated.    < end of copied text >    Cath:    NST:    RADIOLOGY & ADDITIONAL TESTS:

## 2020-10-25 NOTE — PROGRESS NOTE ADULT - SUBJECTIVE AND OBJECTIVE BOX
OVERNIGHT EVENTS:    SUBJECTIVE / INTERVAL HPI: Patient seen and examined at bedside.   Good urine output after lasix changed to 60 TID. Lasix held for reported hypotension.   Awake and alert at bedside today. No respiratory complaints.   Asking for liquids. Given water with a teaspoon and proceeded to have choking episode.     VITAL SIGNS:  Vital Signs Last 24 Hrs  T(C): 36.4 (25 Oct 2020 08:25), Max: 36.5 (25 Oct 2020 02:44)  T(F): 97.6 (25 Oct 2020 08:25), Max: 97.7 (25 Oct 2020 02:44)  HR: 90 (25 Oct 2020 08:25) (90 - 102)  BP: 106/72 (25 Oct 2020 08:25) (100/63 - 113/80)  BP(mean): --  RR: 20 (25 Oct 2020 08:25) (20 - 22)  SpO2: 99% (25 Oct 2020 08:25) (98% - 99%)    PHYSICAL EXAM:    General: elderly, frail, AAOx2  HEENT: NC/AT, dry MM  Cardiovascular: +S1/S2; RRR  Respiratory: b/l bibasilar crackles, poor effort  Gastrointestinal: soft, NT/ND; condom cath  Extremities: RLE w/ erythema from mid thigh down to foot, no fluctuations, tender, warm, otherwise, 2+ pitting edema b/k      MEDICATIONS:  MEDICATIONS  (STANDING):  cefTRIAXone   IVPB 2000 milliGRAM(s) IV Intermittent every 24 hours  dextrose 5%. 1000 milliLiter(s) (50 mL/Hr) IV Continuous <Continuous>  dextrose 50% Injectable 12.5 Gram(s) IV Push once  dextrose 50% Injectable 25 Gram(s) IV Push once  dextrose 50% Injectable 25 Gram(s) IV Push once  finasteride 5 milliGRAM(s) Oral daily  folic acid 1 milliGRAM(s) Oral daily  insulin glargine Injectable (LANTUS) 10 Unit(s) SubCutaneous at bedtime  insulin lispro (ADMELOG) corrective regimen sliding scale   SubCutaneous Before meals and at bedtime  insulin lispro Injectable (ADMELOG) 8 Unit(s) SubCutaneous three times a day before meals  levETIRAcetam 750 milliGRAM(s) Oral two times a day  metoprolol tartrate 12.5 milliGRAM(s) Oral two times a day  multivitamin  Chewable 1 Tablet(s) Oral daily  predniSONE   Tablet 4 milliGRAM(s) Oral two times a day  rivaroxaban 10 milliGRAM(s) Oral <User Schedule>  tamsulosin 0.4 milliGRAM(s) Oral at bedtime    MEDICATIONS  (PRN):  acetaminophen   Tablet .. 650 milliGRAM(s) Oral every 6 hours PRN Temp greater or equal to 38C (100.4F), Moderate Pain (4 - 6)  dextrose 40% Gel 15 Gram(s) Oral once PRN Blood Glucose LESS THAN 70 milliGRAM(s)/deciliter  glucagon  Injectable 1 milliGRAM(s) IntraMuscular once PRN Glucose LESS THAN 70 milligrams/deciliter      ALLERGIES:  Allergies    No Known Allergies    Intolerances        LABS:                        12.2   7.23  )-----------( 91       ( 25 Oct 2020 07:03 )             39.4     10-25    147<H>  |  104  |  22  ----------------------------<  142<H>  3.6   |  33<H>  |  0.73    Ca    9.2      25 Oct 2020 07:03  Phos  3.0     10-25  Mg     2.0     10-25          CAPILLARY BLOOD GLUCOSE      POCT Blood Glucose.: 226 mg/dL (25 Oct 2020 12:51)      RADIOLOGY & ADDITIONAL TESTS: Reviewed.    ASSESSMENT:    PLAN:

## 2020-10-25 NOTE — PROGRESS NOTE ADULT - SUBJECTIVE AND OBJECTIVE BOX
INTERVAL HPI/OVERNIGHT EVENTS:    Patient is a 89y old  Male who presents with a chief complaint of altered mental status (25 Oct 2020 13:12)  No acute overnight events  pt eating well  insulin administration reviewed  steroid administration reviewed.         Pt reports the following symptoms:    CONSTITUTIONAL:  Negative fever or chills, feels well, good appetite  EYES:  Negative  blurry vision or double vision  CARDIOVASCULAR:  Negative for chest pain or palpitations  RESPIRATORY:  Negative for cough, wheezing, or SOB   GASTROINTESTINAL:  Negative for nausea, vomiting, diarrhea, constipation, or abdominal pain  GENITOURINARY:  Negative frequency, urgency or dysuria  NEUROLOGIC:  No headache, confusion, dizziness, lightheadedness    MEDICATIONS  (STANDING):  cefTRIAXone   IVPB 2000 milliGRAM(s) IV Intermittent every 24 hours  dextrose 5%. 1000 milliLiter(s) (50 mL/Hr) IV Continuous <Continuous>  dextrose 50% Injectable 12.5 Gram(s) IV Push once  dextrose 50% Injectable 25 Gram(s) IV Push once  dextrose 50% Injectable 25 Gram(s) IV Push once  finasteride 5 milliGRAM(s) Oral daily  folic acid 1 milliGRAM(s) Oral daily  insulin glargine Injectable (LANTUS) 10 Unit(s) SubCutaneous at bedtime  insulin lispro (ADMELOG) corrective regimen sliding scale   SubCutaneous Before meals and at bedtime  insulin lispro Injectable (ADMELOG) 8 Unit(s) SubCutaneous three times a day before meals  levETIRAcetam 750 milliGRAM(s) Oral two times a day  metoprolol tartrate 12.5 milliGRAM(s) Oral two times a day  multivitamin  Chewable 1 Tablet(s) Oral daily  predniSONE   Tablet 4 milliGRAM(s) Oral two times a day  rivaroxaban 10 milliGRAM(s) Oral <User Schedule>  tamsulosin 0.4 milliGRAM(s) Oral at bedtime    MEDICATIONS  (PRN):  acetaminophen   Tablet .. 650 milliGRAM(s) Oral every 6 hours PRN Temp greater or equal to 38C (100.4F), Moderate Pain (4 - 6)  dextrose 40% Gel 15 Gram(s) Oral once PRN Blood Glucose LESS THAN 70 milliGRAM(s)/deciliter  glucagon  Injectable 1 milliGRAM(s) IntraMuscular once PRN Glucose LESS THAN 70 milligrams/deciliter      Past medical history reviewed  Family history reviewed  Social history reviewed    PHYSICAL EXAM  Vital Signs Last 24 Hrs  T(C): 36.4 (25 Oct 2020 08:25), Max: 36.5 (25 Oct 2020 02:44)  T(F): 97.6 (25 Oct 2020 08:25), Max: 97.7 (25 Oct 2020 02:44)  HR: 90 (25 Oct 2020 08:25) (90 - 102)  BP: 106/72 (25 Oct 2020 08:25) (100/63 - 113/80)  BP(mean): --  RR: 20 (25 Oct 2020 08:25) (20 - 22)  SpO2: 99% (25 Oct 2020 08:25) (98% - 99%)    Constitutional: wn/wd in NAD.   HEENT: NCAT, MMM, OP clear, EOMI, no proptosis or lid retraction  Neck: no thyromegaly or palpable thyroid nodules   Respiratory: lungs CTAB.  Cardiovascular: regular rhythm, normal S1 and S2, no audible murmurs, no peripheral edema  GI: soft, NT/ND, no masses/HSM appreciated.  Neurology: no tremors, DTR 2+  Skin: no visible rashes/lesions  Psychiatric: AAO x 3, normal affect/mood.    LABS:                        12.2   7.23  )-----------( 91       ( 25 Oct 2020 07:03 )             39.4     10-25    147<H>  |  104  |  22  ----------------------------<  142<H>  3.6   |  33<H>  |  0.73    Ca    9.2      25 Oct 2020 07:03  Phos  3.0     10-25  Mg     2.0     10-25              HbA1C: 11.7 % (10-23 @ 00:20)      CAPILLARY BLOOD GLUCOSE      POCT Blood Glucose.: 226 mg/dL (25 Oct 2020 12:51)  POCT Blood Glucose.: 137 mg/dL (25 Oct 2020 09:19)  POCT Blood Glucose.: 181 mg/dL (25 Oct 2020 02:33)  POCT Blood Glucose.: 278 mg/dL (24 Oct 2020 23:54)  POCT Blood Glucose.: 345 mg/dL (24 Oct 2020 22:28)  POCT Blood Glucose.: 289 mg/dL (24 Oct 2020 17:11)

## 2020-10-25 NOTE — PROGRESS NOTE ADULT - ASSESSMENT
89M with atrial fibrillation (on Xarelto 10 mg daily), spinal stenosis, bullous pemphigoid, b/l LE insufficiency w/ ulceration, DM, history of seizures, CVA, presents Strep bacteremia 2/2 cellulitis and HFrEF excerbation.

## 2020-10-25 NOTE — PROGRESS NOTE ADULT - ASSESSMENT
A/P: 89M with atrial fibrillation (on Xarelto 10 mg daily), spinal stenosis, bullous pemphigoid, b/l LE insufficiency w/ ulceration, DM, history of seizures presents with altered mental status and fever. Adm to F w/concerns for severe sepsis 2/2 gram (+) cocci bacteremia. Heart failure consulted for management recommendations.    #HFrEF Exacerbation  - would c/w diuresis given overall volume overload state; can use 40 IV tid today  - strict I/O; replete K/Mg as appropriate, at least BID lytes (TID if necessary)  - will adjust diuresis as needed    Case discussed with cardiology attending.   --  Del Goodwin MD  Cardiology PGY5

## 2020-10-25 NOTE — CHART NOTE - NSCHARTNOTEFT_GEN_A_CORE
Notified by nurse about , pt given Lispro 8 units. Pt evaluated at bedside, AOX3, at baseline PE, asymptomatic.  Recheck , pt received Lantus 10 units and Lispro 4 units, recheck . Pt's 3 am Lasix 60 IV held as /55 and pt already had 1.8L urine output by 3 am , pt w/ hx of sever AS and MR. Notified by nurse about , pt given Lispro 8 units. Pt evaluated at bedside, AOX3, at baseline PE, asymptomatic.  Recheck , pt received Lantus 10 units and Lispro 4 units, recheck . Pt's 3 am Lasix 60 IV held as /55 and pt already had 1.8L urine output by 3 am , pt w/ hx of sever AS and MR. Per HF and primary team notes, ok to hold Lasix dose if pt hypotensive.

## 2020-10-26 DIAGNOSIS — Z51.5 ENCOUNTER FOR PALLIATIVE CARE: ICD-10-CM

## 2020-10-26 DIAGNOSIS — I50.9 HEART FAILURE, UNSPECIFIED: ICD-10-CM

## 2020-10-26 DIAGNOSIS — Z71.89 OTHER SPECIFIED COUNSELING: ICD-10-CM

## 2020-10-26 DIAGNOSIS — G93.41 METABOLIC ENCEPHALOPATHY: ICD-10-CM

## 2020-10-26 DIAGNOSIS — Z66 DO NOT RESUSCITATE: ICD-10-CM

## 2020-10-26 LAB
ALBUMIN SERPL ELPH-MCNC: 3.2 G/DL — LOW (ref 3.3–5)
ALP SERPL-CCNC: 125 U/L — HIGH (ref 40–120)
ALT FLD-CCNC: 36 U/L — SIGNIFICANT CHANGE UP (ref 10–45)
ANION GAP SERPL CALC-SCNC: 12 MMOL/L — SIGNIFICANT CHANGE UP (ref 5–17)
ANION GAP SERPL CALC-SCNC: 12 MMOL/L — SIGNIFICANT CHANGE UP (ref 5–17)
ANISOCYTOSIS BLD QL: SLIGHT — SIGNIFICANT CHANGE UP
APTT BLD: 35.2 SEC — SIGNIFICANT CHANGE UP (ref 27.5–35.5)
AST SERPL-CCNC: 32 U/L — SIGNIFICANT CHANGE UP (ref 10–40)
BASOPHILS # BLD AUTO: 0 K/UL — SIGNIFICANT CHANGE UP (ref 0–0.2)
BASOPHILS NFR BLD AUTO: 0 % — SIGNIFICANT CHANGE UP (ref 0–2)
BILIRUB SERPL-MCNC: 0.8 MG/DL — SIGNIFICANT CHANGE UP (ref 0.2–1.2)
BUN SERPL-MCNC: 24 MG/DL — HIGH (ref 7–23)
BUN SERPL-MCNC: 24 MG/DL — HIGH (ref 7–23)
CALCIUM SERPL-MCNC: 8.8 MG/DL — SIGNIFICANT CHANGE UP (ref 8.4–10.5)
CALCIUM SERPL-MCNC: 9.2 MG/DL — SIGNIFICANT CHANGE UP (ref 8.4–10.5)
CHLORIDE SERPL-SCNC: 101 MMOL/L — SIGNIFICANT CHANGE UP (ref 96–108)
CHLORIDE SERPL-SCNC: 103 MMOL/L — SIGNIFICANT CHANGE UP (ref 96–108)
CO2 SERPL-SCNC: 30 MMOL/L — SIGNIFICANT CHANGE UP (ref 22–31)
CO2 SERPL-SCNC: 31 MMOL/L — SIGNIFICANT CHANGE UP (ref 22–31)
CREAT SERPL-MCNC: 0.75 MG/DL — SIGNIFICANT CHANGE UP (ref 0.5–1.3)
CREAT SERPL-MCNC: 0.77 MG/DL — SIGNIFICANT CHANGE UP (ref 0.5–1.3)
DACRYOCYTES BLD QL SMEAR: SLIGHT — SIGNIFICANT CHANGE UP
EOSINOPHIL # BLD AUTO: 0 K/UL — SIGNIFICANT CHANGE UP (ref 0–0.5)
EOSINOPHIL NFR BLD AUTO: 0 % — SIGNIFICANT CHANGE UP (ref 0–6)
GIANT PLATELETS BLD QL SMEAR: PRESENT — SIGNIFICANT CHANGE UP
GLUCOSE BLDC GLUCOMTR-MCNC: 161 MG/DL — HIGH (ref 70–99)
GLUCOSE BLDC GLUCOMTR-MCNC: 202 MG/DL — HIGH (ref 70–99)
GLUCOSE BLDC GLUCOMTR-MCNC: 209 MG/DL — HIGH (ref 70–99)
GLUCOSE BLDC GLUCOMTR-MCNC: 265 MG/DL — HIGH (ref 70–99)
GLUCOSE SERPL-MCNC: 224 MG/DL — HIGH (ref 70–99)
GLUCOSE SERPL-MCNC: 287 MG/DL — HIGH (ref 70–99)
HCT VFR BLD CALC: 41.3 % — SIGNIFICANT CHANGE UP (ref 39–50)
HGB BLD-MCNC: 13.2 G/DL — SIGNIFICANT CHANGE UP (ref 13–17)
LYMPHOCYTES # BLD AUTO: 0.42 K/UL — LOW (ref 1–3.3)
LYMPHOCYTES # BLD AUTO: 4.3 % — LOW (ref 13–44)
MACROCYTES BLD QL: SLIGHT — SIGNIFICANT CHANGE UP
MAGNESIUM SERPL-MCNC: 2 MG/DL — SIGNIFICANT CHANGE UP (ref 1.6–2.6)
MANUAL SMEAR VERIFICATION: SIGNIFICANT CHANGE UP
MCHC RBC-ENTMCNC: 32 GM/DL — SIGNIFICANT CHANGE UP (ref 32–36)
MCHC RBC-ENTMCNC: 33.9 PG — SIGNIFICANT CHANGE UP (ref 27–34)
MCV RBC AUTO: 106.2 FL — HIGH (ref 80–100)
MONOCYTES # BLD AUTO: 0.09 K/UL — SIGNIFICANT CHANGE UP (ref 0–0.9)
MONOCYTES NFR BLD AUTO: 0.9 % — LOW (ref 2–14)
NEUTROPHILS # BLD AUTO: 9.34 K/UL — HIGH (ref 1.8–7.4)
NEUTROPHILS NFR BLD AUTO: 93.9 % — HIGH (ref 43–77)
NEUTS BAND # BLD: 0.9 % — SIGNIFICANT CHANGE UP (ref 0–8)
OVALOCYTES BLD QL SMEAR: SLIGHT — SIGNIFICANT CHANGE UP
PHOSPHATE SERPL-MCNC: 3.5 MG/DL — SIGNIFICANT CHANGE UP (ref 2.5–4.5)
PLAT MORPH BLD: ABNORMAL
PLATELET # BLD AUTO: 125 K/UL — LOW (ref 150–400)
POIKILOCYTOSIS BLD QL AUTO: SLIGHT — SIGNIFICANT CHANGE UP
POLYCHROMASIA BLD QL SMEAR: SIGNIFICANT CHANGE UP
POTASSIUM SERPL-MCNC: 3.8 MMOL/L — SIGNIFICANT CHANGE UP (ref 3.5–5.3)
POTASSIUM SERPL-MCNC: 4 MMOL/L — SIGNIFICANT CHANGE UP (ref 3.5–5.3)
POTASSIUM SERPL-SCNC: 3.8 MMOL/L — SIGNIFICANT CHANGE UP (ref 3.5–5.3)
POTASSIUM SERPL-SCNC: 4 MMOL/L — SIGNIFICANT CHANGE UP (ref 3.5–5.3)
PROT SERPL-MCNC: 6.3 G/DL — SIGNIFICANT CHANGE UP (ref 6–8.3)
RBC # BLD: 3.89 M/UL — LOW (ref 4.2–5.8)
RBC # FLD: 14 % — SIGNIFICANT CHANGE UP (ref 10.3–14.5)
RBC BLD AUTO: ABNORMAL
SODIUM SERPL-SCNC: 143 MMOL/L — SIGNIFICANT CHANGE UP (ref 135–145)
SODIUM SERPL-SCNC: 146 MMOL/L — HIGH (ref 135–145)
SPHEROCYTES BLD QL SMEAR: SLIGHT — SIGNIFICANT CHANGE UP
WBC # BLD: 9.85 K/UL — SIGNIFICANT CHANGE UP (ref 3.8–10.5)
WBC # FLD AUTO: 9.85 K/UL — SIGNIFICANT CHANGE UP (ref 3.8–10.5)

## 2020-10-26 PROCEDURE — 99497 ADVNCD CARE PLAN 30 MIN: CPT | Mod: 25

## 2020-10-26 PROCEDURE — 99222 1ST HOSP IP/OBS MODERATE 55: CPT

## 2020-10-26 PROCEDURE — 99239 HOSP IP/OBS DSCHRG MGMT >30: CPT

## 2020-10-26 PROCEDURE — 99223 1ST HOSP IP/OBS HIGH 75: CPT

## 2020-10-26 RX ORDER — ACETAZOLAMIDE 250 MG/1
250 TABLET ORAL EVERY 12 HOURS
Refills: 0 | Status: COMPLETED | OUTPATIENT
Start: 2020-10-26 | End: 2020-10-26

## 2020-10-26 RX ORDER — POTASSIUM CHLORIDE 20 MEQ
20 PACKET (EA) ORAL ONCE
Refills: 0 | Status: COMPLETED | OUTPATIENT
Start: 2020-10-26 | End: 2020-10-26

## 2020-10-26 RX ORDER — INSULIN LISPRO 100/ML
12 VIAL (ML) SUBCUTANEOUS
Refills: 0 | Status: DISCONTINUED | OUTPATIENT
Start: 2020-10-26 | End: 2020-10-27

## 2020-10-26 RX ORDER — INSULIN GLARGINE 100 [IU]/ML
15 INJECTION, SOLUTION SUBCUTANEOUS AT BEDTIME
Refills: 0 | Status: DISCONTINUED | OUTPATIENT
Start: 2020-10-26 | End: 2020-10-27

## 2020-10-26 RX ORDER — FUROSEMIDE 40 MG
40 TABLET ORAL EVERY 12 HOURS
Refills: 0 | Status: COMPLETED | OUTPATIENT
Start: 2020-10-26 | End: 2020-10-26

## 2020-10-26 RX ADMIN — INSULIN GLARGINE 15 UNIT(S): 100 INJECTION, SOLUTION SUBCUTANEOUS at 22:06

## 2020-10-26 RX ADMIN — Medication 20 MILLIEQUIVALENT(S): at 07:22

## 2020-10-26 RX ADMIN — LEVETIRACETAM 750 MILLIGRAM(S): 250 TABLET, FILM COATED ORAL at 19:14

## 2020-10-26 RX ADMIN — Medication 12.5 MILLIGRAM(S): at 05:58

## 2020-10-26 RX ADMIN — Medication 12 UNIT(S): at 12:55

## 2020-10-26 RX ADMIN — ACETAZOLAMIDE 250 MILLIGRAM(S): 250 TABLET ORAL at 12:02

## 2020-10-26 RX ADMIN — Medication 2: at 22:06

## 2020-10-26 RX ADMIN — CEFTRIAXONE 100 MILLIGRAM(S): 500 INJECTION, POWDER, FOR SOLUTION INTRAMUSCULAR; INTRAVENOUS at 12:03

## 2020-10-26 RX ADMIN — Medication 12 UNIT(S): at 17:50

## 2020-10-26 RX ADMIN — TAMSULOSIN HYDROCHLORIDE 0.4 MILLIGRAM(S): 0.4 CAPSULE ORAL at 23:09

## 2020-10-26 RX ADMIN — Medication 4: at 12:27

## 2020-10-26 RX ADMIN — RIVAROXABAN 10 MILLIGRAM(S): KIT at 09:30

## 2020-10-26 RX ADMIN — Medication 4 MILLIGRAM(S): at 19:14

## 2020-10-26 RX ADMIN — Medication 1 TABLET(S): at 12:04

## 2020-10-26 RX ADMIN — Medication 4 MILLIGRAM(S): at 05:58

## 2020-10-26 RX ADMIN — Medication 1 MILLIGRAM(S): at 12:03

## 2020-10-26 RX ADMIN — Medication 12.5 MILLIGRAM(S): at 19:14

## 2020-10-26 RX ADMIN — ACETAZOLAMIDE 250 MILLIGRAM(S): 250 TABLET ORAL at 23:10

## 2020-10-26 RX ADMIN — Medication 4: at 17:50

## 2020-10-26 RX ADMIN — Medication 40 MILLIGRAM(S): at 23:10

## 2020-10-26 RX ADMIN — LEVETIRACETAM 750 MILLIGRAM(S): 250 TABLET, FILM COATED ORAL at 05:58

## 2020-10-26 RX ADMIN — FINASTERIDE 5 MILLIGRAM(S): 5 TABLET, FILM COATED ORAL at 12:03

## 2020-10-26 RX ADMIN — Medication 6: at 09:29

## 2020-10-26 RX ADMIN — Medication 40 MILLIGRAM(S): at 12:02

## 2020-10-26 NOTE — PROGRESS NOTE ADULT - SUBJECTIVE AND OBJECTIVE BOX
INTERVAL EVENTS:    Pt seen/examined at bedside. Agitated o/n, req seroquel. Calm but tired appearing this AM. Reports mild SOB at rest unchanged from admission, denies CP. Has not been OOB today    MEDICATIONS  (STANDING):  cefTRIAXone   IVPB 2000 milliGRAM(s) IV Intermittent every 24 hours  dextrose 5%. 1000 milliLiter(s) (50 mL/Hr) IV Continuous <Continuous>  dextrose 50% Injectable 12.5 Gram(s) IV Push once  dextrose 50% Injectable 25 Gram(s) IV Push once  dextrose 50% Injectable 25 Gram(s) IV Push once  finasteride 5 milliGRAM(s) Oral daily  folic acid 1 milliGRAM(s) Oral daily  insulin glargine Injectable (LANTUS) 10 Unit(s) SubCutaneous at bedtime  insulin lispro (ADMELOG) corrective regimen sliding scale   SubCutaneous Before meals and at bedtime  insulin lispro Injectable (ADMELOG) 10 Unit(s) SubCutaneous three times a day before meals  levETIRAcetam 750 milliGRAM(s) Oral two times a day  metoprolol tartrate 12.5 milliGRAM(s) Oral two times a day  multivitamin  Chewable 1 Tablet(s) Oral daily  predniSONE   Tablet 4 milliGRAM(s) Oral two times a day  rivaroxaban 10 milliGRAM(s) Oral <User Schedule>  tamsulosin 0.4 milliGRAM(s) Oral at bedtime    MEDICATIONS  (PRN):  acetaminophen   Tablet .. 650 milliGRAM(s) Oral every 6 hours PRN Temp greater or equal to 38C (100.4F), Moderate Pain (4 - 6)  dextrose 40% Gel 15 Gram(s) Oral once PRN Blood Glucose LESS THAN 70 milliGRAM(s)/deciliter  glucagon  Injectable 1 milliGRAM(s) IntraMuscular once PRN Glucose LESS THAN 70 milligrams/deciliter      Vital Signs Last 24 Hrs  T(C): 36.7 (26 Oct 2020 09:01), Max: 37.2 (25 Oct 2020 23:30)  T(F): 98.1 (26 Oct 2020 09:01), Max: 99 (25 Oct 2020 23:30)  HR: 96 (26 Oct 2020 09:01) (77 - 99)  BP: 104/73 (26 Oct 2020 09:01) (104/73 - 134/87)  BP(mean): --  RR: 21 (26 Oct 2020 09:01) (20 - 22)  SpO2: 96% (26 Oct 2020 09:01) (96% - 100%)     PHYSICAL EXAM:  GEN: Awake, alert. NAD.   HEENT: NCAT, PERRL, EOMI. Mucosa moist. No JVD.  RESP: Limited inspiratory effort, but grossly CTA b/l  CV: RRR. +S1 and S2. 3/6 sys murmur at apex.  ABD: Soft. NT/ND. BS+  EXT: Warm. 2+ pitting edema to thighs, +venous stasis w/chronic skin changes   NEURO: AAOx3. No focal deficits.     LABS:                        13.2   9.85  )-----------( 125      ( 26 Oct 2020 05:59 )             41.3     10-26    143  |  101  |  24<H>  ----------------------------<  287<H>  3.8   |  30  |  0.75    Ca    8.8      26 Oct 2020 05:59  Phos  3.5     10-26  Mg     2.0     10-26    TPro  6.3  /  Alb  3.2<L>  /  TBili  0.8  /  DBili  x   /  AST  32  /  ALT  36  /  AlkPhos  125<H>  10-26        PTT - ( 26 Oct 2020 05:59 )  PTT:35.2 sec    I&O's Summary    25 Oct 2020 07:01  -  26 Oct 2020 07:00  --------------------------------------------------------  IN: 100 mL / OUT: 3050 mL / NET: -2950 mL    26 Oct 2020 07:01  -  26 Oct 2020 10:05  --------------------------------------------------------  IN: 5 mL / OUT: 175 mL / NET: -170 mL      BNP  RADIOLOGY & ADDITIONAL STUDIES:    TELEMETRY: Not on tele    EKG: No new EKG for review

## 2020-10-26 NOTE — PROGRESS NOTE ADULT - ASSESSMENT
A/P: 89M with atrial fibrillation (on Xarelto 10 mg daily), spinal stenosis, bullous pemphigoid, b/l LE insufficiency w/ ulceration, DM, history of seizures presents with altered mental status and fever. Adm to F w/concerns for severe sepsis 2/2 gram (+) cocci bacteremia. Heart failure consulted for management recommendations.    #HFmrEF Exacerbation  - TTE: Norm LV size, mild concentric LVH, mild LVSD (EF 49%). Severe AS w/LFLG (Gmean 17, GRACE 1, DI 0.19, SVI 25). Mild AI. At least mod MR w/likely posterior MV leaflet prolapse, mild-mod MS (Gmean 5.7 @76), and mod LAE (ROMEO 42). Significant MAC. PASP 34. Norm RV size w/RVSD (TAPSE 14, S' 8.6). Mild TR w/mild VIBHA. Mild ao root (3.8cm) and prox asc ao dilation (3.7cm).  - Pt responding well to diuresis, still appears overloaded on exam w/mild hypoxic resp failure.. Would c/w Lasix 40mg IVP BID for today and can start Diamox 250mg PO BID as well to prevent loop diuretic-assoc metabolic alkalosis. Monitor I/O, lytes, replete PRN. Consider ACE-wraps for chronic venous insufficiency  - Multiple valvular abn. Likely paradoxical LFLG AS w/reduced GRACE and DI but low Gmean due to dec SVI. At least mod MR w/likely posterior leaflet prolapse and mild-mod MS. Given advanced age, multiple comorbidities, and deconditioning, would recommend GOC discussion w/pt and family prior to any further w/u. Would likely be poor candidate for intervention.  - Mild dilation of ao root and prox asc ao on TTE. Outpatient f/u    Pt seen and d/w HF consult attending.    --  Gurvinder Lira MD  Cardiology PGY-5

## 2020-10-26 NOTE — PROGRESS NOTE ADULT - PROBLEM SELECTOR PLAN 10
likely 2/2 severe sepsis  -CT head neg  -Continue to correlate clinically likely 2/2 severe sepsis  -CT head neg  -Continue to correlate clinically    F: none, HF  E: replete PRN for K>4, Mg>2  N: CC + nectar thick  Prophylaxis:  DVT: rivaroxaban  GI: none  Dispo: [facility]

## 2020-10-26 NOTE — PROGRESS NOTE ADULT - SUBJECTIVE AND OBJECTIVE BOX
Physical Medicine and Rehabilitation Progress Note:    Patient is a 89y old  Male who presents with a chief complaint of altered mental status (26 Oct 2020 10:04)      HPI:  89M with atrial fibrillation (on Xarelto 10 mg daily), spinal stenosis, bullous pemphigoid, b/l LE insufficiency w/ ulceration, DM, history of seizures presents with altered mental status and fever. History obtained from overnight ED provider documentation and wife, given patient is a poor historian. Wife states that patient did not act like himself overnight, was noted to be sitting on the toilet for approximately 3 hours. Patient did not fall (however, per patient, he states he does fall on occasion). Wife denied any head trauma, nausea/vomiting, diarrhea. She assists him with his medications daily. Patient himself states he "feels well," and is not aware of the reason why he is in the hospital, nor does he realize he was in the hospital. Patient denies any fever, chills, nausea, vomiting, changes in bowel habits, changes in urinary habits - he does not recollect sitting on the toilet for that long of a period yesterday.    ED vitals: T 100.5, HR , /74, RR 18, O2 sat 93% 6 LPM NC  ED labs: WBC 13.04, Hb 13.2, Plt 96, neutro 87.2%, INR 2.22, Lactate 3.4-> 2.4, Gluc 556, A1c 11.7, Betahydroxy 1.4, BNP 1878; U/A +blood, bacteria, ketone 40, gluc >1000; COVID-19 PCR neg, RSV neg  ED studies: CT head no acute intracranial hemorrhage. CXR cardiomegaly, thoracic aortic calcification, L basilar opacity/pleural effusion (23 Oct 2020 07:05)                            13.2   9.85  )-----------( 125      ( 26 Oct 2020 05:59 )             41.3       10-26    143  |  101  |  24<H>  ----------------------------<  287<H>  3.8   |  30  |  0.75    Ca    8.8      26 Oct 2020 05:59  Phos  3.5     10-26  Mg     2.0     10-26    TPro  6.3  /  Alb  3.2<L>  /  TBili  0.8  /  DBili  x   /  AST  32  /  ALT  36  /  AlkPhos  125<H>  10-26    Vital Signs Last 24 Hrs  T(C): 36.7 (26 Oct 2020 09:01), Max: 37.2 (25 Oct 2020 23:30)  T(F): 98.1 (26 Oct 2020 09:01), Max: 99 (25 Oct 2020 23:30)  HR: 96 (26 Oct 2020 09:01) (77 - 99)  BP: 104/73 (26 Oct 2020 09:01) (104/73 - 134/87)  BP(mean): --  RR: 21 (26 Oct 2020 09:01) (20 - 22)  SpO2: 96% (26 Oct 2020 09:01) (96% - 100%)    MEDICATIONS  (STANDING):  acetaZOLAMIDE    Tablet 250 milliGRAM(s) Oral every 12 hours  cefTRIAXone   IVPB 2000 milliGRAM(s) IV Intermittent every 24 hours  dextrose 5%. 1000 milliLiter(s) (50 mL/Hr) IV Continuous <Continuous>  dextrose 50% Injectable 12.5 Gram(s) IV Push once  dextrose 50% Injectable 25 Gram(s) IV Push once  dextrose 50% Injectable 25 Gram(s) IV Push once  finasteride 5 milliGRAM(s) Oral daily  folic acid 1 milliGRAM(s) Oral daily  furosemide   Injectable 40 milliGRAM(s) IV Push every 12 hours  insulin glargine Injectable (LANTUS) 10 Unit(s) SubCutaneous at bedtime  insulin lispro (ADMELOG) corrective regimen sliding scale   SubCutaneous Before meals and at bedtime  insulin lispro Injectable (ADMELOG) 12 Unit(s) SubCutaneous three times a day before meals  levETIRAcetam 750 milliGRAM(s) Oral two times a day  metoprolol tartrate 12.5 milliGRAM(s) Oral two times a day  multivitamin  Chewable 1 Tablet(s) Oral daily  predniSONE   Tablet 4 milliGRAM(s) Oral two times a day  rivaroxaban 10 milliGRAM(s) Oral <User Schedule>  tamsulosin 0.4 milliGRAM(s) Oral at bedtime    MEDICATIONS  (PRN):  acetaminophen   Tablet .. 650 milliGRAM(s) Oral every 6 hours PRN Temp greater or equal to 38C (100.4F), Moderate Pain (4 - 6)  dextrose 40% Gel 15 Gram(s) Oral once PRN Blood Glucose LESS THAN 70 milliGRAM(s)/deciliter  glucagon  Injectable 1 milliGRAM(s) IntraMuscular once PRN Glucose LESS THAN 70 milligrams/deciliter    Currently Undergoing Physical/ Occupational Therapy at bedside.    Functional Status Assessment:       Therapeutic Interventions      Bed Mobility  Bed Mobility Training Rolling/Turning: minimum assist (75% patient effort);  1 person assist;  nonverbal cues (demo/gestures);  verbal cues  Bed Mobility Training Scooting: moderate assist (50% patient effort);  1 person assist;  nonverbal cues (demo/gestures);  verbal cues  Bed Mobility Training Sit-to-Supine: moderate assist (50% patient effort);  1 person assist;  nonverbal cues (demo/gestures);  verbal cues  Bed Mobility Training Supine-to-Sit: moderate assist (50% patient effort);  maximum assist (25% patient effort);  1 person assist;  nonverbal cues (demo/gestures);  verbal cues  Bed Mobility Training Limitations: decreased ability to use arms for pushing/pulling;  decreased ability to use legs for bridging/pushing;  impaired ability to control trunk for mobility;  decreased strength;  impaired balance;  impaired postural control    Sit-Stand Transfer Training  Transfer Training Sit-to-Stand Transfer: moderate assist (50% patient effort);  1 person assist;  nonverbal cues (demo/gestures);  verbal cues;  weight-bearing as tolerated   rolling walker  Transfer Training Stand-to-Sit Transfer: minimum assist (75% patient effort);  1 person assist;  nonverbal cues (demo/gestures);  verbal cues;  contact guard;  weight-bearing as tolerated   rolling walker  Sit-to-Stand Transfer Training Transfer Safety Analysis: decreased balance;  decreased strength;  impaired balance;  impaired postural control    Gait Training  Gait Training: minimum assist (75% patient effort);  contact guard;  1 person assist;  nonverbal cues (demo/gestures);  verbal cues;  weight-bearing as tolerated   rolling walker;  20 feet  Gait Analysis: 3-point gait   decreased haroldo;  decreased step length;  decreased strength;  impaired balance;  impaired postural control          PM&R Impression: as above    Current Disposition Plan: subacute rehab placement

## 2020-10-26 NOTE — CONSULT NOTE ADULT - PROBLEM SELECTOR RECOMMENDATION 2
-see 10/26 [facility] note  -DNR/DNI  -No PEG tube; family wishes for pleasure feeds when indicated

## 2020-10-26 NOTE — PROGRESS NOTE ADULT - PROBLEM SELECTOR PLAN 8
patient noted to be acting differently prior to admission, sat on the toilet for >3 hours. patient possibly fell asleep, and patient was stuck to the toilet possibly  - likely 2/2 underlying infection  - CT head negative  - continue to correlate clinically
patient w/ afib, on low dose Xarelto, 10 mg daily  - c/w Xarelto 10 mg PO q24h  - rate control w/ metoprolol, currently 12.5 mg BID w/ hold parameters

## 2020-10-26 NOTE — GOALS OF CARE CONVERSATION - ADVANCED CARE PLANNING - CONVERSATION DETAILS
Spoke to patient healthcare surrogate, Wife Laureen Florez. She is heavily involved in patient's care and states that patient would not want any forms of aggressive life-saving measures and no life support measures. She states that he would not want CPR and would not want airway intubation. I discussed the current treatment of pneumonia, cellulitis, addressing dysphagia, and having heart failure evaluate his heart given recent echocardiogram findings. He has severe AS, and she states he would not have wanted any heart surgery/valve replacement surgery as well.    GERMAINE completed.
In addition to the EM visit, an advance care planning meeting was performed  Start time: 03:00p  End time: 3:30 p  Total time: 30 min  A face to face meeting to discuss advance care planning was held today regarding: DANIELE WILKS  Primary decision maker: pt's wife, Laureen  Alternate/surrogate: not listed  Discussed advance directives including, but not limited to, healthcare proxy and code status.  Decision regarding code status: DNR/DNI  [facility]    Pt's chart and PMHx thoroughly reviewed. Meeting held at pt's bedside with pt's wife, Laureen. We discussed pt's progressive and worsening dysphagia prior to this admission and the possibility that dysphagia may become worse in the future. Laureen with good insight, acknowledging pt's multiple comorbidities and advanced age. She explicitly states that she is NOT in agreement with pursing PEG tube placement in the event that pt cannot safely swallow. "He is 89 years old, I don't want to torture him," she states. Rather she would like pt to have pleasure feeds,  food for comfort understanding that pt may aspirate. Laureen aware that pt seen by [facility] and are recommending nectar thick liquid diet.     We discussed code status, She maintains DNR/DNI order.     Laureen endorses concern about pt's HF exacerbation. She wants more information and feedback from pt's cardiologist at [place]. We discussed that during this admission pt was seen by HF team, and in the setting of pt's advanced age, multiple comorbidities, and deconditioning, he would be a poor candidate for intervention. This NP shared that at this time, pt is being diuresed and no intervention is planned. Laureen appreciative of support and visit.

## 2020-10-26 NOTE — PROGRESS NOTE ADULT - PROBLEM SELECTOR PLAN 4
patient with increased surrounding warmth, erythema, and increased edema RLE>LLE, likely source from ulcerations over time from bullous pemphigoid and venous stasis    -abx as above

## 2020-10-26 NOTE — CONSULT NOTE ADULT - ASSESSMENT
89M with atrial fibrillation (on Xarelto 10 mg daily), spinal stenosis, bullous pemphigoid, b/l LE insufficiency w/ ulceration, DM, history of seizures, CVA, presents Strep bacteremia 2/2 cellulitis and HFrEF excerbation. Palliative care consulted for GOC in the setting of progressive dysphagia. 89M with atrial fibrillation, spinal stenosis, bullous pemphigoid, b/l LE insufficiency w/ ulceration, DM, history of seizures, CVA, presents Strep bacteremia 2/2 cellulitis and HFrEF excerbation. Palliative care consulted for GOC in the setting of progressive dysphagia and multiple co morbs.

## 2020-10-26 NOTE — PROGRESS NOTE ADULT - PROBLEM SELECTOR PLAN 1
-2/2 strep bacteremia from cellulitis  -c/w ceftraixone 2g q24h for total 14 day course  -will continue abx at [facility]  -no more IVF -2/2 strep bacteremia from cellulitis  -c/w ceftraixone 2g q24h for total 14 day course (10/23-11/5)  -will continue abx at [facility]  -no more IVF

## 2020-10-26 NOTE — PROGRESS NOTE ADULT - PROBLEM SELECTOR PLAN 3
TTE (10/23) showing LV EF49%, moderately dilated left atrium, mildly dilated right atrium, mildly dilated aortic root, 3.8 cm, mildly dilated proximal ascending aorta, 3.7 cm, severe aortic stenosis, mild aortic regurgitation, at least moderate mitral regurgitation, likely posterior leaflet prolapse, mild-to-moderate mitral stenosis, mild tricuspid regurgitation, no evidence of pulmonary hypertension  -plan as above  -f/u HF recs  -GOC discussion, per HF patient poor candidate for intervention

## 2020-10-26 NOTE — PROGRESS NOTE ADULT - ASSESSMENT
per Internal Medicine    89M with atrial fibrillation (on Xarelto 10 mg daily), spinal stenosis, bullous pemphigoid, b/l LE insufficiency w/ ulceration, DM, history of seizures, CVA, presents Strep bacteremia 2/2 cellulitis and HFrEF excerbation.   - Severe sepsis: -2/2 strep bacteremia from cellulitis, CW CTX, will need 2 weeks. Depending on dispo will place either midline for IV home infusion vs MALOU   - Acute hypoxemic respiratory failure.  2/2 HF exacerbation, HF following, rec diamox 250cc BID with IV lasix 40IV BID, monitor UOP  - A fib: cw xarelto and BB  - Bullous pemphigoid.  c/w prednisone 4mg PO BID  - DM: uncontrolled this AM, increase premeal to 12 units, will increased Bedtime- will require 16 per needs yesterday however will f/u endo recs   - Dysphagia: GOC w/ family about comfort feeds, patient asks for liquids but aspirates, palliative consult .

## 2020-10-26 NOTE — CONSULT NOTE ADULT - SUBJECTIVE AND OBJECTIVE BOX
DANIELE WILKS          [id number]               1924    HPI:  89M with atrial fibrillation (on Xarelto 10 mg daily), spinal stenosis, bullous pemphigoid, b/l LE insufficiency w/ ulceration, DM, history of seizures presents with altered mental status and fever. History obtained from overnight ED provider documentation and wife, given patient is a poor historian. Wife states that patient did not act like himself overnight, was noted to be sitting on the toilet for approximately 3 hours. Patient did not fall (however, per patient, he states he does fall on occasion). Wife denied any head trauma, nausea/vomiting, diarrhea. She assists him with his medications daily. Patient himself states he "feels well," and is not aware of the reason why he is in the hospital, nor does he realize he was in the hospital. Patient denies any fever, chills, nausea, vomiting, changes in bowel habits, changes in urinary habits - he does not recollect sitting on the toilet for that long of a period yesterday.    ED vitals: T 100.5, HR , /74, RR 18, O2 sat 93% 6 LPM NC  ED labs: WBC 13.04, Hb 13.2, Plt 96, neutro 87.2%, INR 2.22, Lactate 3.4-> 2.4, Gluc 556, A1c 11.7, Betahydroxy 1.4, BNP 1878; U/A +blood, bacteria, ketone 40, gluc >1000; COVID-19 PCR neg, RSV neg  ED studies: CT head no acute intracranial hemorrhage. CXR cardiomegaly, thoracic aortic calcification, L basilar opacity/pleural effusion (23 Oct 2020 07:05)      PAST MEDICAL & SURGICAL HISTORY:  Chronic venous insufficiency  Seizure disorder  Cerebrovascular accident (CVA)  Bullous pemphigoid  Spinal stenosis  Atrial fibrillation    FAMILY HISTORY:   Reviewed and found non contributory in mother or father    SOCIAL HISTORY: pt  to his wife, Laureen, for 60 years. Has one adult son, who works as a  in [place]. Retired .     ROS:    Unable to attain full ROS: pt ao to name, place, situation. poor insight into why he is at hospital. withdrawn                    Dyspnea (Paul 0-10):   0                     N/V (Y/N):       N                       Secretions (Y/N) :         N       Agitation(Y/N): Y OVN given Seroquel 25mg with good effect  Pain (Y/N):     BLE    Resp:  Denied  GI:  Denied Last BM:   :  Denied  Musc:  Denied    Allergies  No Known Allergies  Intolerances    Opiate Naive (Y/N): Y  -iStop reviewed (Y/N): Yes. Tramadol Rx found on iStop review (Ref#:   [id number]        )    Medications:      MEDICATIONS  (STANDING):  acetaZOLAMIDE    Tablet 250 milliGRAM(s) Oral every 12 hours  cefTRIAXone   IVPB 2000 milliGRAM(s) IV Intermittent every 24 hours  dextrose 5%. 1000 milliLiter(s) (50 mL/Hr) IV Continuous <Continuous>  dextrose 50% Injectable 12.5 Gram(s) IV Push once  dextrose 50% Injectable 25 Gram(s) IV Push once  dextrose 50% Injectable 25 Gram(s) IV Push once  finasteride 5 milliGRAM(s) Oral daily  folic acid 1 milliGRAM(s) Oral daily  furosemide   Injectable 40 milliGRAM(s) IV Push every 12 hours  insulin glargine Injectable (LANTUS) 10 Unit(s) SubCutaneous at bedtime  insulin lispro (ADMELOG) corrective regimen sliding scale   SubCutaneous Before meals and at bedtime  insulin lispro Injectable (ADMELOG) 12 Unit(s) SubCutaneous three times a day before meals  levETIRAcetam 750 milliGRAM(s) Oral two times a day  metoprolol tartrate 12.5 milliGRAM(s) Oral two times a day  multivitamin  Chewable 1 Tablet(s) Oral daily  predniSONE   Tablet 4 milliGRAM(s) Oral two times a day  rivaroxaban 10 milliGRAM(s) Oral <User Schedule>  tamsulosin 0.4 milliGRAM(s) Oral at bedtime    MEDICATIONS  (PRN):  acetaminophen   Tablet .. 650 milliGRAM(s) Oral every 6 hours PRN Temp greater or equal to 38C (100.4F), Moderate Pain (4 - 6)  dextrose 40% Gel 15 Gram(s) Oral once PRN Blood Glucose LESS THAN 70 milliGRAM(s)/deciliter  glucagon  Injectable 1 milliGRAM(s) IntraMuscular once PRN Glucose LESS THAN 70 milligrams/deciliter      Labs:    CBC:                        13.2   9.85  )-----------( 125      ( 26 Oct 2020 05:59 )             41.3     CMP:    10-26    146<H>  |  103  |  24<H>  ----------------------------<  224<H>  4.0   |  31  |  0.77    Ca    9.2      26 Oct 2020 15:03  Phos  3.5     10-26  Mg     2.0     10-26    TPro  6.3  /  Alb  3.2<L>  /  TBili  0.8  /  DBili  x   /  AST  32  /  ALT  36  /  AlkPhos  125<H>  10-26   Albumin, Serum: 3.2 g/dL (10-26-20 @ 05:59)    PTT - ( 26 Oct 2020 05:59 )  PTT:35.2 sec       Imaging:  Reviewed    PEx:  T(C): 36.5 (10-26-20 @ 16:47), Max: 37.2 (10-25-20 @ 23:30)  HR: 104 (10-26-20 @ 16:47) (80 - 104)  BP: 102/65 (10-26-20 @ 16:47) (102/65 - 134/87)  RR: 20 (10-26-20 @ 16:47) (20 - 22)  SpO2: 99% (10-26-20 @ 16:47) (96% - 99%)  Wt(kg): 90.7   Daily     Daily     General: Elderly male, lying comfortably in bed, NAD  HEENT: NC/AT; anicteric sclera; MMD  Cardiovascular: ; +S1/S2, harsh holosystolic murmur, regularly irregular  Respiratory: CTA B/L; coarse crackles b/l lower lung fields L>R; no retractions or accessory muscle use; NC   Gastrointestinal: soft, NT/ND; distant +BSx4  Extremities: WWP; b/l 2+ pitting edema from proximal knee to ankle (R>L), w/ associated erythema, increased warmth, superficial ulcer at medial aspect RLE; scattered UE ecchymoses noted, R>L; no clubbing or cyanosis  Vascular: 2+ radial pulses B/L  Neurological: AAOx1-2 (name, hospital (but not name of hospital); no focal deficits  Skin: ulcer and discoloration as above  Preadmit Karnofsky: 60 %           Current Karnofsky:  30   %  http://www.npcrc.org/files/news/karnofsky_performance_scale.pdf   http://www.npcrc.org/files/news/palliative_performance_scale_PPSv2.pdf  Cachexia (Y/N): N  BMI: 30.4     Advanced Directives:     DNR/DNI     MOLST not on file in [facility]     HCP, DPOA, Living Will: no formal form not in [facility]    Decision maker: The patient is UNable to participate in complex medical decision making conversations. Pt with poor insight into this hospitalization unable to explain why he is in hospital   Legal surrogate: legally defaults to wife, Laureen    GOALS OF CARE DISCUSSION       Palliative care info/counseling provided	           Advanced Directives addressed please see Advance Care Planning Note below	           Documentation of GOC: 	DNR/DNI; NO feeding tube    REFERRALS: palliative sw following DANIELE WILKS          [id number]               1924    HPI:  89M with atrial fibrillation (on Xarelto 10 mg daily), spinal stenosis, bullous pemphigoid, b/l LE insufficiency w/ ulceration, DM, history of seizures presents with altered mental status and fever. History obtained from overnight ED provider documentation and wife, given patient is a poor historian. Wife states that patient did not act like himself overnight, was noted to be sitting on the toilet for approximately 3 hours. Patient did not fall (however, per patient, he states he does fall on occasion). Wife denied any head trauma, nausea/vomiting, diarrhea. She assists him with his medications daily. Patient himself states he "feels well," and is not aware of the reason why he is in the hospital, nor does he realize he was in the hospital. Patient denies any fever, chills, nausea, vomiting, changes in bowel habits, changes in urinary habits - he does not recollect sitting on the toilet for that long of a period yesterday.    ED vitals: T 100.5, HR , /74, RR 18, O2 sat 93% 6 LPM NC  ED labs: WBC 13.04, Hb 13.2, Plt 96, neutro 87.2%, INR 2.22, Lactate 3.4-> 2.4, Gluc 556, A1c 11.7, Betahydroxy 1.4, BNP 1878; U/A +blood, bacteria, ketone 40, gluc >1000; COVID-19 PCR neg, RSV neg  ED studies: CT head no acute intracranial hemorrhage. CXR cardiomegaly, thoracic aortic calcification, L basilar opacity/pleural effusion (23 Oct 2020 07:05)      PAST MEDICAL & SURGICAL HISTORY:  Chronic venous insufficiency  Seizure disorder  Cerebrovascular accident (CVA)  Bullous pemphigoid  Spinal stenosis  Atrial fibrillation    FAMILY HISTORY:   Reviewed and found non contributory in mother or father    SOCIAL HISTORY: pt  to his wife, Laureen, for 60 years. Has one adult son, who works as a  in [place]. Retired .     ROS:    Unable to attain full ROS: pt ao to name, place, situation. poor insight into why he is at hospital. withdrawn                    Dyspnea (Paul 0-10):   0                     N/V (Y/N):       N                       Secretions (Y/N) :         N       Agitation(Y/N): Y OVN given Seroquel 25mg with good effect  Pain (Y/N):     BLE    Resp:  Denied  GI:  Denied Last BM:   :  Denied  Musc:  Denied    Allergies  No Known Allergies  Intolerances    Opiate Naive (Y/N): Y  -iStop reviewed (Y/N): Yes. Tramadol Rx found on iStop review (Ref#:   [id number]        )    Medications:      MEDICATIONS  (STANDING):  acetaZOLAMIDE    Tablet 250 milliGRAM(s) Oral every 12 hours  cefTRIAXone   IVPB 2000 milliGRAM(s) IV Intermittent every 24 hours  dextrose 5%. 1000 milliLiter(s) (50 mL/Hr) IV Continuous <Continuous>  dextrose 50% Injectable 12.5 Gram(s) IV Push once  dextrose 50% Injectable 25 Gram(s) IV Push once  dextrose 50% Injectable 25 Gram(s) IV Push once  finasteride 5 milliGRAM(s) Oral daily  folic acid 1 milliGRAM(s) Oral daily  furosemide   Injectable 40 milliGRAM(s) IV Push every 12 hours  insulin glargine Injectable (LANTUS) 10 Unit(s) SubCutaneous at bedtime  insulin lispro (ADMELOG) corrective regimen sliding scale   SubCutaneous Before meals and at bedtime  insulin lispro Injectable (ADMELOG) 12 Unit(s) SubCutaneous three times a day before meals  levETIRAcetam 750 milliGRAM(s) Oral two times a day  metoprolol tartrate 12.5 milliGRAM(s) Oral two times a day  multivitamin  Chewable 1 Tablet(s) Oral daily  predniSONE   Tablet 4 milliGRAM(s) Oral two times a day  rivaroxaban 10 milliGRAM(s) Oral <User Schedule>  tamsulosin 0.4 milliGRAM(s) Oral at bedtime    MEDICATIONS  (PRN):  acetaminophen   Tablet .. 650 milliGRAM(s) Oral every 6 hours PRN Temp greater or equal to 38C (100.4F), Moderate Pain (4 - 6)  dextrose 40% Gel 15 Gram(s) Oral once PRN Blood Glucose LESS THAN 70 milliGRAM(s)/deciliter  glucagon  Injectable 1 milliGRAM(s) IntraMuscular once PRN Glucose LESS THAN 70 milligrams/deciliter      Labs:    CBC:                        13.2   9.85  )-----------( 125      ( 26 Oct 2020 05:59 )             41.3     CMP:    10-26    146<H>  |  103  |  24<H>  ----------------------------<  224<H>  4.0   |  31  |  0.77    Ca    9.2      26 Oct 2020 15:03  Phos  3.5     10-26  Mg     2.0     10-26    TPro  6.3  /  Alb  3.2<L>  /  TBili  0.8  /  DBili  x   /  AST  32  /  ALT  36  /  AlkPhos  125<H>  10-26   Albumin, Serum: 3.2 g/dL (10-26-20 @ 05:59)    PTT - ( 26 Oct 2020 05:59 )  PTT:35.2 sec       Imaging:  Reviewed    PEx:  T(C): 36.5 (10-26-20 @ 16:47), Max: 37.2 (10-25-20 @ 23:30)  HR: 104 (10-26-20 @ 16:47) (80 - 104)  BP: 102/65 (10-26-20 @ 16:47) (102/65 - 134/87)  RR: 20 (10-26-20 @ 16:47) (20 - 22)  SpO2: 99% (10-26-20 @ 16:47) (96% - 99%)  Wt(kg): 90.7   Daily     Daily     General: Elderly male, lying comfortably in bed, NAD  HEENT: NC/AT; anicteric sclera; MMD  Cardiovascular: ; +S1/S2, harsh holosystolic murmur, regularly irregular  Respiratory: CTA B/L; coarse crackles b/l lower lung fields L>R; no retractions or accessory muscle use; NC   Gastrointestinal: soft, NT/ND; distant +BSx4  Extremities: WWP; b/l 2+ pitting edema from proximal knee to ankle (R>L), w/ associated erythema, increased warmth, superficial ulcer at medial aspect RLE; scattered UE ecchymoses noted, R>L; no clubbing or cyanosis  Vascular: 2+ radial pulses B/L  Neurological: AAOx1-2 (name, hospital (but not name of hospital); no focal deficits  Skin: ulcer and discoloration as above  Preadmit Karnofsky: 60 %           Current Karnofsky:  30   %  http://www.npcrc.org/files/news/karnofsky_performance_scale.pdf   http://www.npcrc.org/files/news/palliative_performance_scale_PPSv2.pdf  Cachexia (Y/N): N  BMI: 30.4     Advanced Directives:     DNR/DNI     MOLST not on file in [facility]     HCP, DPOA, Living Will: no formal form not in [facility]    Decision maker: The patient is UNable to participate in complex medical decision making conversations. Pt with poor insight into this hospitalization unable to explain why he is in hospital   Legal surrogate: legally defaults to wife, Laureen    GOALS OF CARE DISCUSSION       Palliative care info/counseling provided	           Advanced Directives addressed please see Advance Care Planning Note below	           Documentation of GOC: 	DNR/DNI; NO feeding tube    REFERRALS: palliative sw

## 2020-10-26 NOTE — PROGRESS NOTE ADULT - SUBJECTIVE AND OBJECTIVE BOX
OVERNIGHT EVENTS: 1.8-2L urine output after 9PM. Patient agitated ON, given seroquel 25mg    SUBJECTIVE / INTERVAL HPI: Patient seen and examined at bedside. Patient reporting pain in his b/l LE. Patient denies fevers, chills, N/S, HA, change in vision/hearing, N/V, cp, SOB, cough, abdominal pain, diarrhea, constipation, hematochezia/melena, dysuria, hematuria, new onset weakness/numbness    ROS: negative unless otherwise stated above.    VITAL SIGNS:  Vital Signs Last 24 Hrs  T(C): 36.7 (26 Oct 2020 09:01), Max: 37.2 (25 Oct 2020 23:30)  T(F): 98.1 (26 Oct 2020 09:01), Max: 99 (25 Oct 2020 23:30)  HR: 96 (26 Oct 2020 09:01) (77 - 99)  BP: 104/73 (26 Oct 2020 09:01) (104/73 - 134/87)  BP(mean): --  RR: 21 (26 Oct 2020 09:01) (20 - 22)  SpO2: 96% (26 Oct 2020 09:01) (96% - 100%)    PHYSICAL EXAM:    General: Elderly male, lying comfortably in bed, NAD  HEENT: NC/AT; anicteric sclera; MMD  Cardiovascular: NRRR; +S1/S2, harsh holosystolic murmur best heard at apex  Respiratory: CTA B/L; coarse crackles b/l lower lung fields L>R; no retractions or accessory muscle use  Gastrointestinal: soft, NT/ND; +BSx4  Extremities: WWP; b/l 2+ pitting edema from proximal knee to ankle (R>L), w/ associated erythema, increased warmth, superficial ulcer at medial aspect RLE; scattered UE ecchymoses noted, R>L; no clubbing or cyanosis  Vascular: 2+ radial pulses B/L  Neurological: AAOx1-2 (name, hospital (but not name of hospital); no focal deficits    MEDICATIONS:  MEDICATIONS  (STANDING):  acetaZOLAMIDE    Tablet 250 milliGRAM(s) Oral every 12 hours  cefTRIAXone   IVPB 2000 milliGRAM(s) IV Intermittent every 24 hours  dextrose 5%. 1000 milliLiter(s) (50 mL/Hr) IV Continuous <Continuous>  dextrose 50% Injectable 12.5 Gram(s) IV Push once  dextrose 50% Injectable 25 Gram(s) IV Push once  dextrose 50% Injectable 25 Gram(s) IV Push once  finasteride 5 milliGRAM(s) Oral daily  folic acid 1 milliGRAM(s) Oral daily  furosemide   Injectable 40 milliGRAM(s) IV Push every 12 hours  insulin glargine Injectable (LANTUS) 10 Unit(s) SubCutaneous at bedtime  insulin lispro (ADMELOG) corrective regimen sliding scale   SubCutaneous Before meals and at bedtime  insulin lispro Injectable (ADMELOG) 12 Unit(s) SubCutaneous three times a day before meals  levETIRAcetam 750 milliGRAM(s) Oral two times a day  metoprolol tartrate 12.5 milliGRAM(s) Oral two times a day  multivitamin  Chewable 1 Tablet(s) Oral daily  predniSONE   Tablet 4 milliGRAM(s) Oral two times a day  rivaroxaban 10 milliGRAM(s) Oral <User Schedule>  tamsulosin 0.4 milliGRAM(s) Oral at bedtime    MEDICATIONS  (PRN):  acetaminophen   Tablet .. 650 milliGRAM(s) Oral every 6 hours PRN Temp greater or equal to 38C (100.4F), Moderate Pain (4 - 6)  dextrose 40% Gel 15 Gram(s) Oral once PRN Blood Glucose LESS THAN 70 milliGRAM(s)/deciliter  glucagon  Injectable 1 milliGRAM(s) IntraMuscular once PRN Glucose LESS THAN 70 milligrams/deciliter      ALLERGIES:  Allergies    No Known Allergies    Intolerances        LABS:                        13.2   9.85  )-----------( 125      ( 26 Oct 2020 05:59 )             41.3     10-26    143  |  101  |  24<H>  ----------------------------<  287<H>  3.8   |  30  |  0.75    Ca    8.8      26 Oct 2020 05:59  Phos  3.5     10-26  Mg     2.0     10-26    TPro  6.3  /  Alb  3.2<L>  /  TBili  0.8  /  DBili  x   /  AST  32  /  ALT  36  /  AlkPhos  125<H>  10-26    PTT - ( 26 Oct 2020 05:59 )  PTT:35.2 sec    CAPILLARY BLOOD GLUCOSE      POCT Blood Glucose.: 209 mg/dL (26 Oct 2020 12:26)      RADIOLOGY & ADDITIONAL TESTS: Reviewed.

## 2020-10-26 NOTE — PROGRESS NOTE ADULT - PROBLEM SELECTOR PLAN 9
prior history of seizure disorder, last seizure was 1 year, patient would have difficulty articulate, but cannot speak. Dr. Rizzo ([facility], [place])  - c/w levetiracetam 750 mg PO BID
on weekly methotrexate 7.5 mg (on Fridays), and a prednisone taper (previously on 5 mg BID, now on 4 mg BID for 4 weeks)  - c/w prednisone 4mg PO BID

## 2020-10-26 NOTE — GOALS OF CARE CONVERSATION - ADVANCED CARE PLANNING - WHAT MATTERS MOST
-communication between pt's primary specialists at [facility] and [facility] teams  -that pt is not "tortured"

## 2020-10-26 NOTE — CONSULT NOTE ADULT - PROBLEM SELECTOR RECOMMENDATION 9
progressive in recent months. per pt's wife, pt regularly coughing at meals  -[facility] at [place] 7/2020, recommending unrestricted diet  -S+S: recommending nectar thick, full liquid diet  -Per GOC 10/25, peg tube is NOT in line with GOC progressive in recent months. per pt's wife, pt regularly coughing during all meals  Pt sp S+S Eval at [place] 7/2020, recommended unrestricted diet  -sp barium study cw aspiration   -sp S+S eval: recommending nectar thick, full liquid diet, spoon fed only  -[facility] 10/25, peg tube is NOT in line with GO

## 2020-10-26 NOTE — CONSULT NOTE ADULT - PROBLEM SELECTOR RECOMMENDATION 4
As discussed during the palliative IDT meeting and as identified during the patients PSSA screening the patient would benefit from: ongoing palliative care support  Support provided to patient and family. Patient to have access to supportive services during rest of hospital stay as the patient/family deemed necessary ie. Chaplaincy, Massage therapy, Music therapy, Patient and family supportive services, Palliative SW, etc.

## 2020-10-26 NOTE — PROGRESS NOTE ADULT - SUBJECTIVE AND OBJECTIVE BOX
INTERVAL HPI/OVERNIGHT EVENTS:    Patient is a 89y old  Male who presents with a chief complaint of altered mental status (26 Oct 2020 14:26)  Pt was seen and examined at the bedside. pt required basal/boulse over the weekend due to hyperglycemia currently on Lantus 10 and lispro 12 units TID.   good appetite for lunch.    on prednisone 4 mg BID.     FSG & Insulin received:    Yesterday:  pre-dinner fs  nutritional lispro  8 units +  2 units lispro SS  bedtime fs  lantus  10 units +6    units lispro SS    Today:  pre-breakfast fs  6    units lispro SS, pt was NPO   pre-lunch fs  nutritional lispro 12  units+ 4  units lispro SS      Pt reports the following symptoms:    CONSTITUTIONAL:  Negative fever or chills, feels well  CARDIOVASCULAR:  Negative for chest pain or palpitations  RESPIRATORY:  Negative for cough, wheezing, or SOB   GASTROINTESTINAL:  Negative for nausea, vomiting, diarrhea, constipation, or abdominal pain  GENITOURINARY:  Negative frequency, urgency or dysuria  NEUROLOGIC:  No headache, confusion, dizziness, lightheadedness    MEDICATIONS  (STANDING):  acetaZOLAMIDE    Tablet 250 milliGRAM(s) Oral every 12 hours  cefTRIAXone   IVPB 2000 milliGRAM(s) IV Intermittent every 24 hours  dextrose 5%. 1000 milliLiter(s) (50 mL/Hr) IV Continuous <Continuous>  dextrose 50% Injectable 12.5 Gram(s) IV Push once  dextrose 50% Injectable 25 Gram(s) IV Push once  dextrose 50% Injectable 25 Gram(s) IV Push once  finasteride 5 milliGRAM(s) Oral daily  folic acid 1 milliGRAM(s) Oral daily  furosemide   Injectable 40 milliGRAM(s) IV Push every 12 hours  insulin glargine Injectable (LANTUS) 10 Unit(s) SubCutaneous at bedtime  insulin lispro (ADMELOG) corrective regimen sliding scale   SubCutaneous Before meals and at bedtime  insulin lispro Injectable (ADMELOG) 12 Unit(s) SubCutaneous three times a day before meals  levETIRAcetam 750 milliGRAM(s) Oral two times a day  metoprolol tartrate 12.5 milliGRAM(s) Oral two times a day  multivitamin  Chewable 1 Tablet(s) Oral daily  predniSONE   Tablet 4 milliGRAM(s) Oral two times a day  rivaroxaban 10 milliGRAM(s) Oral <User Schedule>  tamsulosin 0.4 milliGRAM(s) Oral at bedtime    MEDICATIONS  (PRN):  acetaminophen   Tablet .. 650 milliGRAM(s) Oral every 6 hours PRN Temp greater or equal to 38C (100.4F), Moderate Pain (4 - 6)  dextrose 40% Gel 15 Gram(s) Oral once PRN Blood Glucose LESS THAN 70 milliGRAM(s)/deciliter  glucagon  Injectable 1 milliGRAM(s) IntraMuscular once PRN Glucose LESS THAN 70 milligrams/deciliter      Past medical history reviewed  Family history reviewed  Social history reviewed    PHYSICAL EXAM  Vital Signs Last 24 Hrs  T(C): 36.7 (26 Oct 2020 09:01), Max: 37.2 (25 Oct 2020 23:30)  T(F): 98.1 (26 Oct 2020 09:), Max: 99 (25 Oct 2020 23:30)  HR: 96 (26 Oct 2020 09:) (77 - 99)  BP: 104/73 (26 Oct 2020 09:) (104/73 - 134/87)  BP(mean): --  RR: 21 (26 Oct 2020 09:01) (20 - 22)  SpO2: 96% (26 Oct 2020 09:01) (96% - 100%)    Constitutional: wn/wd in NAD.   HEENT:  no proptosis or lid retraction  Neck: no thyromegaly or palpable thyroid nodules   Respiratory: lungs CTAB.  Cardiovascular: regular rhythm, normal S1 and S2  GI: soft, NT/ND  Neurology: no tremors, DTR 2  Psychiatric: AAO x 3, normal affect/mood.    LABS:                        13.2   9.85  )-----------( 125      ( 26 Oct 2020 05:59 )             41.3     10-26    143  |  101  |  24<H>  ----------------------------<  287<H>  3.8   |  30  |  0.75    Ca    8.8      26 Oct 2020 05:59  Phos  3.5     10-26  Mg     2.0     10-26    TPro  6.3  /  Alb  3.2<L>  /  TBili  0.8  /  DBili  x   /  AST  32  /  ALT  36  /  AlkPhos  125<H>  10-26    PTT - ( 26 Oct 2020 05:59 )  PTT:35.2 sec        HbA1C: 11.7 % (10-23 @ 00:20)      CAPILLARY BLOOD GLUCOSE      POCT Blood Glucose.: 209 mg/dL (26 Oct 2020 12:26)  POCT Blood Glucose.: 265 mg/dL (26 Oct 2020 08:57)  POCT Blood Glucose.: 256 mg/dL (25 Oct 2020 21:52)  POCT Blood Glucose.: 176 mg/dL (25 Oct 2020 17:35)      Cholesterol, Serum: 115 mg/dL (10-25-20 @ 07:03)  HDL Cholesterol, Serum: 51 mg/dL (10-25-20 @ 07:03)  Triglycerides, Serum: 86 mg/dL (10-25-20 @ 07:03)    A/P:89M with atrial fibrillation (on Xarelto 10 mg daily), spinal stenosis, bullous pemphigoid, b/l LE insufficiency w/ ulceration, DM, history of seizures, CVA, presents with altered mental status and fever being treated for CAP and possible LES cellulitis, also with hyperglycemia.     1.  DM - steroid/infection induced hyperglycemia.   A1C: 11.7%  Weight: 90kg BMI 30  Cr/GRF: 0.92/70  EF: 49%, severe AS, mod MR  lantus  at bedtime  lispro  units tid with meals.   Continue consistent carb diet   Please continue lispro moderate dose sliding scale four times daily with meals and at bedtime    Pt's fingerstick glucose goal is 100-200.      2.  Hyperlipidemia - goal LDL < 70  check lipids    3.  Obesity - outpatient medical management.    consider GLP-1 agonist.     Will continue to monitor     For discharge, pt can continue    Pt can follow up at discharge with private physician.      case seen and discussed with Dr. Aguirre  and update primary team         INTERVAL HPI/OVERNIGHT EVENTS:    Patient is a 89y old  Male who presents with a chief complaint of altered mental status (26 Oct 2020 14:26)  Pt was seen and examined at the bedside. pt required basal/boulse over the weekend due to hyperglycemia currently on Lantus 10 and lispro 12 units TID.   good appetite for lunch.    on prednisone 4 mg BID.     FSG & Insulin received:    Yesterday:  pre-dinner fs  nutritional lispro  8 units +  2 units lispro SS  bedtime fs  lantus  10 units +6    units lispro SS    Today:  pre-breakfast fs  6    units lispro SS, pt was NPO   pre-lunch fs  nutritional lispro 12  units+ 4  units lispro SS      Pt reports the following symptoms:    CONSTITUTIONAL:  Negative fever or chills, feels well  CARDIOVASCULAR:  Negative for chest pain or palpitations  RESPIRATORY:  Negative for cough, wheezing, or SOB   GASTROINTESTINAL:  Negative for nausea, vomiting, diarrhea, constipation, or abdominal pain  GENITOURINARY:  Negative frequency, urgency or dysuria  NEUROLOGIC:  No headache, confusion, dizziness, lightheadedness    MEDICATIONS  (STANDING):  acetaZOLAMIDE    Tablet 250 milliGRAM(s) Oral every 12 hours  cefTRIAXone   IVPB 2000 milliGRAM(s) IV Intermittent every 24 hours  dextrose 5%. 1000 milliLiter(s) (50 mL/Hr) IV Continuous <Continuous>  dextrose 50% Injectable 12.5 Gram(s) IV Push once  dextrose 50% Injectable 25 Gram(s) IV Push once  dextrose 50% Injectable 25 Gram(s) IV Push once  finasteride 5 milliGRAM(s) Oral daily  folic acid 1 milliGRAM(s) Oral daily  furosemide   Injectable 40 milliGRAM(s) IV Push every 12 hours  insulin glargine Injectable (LANTUS) 10 Unit(s) SubCutaneous at bedtime  insulin lispro (ADMELOG) corrective regimen sliding scale   SubCutaneous Before meals and at bedtime  insulin lispro Injectable (ADMELOG) 12 Unit(s) SubCutaneous three times a day before meals  levETIRAcetam 750 milliGRAM(s) Oral two times a day  metoprolol tartrate 12.5 milliGRAM(s) Oral two times a day  multivitamin  Chewable 1 Tablet(s) Oral daily  predniSONE   Tablet 4 milliGRAM(s) Oral two times a day  rivaroxaban 10 milliGRAM(s) Oral <User Schedule>  tamsulosin 0.4 milliGRAM(s) Oral at bedtime    MEDICATIONS  (PRN):  acetaminophen   Tablet .. 650 milliGRAM(s) Oral every 6 hours PRN Temp greater or equal to 38C (100.4F), Moderate Pain (4 - 6)  dextrose 40% Gel 15 Gram(s) Oral once PRN Blood Glucose LESS THAN 70 milliGRAM(s)/deciliter  glucagon  Injectable 1 milliGRAM(s) IntraMuscular once PRN Glucose LESS THAN 70 milligrams/deciliter      Past medical history reviewed  Family history reviewed  Social history reviewed    PHYSICAL EXAM  Vital Signs Last 24 Hrs  T(C): 36.7 (26 Oct 2020 09:01), Max: 37.2 (25 Oct 2020 23:30)  T(F): 98.1 (26 Oct 2020 09:), Max: 99 (25 Oct 2020 23:30)  HR: 96 (26 Oct 2020 09:) (77 - 99)  BP: 104/73 (26 Oct 2020 09:) (104/73 - 134/87)  BP(mean): --  RR: 21 (26 Oct 2020 09:01) (20 - 22)  SpO2: 96% (26 Oct 2020 09:01) (96% - 100%)    Constitutional: wn/wd in NAD.   HEENT:  no proptosis or lid retraction  Neck: no thyromegaly or palpable thyroid nodules   Respiratory: lungs CTAB.  Cardiovascular: regular rhythm, normal S1 and S2  GI: soft, NT/ND  Neurology: no tremors, DTR 2  Psychiatric: AAO x 3, normal affect/mood.    LABS:                        13.2   9.85  )-----------( 125      ( 26 Oct 2020 05:59 )             41.3     10-26    143  |  101  |  24<H>  ----------------------------<  287<H>  3.8   |  30  |  0.75    Ca    8.8      26 Oct 2020 05:59  Phos  3.5     10-26  Mg     2.0     10-26    TPro  6.3  /  Alb  3.2<L>  /  TBili  0.8  /  DBili  x   /  AST  32  /  ALT  36  /  AlkPhos  125<H>  10-26    PTT - ( 26 Oct 2020 05:59 )  PTT:35.2 sec        HbA1C: 11.7 % (10-23 @ 00:20)      CAPILLARY BLOOD GLUCOSE      POCT Blood Glucose.: 209 mg/dL (26 Oct 2020 12:26)  POCT Blood Glucose.: 265 mg/dL (26 Oct 2020 08:57)  POCT Blood Glucose.: 256 mg/dL (25 Oct 2020 21:52)  POCT Blood Glucose.: 176 mg/dL (25 Oct 2020 17:35)      Cholesterol, Serum: 115 mg/dL (10-25-20 @ 07:03)  HDL Cholesterol, Serum: 51 mg/dL (10-25-20 @ 07:03)  Triglycerides, Serum: 86 mg/dL (10-25-20 @ 07:03)    A/P:89M with atrial fibrillation (on Xarelto 10 mg daily), spinal stenosis, bullous pemphigoid, b/l LE insufficiency w/ ulceration, DM, history of seizures, CVA, presents with altered mental status and fever being treated for CAP and possible LES cellulitis, also with hyperglycemia.     1.  DM - steroid/infection induced hyperglycemia.   A1C: 11.7%  Weight: 90kg BMI 30  Cr/GRF: 0.92/70  EF: 49%, severe AS, mod MR  please increase lantus 15  at bedtime  C/w lispro 12 units tid with meals.   Continue consistent carb diet   Please continue lispro moderate dose sliding scale four times daily with meals and at bedtime    Pt's fingerstick glucose goal is 100-200.      2.  Hyperlipidemia - goal LDL < 70  check lipids    3.  Obesity - outpatient medical management.    consider GLP-1 agonist.     Will continue to monitor     For discharge, pt can continue    Pt can follow up at discharge with private physician.      case seen and discussed with Dr. Aguirre  and update primary team

## 2020-10-27 ENCOUNTER — TRANSCRIPTION ENCOUNTER (OUTPATIENT)
Age: 85
End: 2020-10-27

## 2020-10-27 VITALS
OXYGEN SATURATION: 96 % | SYSTOLIC BLOOD PRESSURE: 104 MMHG | RESPIRATION RATE: 20 BRPM | HEART RATE: 95 BPM | TEMPERATURE: 97 F | DIASTOLIC BLOOD PRESSURE: 70 MMHG

## 2020-10-27 DIAGNOSIS — J96.00 ACUTE RESPIRATORY FAILURE, UNSPECIFIED WHETHER WITH HYPOXIA OR HYPERCAPNIA: ICD-10-CM

## 2020-10-27 LAB
ANION GAP SERPL CALC-SCNC: 11 MMOL/L — SIGNIFICANT CHANGE UP (ref 5–17)
BASOPHILS # BLD AUTO: 0.03 K/UL — SIGNIFICANT CHANGE UP (ref 0–0.2)
BASOPHILS NFR BLD AUTO: 0.4 % — SIGNIFICANT CHANGE UP (ref 0–2)
BUN SERPL-MCNC: 28 MG/DL — HIGH (ref 7–23)
CALCIUM SERPL-MCNC: 9 MG/DL — SIGNIFICANT CHANGE UP (ref 8.4–10.5)
CHLORIDE SERPL-SCNC: 100 MMOL/L — SIGNIFICANT CHANGE UP (ref 96–108)
CO2 SERPL-SCNC: 31 MMOL/L — SIGNIFICANT CHANGE UP (ref 22–31)
CREAT SERPL-MCNC: 0.76 MG/DL — SIGNIFICANT CHANGE UP (ref 0.5–1.3)
EOSINOPHIL # BLD AUTO: 0.22 K/UL — SIGNIFICANT CHANGE UP (ref 0–0.5)
EOSINOPHIL NFR BLD AUTO: 2.6 % — SIGNIFICANT CHANGE UP (ref 0–6)
GLUCOSE BLDC GLUCOMTR-MCNC: 325 MG/DL — HIGH (ref 70–99)
GLUCOSE BLDC GLUCOMTR-MCNC: 363 MG/DL — HIGH (ref 70–99)
GLUCOSE SERPL-MCNC: 358 MG/DL — HIGH (ref 70–99)
HCT VFR BLD CALC: 41.5 % — SIGNIFICANT CHANGE UP (ref 39–50)
HGB BLD-MCNC: 13.1 G/DL — SIGNIFICANT CHANGE UP (ref 13–17)
IMM GRANULOCYTES NFR BLD AUTO: 1.2 % — SIGNIFICANT CHANGE UP (ref 0–1.5)
LYMPHOCYTES # BLD AUTO: 0.86 K/UL — LOW (ref 1–3.3)
LYMPHOCYTES # BLD AUTO: 10.3 % — LOW (ref 13–44)
MAGNESIUM SERPL-MCNC: 2.2 MG/DL — SIGNIFICANT CHANGE UP (ref 1.6–2.6)
MCHC RBC-ENTMCNC: 31.6 GM/DL — LOW (ref 32–36)
MCHC RBC-ENTMCNC: 33.7 PG — SIGNIFICANT CHANGE UP (ref 27–34)
MCV RBC AUTO: 106.7 FL — HIGH (ref 80–100)
MONOCYTES # BLD AUTO: 0.52 K/UL — SIGNIFICANT CHANGE UP (ref 0–0.9)
MONOCYTES NFR BLD AUTO: 6.2 % — SIGNIFICANT CHANGE UP (ref 2–14)
NEUTROPHILS # BLD AUTO: 6.63 K/UL — SIGNIFICANT CHANGE UP (ref 1.8–7.4)
NEUTROPHILS NFR BLD AUTO: 79.3 % — HIGH (ref 43–77)
NRBC # BLD: 0 /100 WBCS — SIGNIFICANT CHANGE UP (ref 0–0)
PLATELET # BLD AUTO: 139 K/UL — LOW (ref 150–400)
POTASSIUM SERPL-MCNC: 3.8 MMOL/L — SIGNIFICANT CHANGE UP (ref 3.5–5.3)
POTASSIUM SERPL-SCNC: 3.8 MMOL/L — SIGNIFICANT CHANGE UP (ref 3.5–5.3)
RBC # BLD: 3.89 M/UL — LOW (ref 4.2–5.8)
RBC # FLD: 14.1 % — SIGNIFICANT CHANGE UP (ref 10.3–14.5)
SARS-COV-2 RNA SPEC QL NAA+PROBE: NEGATIVE — SIGNIFICANT CHANGE UP
SODIUM SERPL-SCNC: 142 MMOL/L — SIGNIFICANT CHANGE UP (ref 135–145)
WBC # BLD: 8.36 K/UL — SIGNIFICANT CHANGE UP (ref 3.8–10.5)
WBC # FLD AUTO: 8.36 K/UL — SIGNIFICANT CHANGE UP (ref 3.8–10.5)

## 2020-10-27 PROCEDURE — 82962 GLUCOSE BLOOD TEST: CPT

## 2020-10-27 PROCEDURE — 99233 SBSQ HOSP IP/OBS HIGH 50: CPT | Mod: GC

## 2020-10-27 PROCEDURE — 93306 TTE W/DOPPLER COMPLETE: CPT

## 2020-10-27 PROCEDURE — 82803 BLOOD GASES ANY COMBINATION: CPT

## 2020-10-27 PROCEDURE — 87150 DNA/RNA AMPLIFIED PROBE: CPT

## 2020-10-27 PROCEDURE — 80061 LIPID PANEL: CPT

## 2020-10-27 PROCEDURE — 87086 URINE CULTURE/COLONY COUNT: CPT

## 2020-10-27 PROCEDURE — 84295 ASSAY OF SERUM SODIUM: CPT

## 2020-10-27 PROCEDURE — 80048 BASIC METABOLIC PNL TOTAL CA: CPT

## 2020-10-27 PROCEDURE — 97161 PT EVAL LOW COMPLEX 20 MIN: CPT

## 2020-10-27 PROCEDURE — 92610 EVALUATE SWALLOWING FUNCTION: CPT

## 2020-10-27 PROCEDURE — 85610 PROTHROMBIN TIME: CPT

## 2020-10-27 PROCEDURE — 84100 ASSAY OF PHOSPHORUS: CPT

## 2020-10-27 PROCEDURE — 96375 TX/PRO/DX INJ NEW DRUG ADDON: CPT

## 2020-10-27 PROCEDURE — 71045 X-RAY EXAM CHEST 1 VIEW: CPT

## 2020-10-27 PROCEDURE — 70450 CT HEAD/BRAIN W/O DYE: CPT

## 2020-10-27 PROCEDURE — 74230 X-RAY XM SWLNG FUNCJ C+: CPT

## 2020-10-27 PROCEDURE — 97116 GAIT TRAINING THERAPY: CPT

## 2020-10-27 PROCEDURE — 83036 HEMOGLOBIN GLYCOSYLATED A1C: CPT

## 2020-10-27 PROCEDURE — 87635 SARS-COV-2 COVID-19 AMP PRB: CPT

## 2020-10-27 PROCEDURE — 87040 BLOOD CULTURE FOR BACTERIA: CPT

## 2020-10-27 PROCEDURE — 84484 ASSAY OF TROPONIN QUANT: CPT

## 2020-10-27 PROCEDURE — 87184 SC STD DISK METHOD PER PLATE: CPT

## 2020-10-27 PROCEDURE — 85027 COMPLETE CBC AUTOMATED: CPT

## 2020-10-27 PROCEDURE — 99285 EMERGENCY DEPT VISIT HI MDM: CPT | Mod: 25

## 2020-10-27 PROCEDURE — 36415 COLL VENOUS BLD VENIPUNCTURE: CPT

## 2020-10-27 PROCEDURE — 99233 SBSQ HOSP IP/OBS HIGH 50: CPT

## 2020-10-27 PROCEDURE — 84145 PROCALCITONIN (PCT): CPT

## 2020-10-27 PROCEDURE — 93005 ELECTROCARDIOGRAM TRACING: CPT

## 2020-10-27 PROCEDURE — 82010 KETONE BODYS QUAN: CPT

## 2020-10-27 PROCEDURE — 96374 THER/PROPH/DIAG INJ IV PUSH: CPT

## 2020-10-27 PROCEDURE — 87449 NOS EACH ORGANISM AG IA: CPT

## 2020-10-27 PROCEDURE — 92611 MOTION FLUOROSCOPY/SWALLOW: CPT

## 2020-10-27 PROCEDURE — 87631 RESP VIRUS 3-5 TARGETS: CPT

## 2020-10-27 PROCEDURE — 97110 THERAPEUTIC EXERCISES: CPT

## 2020-10-27 PROCEDURE — 82330 ASSAY OF CALCIUM: CPT

## 2020-10-27 PROCEDURE — 85730 THROMBOPLASTIN TIME PARTIAL: CPT

## 2020-10-27 PROCEDURE — 85025 COMPLETE CBC W/AUTO DIFF WBC: CPT

## 2020-10-27 PROCEDURE — 83880 ASSAY OF NATRIURETIC PEPTIDE: CPT

## 2020-10-27 PROCEDURE — 80053 COMPREHEN METABOLIC PANEL: CPT

## 2020-10-27 PROCEDURE — 81001 URINALYSIS AUTO W/SCOPE: CPT

## 2020-10-27 PROCEDURE — 83735 ASSAY OF MAGNESIUM: CPT

## 2020-10-27 PROCEDURE — 84132 ASSAY OF SERUM POTASSIUM: CPT

## 2020-10-27 PROCEDURE — 83605 ASSAY OF LACTIC ACID: CPT

## 2020-10-27 RX ORDER — METOPROLOL TARTRATE 50 MG
1 TABLET ORAL
Qty: 0 | Refills: 0 | DISCHARGE

## 2020-10-27 RX ORDER — POLYETHYLENE GLYCOL 3350 17 G/17G
17 POWDER, FOR SOLUTION ORAL
Qty: 0 | Refills: 0 | DISCHARGE
Start: 2020-10-27

## 2020-10-27 RX ORDER — FUROSEMIDE 40 MG
20 TABLET ORAL
Qty: 0 | Refills: 0 | DISCHARGE

## 2020-10-27 RX ORDER — METHOTREXATE 2.5 MG/1
7.5 TABLET ORAL
Qty: 0 | Refills: 0 | DISCHARGE

## 2020-10-27 RX ORDER — POLYETHYLENE GLYCOL 3350 17 G/17G
17 POWDER, FOR SOLUTION ORAL DAILY
Refills: 0 | Status: DISCONTINUED | OUTPATIENT
Start: 2020-10-27 | End: 2020-10-27

## 2020-10-27 RX ORDER — POTASSIUM CHLORIDE 20 MEQ
20 PACKET (EA) ORAL ONCE
Refills: 0 | Status: COMPLETED | OUTPATIENT
Start: 2020-10-27 | End: 2020-10-27

## 2020-10-27 RX ORDER — HUMAN INSULIN 100 [IU]/ML
10 INJECTION, SUSPENSION SUBCUTANEOUS ONCE
Refills: 0 | Status: COMPLETED | OUTPATIENT
Start: 2020-10-27 | End: 2020-10-27

## 2020-10-27 RX ORDER — CEFTRIAXONE 500 MG/1
2 INJECTION, POWDER, FOR SOLUTION INTRAMUSCULAR; INTRAVENOUS
Qty: 0 | Refills: 0 | DISCHARGE
Start: 2020-10-27 | End: 2020-11-05

## 2020-10-27 RX ORDER — FUROSEMIDE 40 MG
40 TABLET ORAL EVERY 12 HOURS
Refills: 0 | Status: DISCONTINUED | OUTPATIENT
Start: 2020-10-27 | End: 2020-10-27

## 2020-10-27 RX ORDER — INSULIN GLARGINE 100 [IU]/ML
20 INJECTION, SOLUTION SUBCUTANEOUS
Qty: 0 | Refills: 0 | DISCHARGE
Start: 2020-10-27

## 2020-10-27 RX ORDER — SENNA PLUS 8.6 MG/1
2 TABLET ORAL
Qty: 0 | Refills: 0 | DISCHARGE
Start: 2020-10-27

## 2020-10-27 RX ORDER — INSULIN LISPRO 100/ML
15 VIAL (ML) SUBCUTANEOUS
Qty: 0 | Refills: 0 | DISCHARGE
Start: 2020-10-27

## 2020-10-27 RX ORDER — ACETAZOLAMIDE 250 MG/1
1 TABLET ORAL
Qty: 0 | Refills: 0 | DISCHARGE
Start: 2020-10-27

## 2020-10-27 RX ORDER — FUROSEMIDE 40 MG
40 TABLET ORAL
Qty: 0 | Refills: 0 | DISCHARGE
Start: 2020-10-27

## 2020-10-27 RX ORDER — SENNA PLUS 8.6 MG/1
2 TABLET ORAL AT BEDTIME
Refills: 0 | Status: DISCONTINUED | OUTPATIENT
Start: 2020-10-27 | End: 2020-10-27

## 2020-10-27 RX ORDER — ACETAZOLAMIDE 250 MG/1
250 TABLET ORAL EVERY 12 HOURS
Refills: 0 | Status: DISCONTINUED | OUTPATIENT
Start: 2020-10-27 | End: 2020-10-27

## 2020-10-27 RX ORDER — FUROSEMIDE 40 MG
40 TABLET ORAL
Qty: 0 | Refills: 0 | DISCHARGE

## 2020-10-27 RX ADMIN — Medication 4 MILLIGRAM(S): at 06:27

## 2020-10-27 RX ADMIN — LEVETIRACETAM 750 MILLIGRAM(S): 250 TABLET, FILM COATED ORAL at 06:28

## 2020-10-27 RX ADMIN — Medication 20 MILLIEQUIVALENT(S): at 09:25

## 2020-10-27 RX ADMIN — Medication 12 UNIT(S): at 14:50

## 2020-10-27 RX ADMIN — ACETAZOLAMIDE 250 MILLIGRAM(S): 250 TABLET ORAL at 11:50

## 2020-10-27 RX ADMIN — POLYETHYLENE GLYCOL 3350 17 GRAM(S): 17 POWDER, FOR SOLUTION ORAL at 11:14

## 2020-10-27 RX ADMIN — Medication 12 UNIT(S): at 09:25

## 2020-10-27 RX ADMIN — HUMAN INSULIN 10 UNIT(S): 100 INJECTION, SUSPENSION SUBCUTANEOUS at 12:33

## 2020-10-27 RX ADMIN — FINASTERIDE 5 MILLIGRAM(S): 5 TABLET, FILM COATED ORAL at 11:14

## 2020-10-27 RX ADMIN — Medication 12.5 MILLIGRAM(S): at 06:27

## 2020-10-27 RX ADMIN — Medication 8: at 09:25

## 2020-10-27 RX ADMIN — CEFTRIAXONE 100 MILLIGRAM(S): 500 INJECTION, POWDER, FOR SOLUTION INTRAMUSCULAR; INTRAVENOUS at 11:14

## 2020-10-27 RX ADMIN — Medication 40 MILLIGRAM(S): at 11:26

## 2020-10-27 RX ADMIN — Medication 10: at 14:49

## 2020-10-27 RX ADMIN — RIVAROXABAN 10 MILLIGRAM(S): KIT at 11:14

## 2020-10-27 RX ADMIN — Medication 1 TABLET(S): at 11:14

## 2020-10-27 RX ADMIN — Medication 1 MILLIGRAM(S): at 11:14

## 2020-10-27 NOTE — DISCHARGE NOTE PROVIDER - NSDCFUADDAPPT_GEN_ALL_CORE_FT
Please follow up with your primary care provider. Dr. Eugenia Siegler will not be available at time of follow up, but she will be covered by Dr. Mary Lainez at [address] . Please follow up with your primary care provider. Dr. Eugenia Siegler will not be available at time of follow up, but she will be covered by Dr. Mary Lainez at [address]    Dr. Duarte's office will reach out to you via telephone to schedule a follow-up appointment after discharge from rehab. Please follow up with your primary care provider. Dr. Eugenia Siegler will not be available at time of follow up, but she will be covered by Dr. Mary Lainez at [address] on 11/6 at 1:00PM    Dr. Duarte's office will reach out to you via telephone to schedule a follow-up appointment after discharge from rehab. If you do not hear from them within 1 week, please call their office to schedule an appointment. Please follow up with your primary care provider. Dr. Eugenia Siegler will not be available at time of follow up, but she will be covered by Dr. Mary Lainez at [address] on 11/6 at 1:00PM    Dr. Duarte's office will reach out to you via telephone to schedule a follow-up appointment after discharge from rehab. If you do not hear from them within 1 week, please call their office to schedule an appointment.    Please have your primary care provider refer you to an endocrinologist to manage your diabetes. If you cannot obtain one, you can call endocrinology at [facility] at [phone]

## 2020-10-27 NOTE — DISCHARGE NOTE NURSING/CASE MANAGEMENT/SOCIAL WORK - NSDCFUADDAPPT_GEN_ALL_CORE_FT
Please follow up with your primary care provider. Dr. Eugenia Siegler will not be available at time of follow up, but she will be covered by Dr. Mary Lainez at [address] on 11/6 at 1:00PM    Dr. Duarte's office will reach out to you via telephone to schedule a follow-up appointment after discharge from rehab. If you do not hear from them within 1 week, please call their office to schedule an appointment.    Please have your primary care provider refer you to an endocrinologist to manage your diabetes. If you cannot obtain one, you can call endocrinology at [facility] at [phone]

## 2020-10-27 NOTE — PROGRESS NOTE ADULT - SUBJECTIVE AND OBJECTIVE BOX
INTERVAL HPI/OVERNIGHT EVENTS:  Patient is a 89y old  Male who presents with a chief complaint of altered mental status (26 Oct 2020 14:26)  Pt was seen and examined at the bedside. pt required basal/boulse over the weekend due to hyperglycemia currently on Lantus 10 and lispro 12 units TID.   good appetite for lunch.    on prednisone 4 mg BID.     FSG & Insulin received:    Yesterday:  pre-dinner fs  nutritional lispro  8 units +  2 units lispro SS  bedtime fs  lantus  10 units +6    units lispro SS    Today:  pre-breakfast fs  6    units lispro SS, pt was NPO   pre-lunch fs  nutritional lispro 12  units+ 4  units lispro SS      Pt reports the following symptoms:    CONSTITUTIONAL:  Negative fever or chills, feels well  CARDIOVASCULAR:  Negative for chest pain or palpitations  RESPIRATORY:  Negative for cough, wheezing, or SOB   GASTROINTESTINAL:  Negative for nausea, vomiting, diarrhea, constipation, or abdominal pain  GENITOURINARY:  Negative frequency, urgency or dysuria  NEUROLOGIC:  No headache, confusion, dizziness,   MEDICATIONS  (STANDING):  acetaZOLAMIDE    Tablet 250 milliGRAM(s) Oral every 12 hours  cefTRIAXone   IVPB 2000 milliGRAM(s) IV Intermittent every 24 hours  dextrose 5%. 1000 milliLiter(s) (50 mL/Hr) IV Continuous <Continuous>  dextrose 50% Injectable 12.5 Gram(s) IV Push once  dextrose 50% Injectable 25 Gram(s) IV Push once  dextrose 50% Injectable 25 Gram(s) IV Push once  finasteride 5 milliGRAM(s) Oral daily  folic acid 1 milliGRAM(s) Oral daily  furosemide   Injectable 40 milliGRAM(s) IV Push every 12 hours  insulin glargine Injectable (LANTUS) 15 Unit(s) SubCutaneous at bedtime  insulin lispro (ADMELOG) corrective regimen sliding scale   SubCutaneous Before meals and at bedtime  insulin lispro Injectable (ADMELOG) 12 Unit(s) SubCutaneous three times a day before meals  insulin NPH human recombinant 10 Unit(s) SubCutaneous once  levETIRAcetam 750 milliGRAM(s) Oral two times a day  metoprolol tartrate 12.5 milliGRAM(s) Oral two times a day  multivitamin  Chewable 1 Tablet(s) Oral daily  polyethylene glycol 3350 17 Gram(s) Oral daily  predniSONE   Tablet 4 milliGRAM(s) Oral two times a day  rivaroxaban 10 milliGRAM(s) Oral <User Schedule>  senna 2 Tablet(s) Oral at bedtime  tamsulosin 0.4 milliGRAM(s) Oral at bedtime    MEDICATIONS  (PRN):  acetaminophen   Tablet .. 650 milliGRAM(s) Oral every 6 hours PRN Temp greater or equal to 38C (100.4F), Moderate Pain (4 - 6)  dextrose 40% Gel 15 Gram(s) Oral once PRN Blood Glucose LESS THAN 70 milliGRAM(s)/deciliter  glucagon  Injectable 1 milliGRAM(s) IntraMuscular once PRN Glucose LESS THAN 70 milligrams/deciliter      PHYSICAL EXAM  Vital Signs Last 24 Hrs  T(C): 36.3 (27 Oct 2020 09:35), Max: 36.5 (26 Oct 2020 16:47)  T(F): 97.3 (27 Oct 2020 09:35), Max: 97.7 (26 Oct 2020 16:47)  HR: 95 (27 Oct 2020 09:35) (88 - 105)  BP: 104/70 (27 Oct 2020 09:35) (94/64 - 129/80)  BP(mean): --  RR: 20 (27 Oct 2020 09:35) (18 - 20)  SpO2: 96% (27 Oct 2020 09:35) (96% - 99%)    Constitutional: wn/wd in NAD.   HEENT:  no proptosis or lid retraction  Neck: no thyromegaly or palpable thyroid nodules   Respiratory: lungs CTAB.  Cardiovascular: regular rhythm, normal S1 and S2  GI: soft, NT/ND  Neurology: no tremors, DTR 2  Psychiatric: AAO x 3, normal affect/mood.    LABS:                        13.1   8.36  )-----------( 139      ( 27 Oct 2020 08:21 )             41.5     10-27    142  |  100  |  28<H>  ----------------------------<  358<H>  3.8   |  31  |  0.76    Ca    9.0      27 Oct 2020 08:21  Phos  3.5     10-26  Mg     2.2     10-27    TPro  6.3  /  Alb  3.2<L>  /  TBili  0.8  /  DBili  x   /  AST  32  /  ALT  36  /  AlkPhos  125<H>  10-26    PTT - ( 26 Oct 2020 05:59 )  PTT:35.2 sec        HbA1C:   CAPILLARY BLOOD GLUCOSE      POCT Blood Glucose.: 325 mg/dL (27 Oct 2020 09:03)  POCT Blood Glucose.: 161 mg/dL (26 Oct 2020 21:50)  POCT Blood Glucose.: 202 mg/dL (26 Oct 2020 17:45)  POCT Blood Glucose.: 209 mg/dL (26 Oct 2020 12:26)      A/P:89M with atrial fibrillation (on Xarelto 10 mg daily), spinal stenosis, bullous pemphigoid, b/l LE insufficiency w/ ulceration, DM, history of seizures, CVA, presents with altered mental status and fever being treated for CAP and possible LES cellulitis, also with hyperglycemia.     1.  DM - steroid/infection induced hyperglycemia.   A1C: 11.7%  Weight: 90kg BMI 30  Cr/GRF: 0.92/70  EF: 49%, severe AS, mod MR  please increase lantus 15  at bedtime  C/w lispro 12 units tid with meals.   Continue consistent carb diet   Please continue lispro moderate dose sliding scale four times daily with meals and at bedtime    Pt's fingerstick glucose goal is 100-200.      2.  Hyperlipidemia - goal LDL < 70  check lipids    3.  Obesity - outpatient medical management.    consider GLP-1 agonist.     Will continue to monitor     For discharge, pt can continue    Pt can follow up at discharge with private physician.      case seen and discussed with Dr. Aguirre  and update primary team       INTERVAL HPI/OVERNIGHT EVENTS:  Patient is a 89y old  Male who presents with a chief complaint of altered mental status (26 Oct 2020 14:26)  Pt was seen and examined at the bedside. Good appetite. Per RN, AM FSG was taken fasting. On prednisone 4 mg BID.     FSG & Insulin received:    Yesterday:  pre-dinner fs  nutritional lispro  12 units +  4 units lispro SS  bedtime fs  lantus  15 units +2   units lispro SS    Today:  pre-breakfast fs  nutritional lispro  12 units +  8 units lispro SS. NPH 10 units once  pre-lunch fsg:   nutritional lispro  units+   units lispro SS      Pt reports the following symptoms:    CONSTITUTIONAL:  Negative fever or chills, feels well  CARDIOVASCULAR:  Negative for chest pain or palpitations  RESPIRATORY:  Negative for cough, wheezing, or SOB   GASTROINTESTINAL:  Negative for nausea, vomiting, diarrhea, constipation, or abdominal pain  GENITOURINARY:  Negative frequency, urgency or dysuria  NEUROLOGIC:  No headache, confusion, dizziness,     MEDICATIONS  (STANDING):  acetaZOLAMIDE    Tablet 250 milliGRAM(s) Oral every 12 hours  cefTRIAXone   IVPB 2000 milliGRAM(s) IV Intermittent every 24 hours  dextrose 5%. 1000 milliLiter(s) (50 mL/Hr) IV Continuous <Continuous>  dextrose 50% Injectable 12.5 Gram(s) IV Push once  dextrose 50% Injectable 25 Gram(s) IV Push once  dextrose 50% Injectable 25 Gram(s) IV Push once  finasteride 5 milliGRAM(s) Oral daily  folic acid 1 milliGRAM(s) Oral daily  furosemide   Injectable 40 milliGRAM(s) IV Push every 12 hours  insulin glargine Injectable (LANTUS) 15 Unit(s) SubCutaneous at bedtime  insulin lispro (ADMELOG) corrective regimen sliding scale   SubCutaneous Before meals and at bedtime  insulin lispro Injectable (ADMELOG) 12 Unit(s) SubCutaneous three times a day before meals  insulin NPH human recombinant 10 Unit(s) SubCutaneous once  levETIRAcetam 750 milliGRAM(s) Oral two times a day  metoprolol tartrate 12.5 milliGRAM(s) Oral two times a day  multivitamin  Chewable 1 Tablet(s) Oral daily  polyethylene glycol 3350 17 Gram(s) Oral daily  predniSONE   Tablet 4 milliGRAM(s) Oral two times a day  rivaroxaban 10 milliGRAM(s) Oral <User Schedule>  senna 2 Tablet(s) Oral at bedtime  tamsulosin 0.4 milliGRAM(s) Oral at bedtime    MEDICATIONS  (PRN):  acetaminophen   Tablet .. 650 milliGRAM(s) Oral every 6 hours PRN Temp greater or equal to 38C (100.4F), Moderate Pain (4 - 6)  dextrose 40% Gel 15 Gram(s) Oral once PRN Blood Glucose LESS THAN 70 milliGRAM(s)/deciliter  glucagon  Injectable 1 milliGRAM(s) IntraMuscular once PRN Glucose LESS THAN 70 milligrams/deciliter      PHYSICAL EXAM  Vital Signs Last 24 Hrs  T(C): 36.3 (27 Oct 2020 09:35), Max: 36.5 (26 Oct 2020 16:47)  T(F): 97.3 (27 Oct 2020 09:35), Max: 97.7 (26 Oct 2020 16:47)  HR: 95 (27 Oct 2020 09:35) (88 - 105)  BP: 104/70 (27 Oct 2020 09:35) (94/64 - 129/80)  BP(mean): --  RR: 20 (27 Oct 2020 09:35) (18 - 20)  SpO2: 96% (27 Oct 2020 09:35) (96% - 99%)      Constitutional: wn/wd in NAD.   HEENT: NCAT, MMM, OP clear, EOMI, , no proptosis or lid retraction  Neck: no thyromegaly or palpable thyroid nodules   Respiratory: lungs CTAB.  Cardiovascular: regular rhythm, normal S1 and S2, RUSB III/VI murmur with radiation to carotids.  GI: soft, NT/ND, no masses/HSM appreciated.  Neurology: no tremors, DTR 2+  Skin: no visible rashes/lesions. no acanthosis nigricans. no hyperlipotrophy. no cushing's stigmata.  Psychiatric: AAO x 3, normal affect/mood.  Ext: radial pulses intact, DP pulses intact, extremities warm, no cyanosis, clubbing. + LES edema, discoloration, thin skin        LABS:                        13.1   8.36  )-----------( 139      ( 27 Oct 2020 08:21 )             41.5     10-27    142  |  100  |  28<H>  ----------------------------<  358<H>  3.8   |  31  |  0.76    Ca    9.0      27 Oct 2020 08:21  Phos  3.5     10  Mg     2.2     10    TPro  6.3  /  Alb  3.2<L>  /  TBili  0.8  /  DBili  x   /  AST  32  /  ALT  36  /  AlkPhos  125<H>  10-    PTT - ( 26 Oct 2020 05:59 )  PTT:35.2 sec        HbA1C:   CAPILLARY BLOOD GLUCOSE      POCT Blood Glucose.: 325 mg/dL (27 Oct 2020 09:03)  POCT Blood Glucose.: 161 mg/dL (26 Oct 2020 21:50)  POCT Blood Glucose.: 202 mg/dL (26 Oct 2020 17:45)  POCT Blood Glucose.: 209 mg/dL (26 Oct 2020 12:26)    Will discuss in rounds  A/P:89M with atrial fibrillation (on Xarelto 10 mg daily), spinal stenosis, bullous pemphigoid, b/l LE insufficiency w/ ulceration, DM, history of seizures, CVA, presents with altered mental status and fever being treated for CAP and possible LES cellulitis, also with hyperglycemia.     1.  DM - steroid/infection induced hyperglycemia.   A1C: 11.7%  Weight: 90kg BMI 30  Cr/GRF: 0.92/70  EF: 49%, severe AS, mod MR  please increase lantus   at bedtime  C/w lispro units tid with meals.   Continue consistent carb diet   Please continue lispro moderate dose sliding scale four times daily with meals and at bedtime    Pt's fingerstick glucose goal is 100-200.      2.  Hyperlipidemia - goal LDL < 70  check lipids    3.  Obesity - outpatient medical management.    consider GLP-1 agonist.     Will continue to monitor     For discharge, pt can continue    Pt can follow up at discharge with private physician.      case seen and discussed with Dr. Aguirre  and update primary team       INTERVAL HPI/OVERNIGHT EVENTS:  Patient is a 89y old  Male who presents with a chief complaint of altered mental status (26 Oct 2020 14:26)  Pt was seen and examined at the bedside. Good appetite. Per RN, AM FSG was taken fasting. On prednisone 4 mg BID.     FSG & Insulin received:    Yesterday:  pre-dinner fs  nutritional lispro  12 units +  4 units lispro SS  bedtime fs  lantus  15 units +2   units lispro SS    Today:  pre-breakfast fs  nutritional lispro  12 units +  8 units lispro SS. NPH 10 units once  pre-lunch fs  nutritional lispro  units 12+ 10   units lispro SS      Pt reports the following symptoms:    CONSTITUTIONAL:  Negative fever or chills, feels well  CARDIOVASCULAR:  Negative for chest pain or palpitations  RESPIRATORY:  Negative for cough, wheezing, or SOB   GASTROINTESTINAL:  Negative for nausea, vomiting, diarrhea, constipation, or abdominal pain  GENITOURINARY:  Negative frequency, urgency or dysuria  NEUROLOGIC:  No headache, confusion, dizziness,     MEDICATIONS  (STANDING):  acetaZOLAMIDE    Tablet 250 milliGRAM(s) Oral every 12 hours  cefTRIAXone   IVPB 2000 milliGRAM(s) IV Intermittent every 24 hours  dextrose 5%. 1000 milliLiter(s) (50 mL/Hr) IV Continuous <Continuous>  dextrose 50% Injectable 12.5 Gram(s) IV Push once  dextrose 50% Injectable 25 Gram(s) IV Push once  dextrose 50% Injectable 25 Gram(s) IV Push once  finasteride 5 milliGRAM(s) Oral daily  folic acid 1 milliGRAM(s) Oral daily  furosemide   Injectable 40 milliGRAM(s) IV Push every 12 hours  insulin glargine Injectable (LANTUS) 15 Unit(s) SubCutaneous at bedtime  insulin lispro (ADMELOG) corrective regimen sliding scale   SubCutaneous Before meals and at bedtime  insulin lispro Injectable (ADMELOG) 12 Unit(s) SubCutaneous three times a day before meals  insulin NPH human recombinant 10 Unit(s) SubCutaneous once  levETIRAcetam 750 milliGRAM(s) Oral two times a day  metoprolol tartrate 12.5 milliGRAM(s) Oral two times a day  multivitamin  Chewable 1 Tablet(s) Oral daily  polyethylene glycol 3350 17 Gram(s) Oral daily  predniSONE   Tablet 4 milliGRAM(s) Oral two times a day  rivaroxaban 10 milliGRAM(s) Oral <User Schedule>  senna 2 Tablet(s) Oral at bedtime  tamsulosin 0.4 milliGRAM(s) Oral at bedtime    MEDICATIONS  (PRN):  acetaminophen   Tablet .. 650 milliGRAM(s) Oral every 6 hours PRN Temp greater or equal to 38C (100.4F), Moderate Pain (4 - 6)  dextrose 40% Gel 15 Gram(s) Oral once PRN Blood Glucose LESS THAN 70 milliGRAM(s)/deciliter  glucagon  Injectable 1 milliGRAM(s) IntraMuscular once PRN Glucose LESS THAN 70 milligrams/deciliter      PHYSICAL EXAM  Vital Signs Last 24 Hrs  T(C): 36.3 (27 Oct 2020 09:35), Max: 36.5 (26 Oct 2020 16:47)  T(F): 97.3 (27 Oct 2020 09:35), Max: 97.7 (26 Oct 2020 16:47)  HR: 95 (27 Oct 2020 09:35) (88 - 105)  BP: 104/70 (27 Oct 2020 09:35) (94/64 - 129/80)  BP(mean): --  RR: 20 (27 Oct 2020 09:35) (18 - 20)  SpO2: 96% (27 Oct 2020 09:35) (96% - 99%)      Constitutional: wn/wd in NAD.   HEENT: NCAT, MMM, OP clear, EOMI, , no proptosis or lid retraction  Neck: no thyromegaly or palpable thyroid nodules   Respiratory: lungs CTAB.  Cardiovascular: regular rhythm, normal S1 and S2, RUSB III/VI murmur with radiation to carotids.  GI: soft, NT/ND, no masses/HSM appreciated.  Neurology: no tremors, DTR 2+  Skin: no visible rashes/lesions. no acanthosis nigricans. no hyperlipotrophy. no cushing's stigmata.  Psychiatric: AAO x 3, normal affect/mood.  Ext: radial pulses intact, DP pulses intact, extremities warm, no cyanosis, clubbing. + LES edema, discoloration, thin skin        LABS:                        13.1   8.36  )-----------( 139      ( 27 Oct 2020 08:21 )             41.5     10-27    142  |  100  |  28<H>  ----------------------------<  358<H>  3.8   |  31  |  0.76    Ca    9.0      27 Oct 2020 08:21  Phos  3.5     10  Mg     2.2     10-27    TPro  6.3  /  Alb  3.2<L>  /  TBili  0.8  /  DBili  x   /  AST  32  /  ALT  36  /  AlkPhos  125<H>  10    PTT - ( 26 Oct 2020 05:59 )  PTT:35.2 sec        HbA1C:   CAPILLARY BLOOD GLUCOSE      POCT Blood Glucose.: 325 mg/dL (27 Oct 2020 09:03)  POCT Blood Glucose.: 161 mg/dL (26 Oct 2020 21:50)  POCT Blood Glucose.: 202 mg/dL (26 Oct 2020 17:45)  POCT Blood Glucose.: 209 mg/dL (26 Oct 2020 12:26)      A/P:89M with atrial fibrillation (on Xarelto 10 mg daily), spinal stenosis, bullous pemphigoid, b/l LE insufficiency w/ ulceration, DM, history of seizures, CVA, presents with altered mental status and fever being treated for CAP and possible LES cellulitis, also with hyperglycemia.     1.  DM - steroid/infection induced hyperglycemia.   A1C: 11.7%  Weight: 90kg BMI 30  Cr/GRF: 0.92/70  EF: 49%, severe AS, mod MR  please increase lantus to 20m units at bedtime  Increase lispro units to 15 tid with meals.   Continue consistent carb diet   Please continue lispro moderate dose sliding scale four times daily with meals and at bedtime    Pt's fingerstick glucose goal is 100-200.      2.  Hyperlipidemia - goal LDL < 70  check lipids    3.  Obesity - outpatient medical management.    consider GLP-1 agonist.     Will continue to monitor     For discharge, pt can continue Lantus 20 units at bedtime, lispro 15 units TID before meals, and lispro MISS before meals and at bedtime. Will need to f/u with endocrinologist upon discharge from [facility] for further management.    Pt can follow up at discharge with private physician.      case seen and discussed with Dr. Aguirre  and update primary team

## 2020-10-27 NOTE — DISCHARGE NOTE NURSING/CASE MANAGEMENT/SOCIAL WORK - NSDCPEXARELTODIET_GEN_ALL_CORE
sudden onset/resolved Eat healthy foods you enjoy. Rivaroxaban/Xarelto DOES NOT have a special diet. Limit your alcohol intake.

## 2020-10-27 NOTE — PROGRESS NOTE ADULT - PROBLEM SELECTOR PLAN 2
-2/2 HF exacerbation  -f/u HF recs: was on lasix 60 TID but now becoming hypernatremic and alkalotic w/ minimal PO intake  -given diamox this AM  -good urine output overnight
2/2 strep bacteremia from cellulitis  -appreciaite primary team managment  -c/w ceftraixone 2g q24h for total 14 day course (10/23-11/5)  -will continue abx at [facility]  -Antibiotics within [facility]
patient sat 93% on arrival on 6L NC, likely from bilateral bronchopulmonary congestion w/ L basilar opacity, likely from chronic microaspiration resulting in PNA  - plan as below
-2/2 HF exacerbation  -f/u HF recs: was on lasix 60 TID but hypernatremic and alkalotic w/ minimal PO intake over weekend  -40mg IVP BID and diamox 250mg PO BID today, f/u recs for tomorrow  -good urine output overnight  -wean off O2 as tolerated

## 2020-10-27 NOTE — DISCHARGE NOTE PROVIDER - NSDCCPCAREPLAN_GEN_ALL_CORE_FT
PRINCIPAL DISCHARGE DIAGNOSIS  Diagnosis: Sepsis, due to unspecified organism, unspecified whether acute organ dysfunction present  Assessment and Plan of Treatment: You came in and were found to have a bacterial infection of your blood. You were treated with antibiotics and will continue antibiotics at the rehab facility. You improved clinically and your blood cultures cleared. Please continue your antibiotics and follow-up at your primary care appointment as scheduled.      SECONDARY DISCHARGE DIAGNOSES  Diagnosis: Lung infiltrate  Assessment and Plan of Treatment: You came in and were found to have an infection. Your had a chest xray that showed a pneumonia, or an infection that inflames air sacs in one or both lungs, which may fill with fluid. With pneumonia, the air sacs may fill with fluid or pus. The infection can be life-threatening to infants, children, and people over 65. Symptoms include cough with phlegm or pus, fever, chills, and difficulty breathing. Antibiotics can treat many forms of pneumonia. Some forms of pneumonia can be prevented by vaccines. You were treated with antibiotics and will continue at rehab.    Diagnosis: Cellulitis of right lower extremity  Assessment and Plan of Treatment: You came in and were found to have an infection of your blood. It is likely that the infection in your blood was due to the chronic wounds and skin changes of your lower legs. We treated you with antibiotics, which you will continue in rehab.    Diagnosis: Diabetes mellitus  Assessment and Plan of Treatment: You have a diagnosis of type 2 diabetes (sometimes called type 2 "diabetes mellitus"). This is a disorder that disrupts the way your body uses sugar. All the cells in your body need sugar to work normally. Sugar gets into the cells with the help of a hormone called insulin. If there is not enough insulin, or if the body stops responding to insulin, sugar builds up in the blood. That is what happens to people with diabetes. Type 2 diabetes usually causes no symptoms. When symptoms do occur, they include the need to urinate often, intense thirst and blurry vision. Even though type 2 diabetes might not make you feel sick, it can cause serious problems over time, if it is not treated. The disorder can lead to heart attacks, strokes, kidney disease, vision problems (or even blindness), pain or loss of feeling in the hands and feet, and the need to have fingers, toes, or other body parts removed (amputated). In addition to maintaining an active lifestyle, losing weight, eating right, and not smoking it is important to take your diabetes medications as directed to control your blood sugar and prevent the possible complications from this disease.    Diagnosis: Altered mental status, unspecified altered mental status type  Assessment and Plan of Treatment: You came in and were found to be confused. It is likely that your confusion was caused by your infections. Your confusion improved as we treated your infection with antibiotics.    Diagnosis: Dysphagia  Assessment and Plan of Treatment: You came in with a pneumonia. It is likely this pneumonia was caused by aspiration, or when food or liquid goes down the wrong tube and into your lungs. You had a barium swallow test that shows you are at risk of aspiration. Speech and swallow evaluated you and recommended:  A full liquid diet.  Nectar-thick liquids only  Only use spoon. No cups/straws.  Alternate food with liquid; check mouth frequently for oral residue/pocketing; maintain upright posture during/after eating for 30 mins; small sips/bites; Clean oral cavity after meals prior to sleep  You had a discussion about  this risk of aspiration, and you decided you would like to continue to eat to allow for comfort and pleasure and the risks of aspiration were discussed.     PRINCIPAL DISCHARGE DIAGNOSIS  Diagnosis: Sepsis, due to unspecified organism, unspecified whether acute organ dysfunction present  Assessment and Plan of Treatment: You came in and were found to have a bacterial infection of your blood. You were treated with antibiotics and will continue antibiotics at the rehab facility. You improved clinically and your blood cultures cleared. Please continue your antibiotics and follow-up at your primary care appointment as scheduled.      SECONDARY DISCHARGE DIAGNOSES  Diagnosis: Dysphagia  Assessment and Plan of Treatment: You came in with a pneumonia. It is likely this pneumonia was caused by aspiration, or when food or liquid goes down the wrong tube and into your lungs. You had a barium swallow test that shows you are at risk of aspiration. Speech and swallow evaluated you and recommended:  A full liquid diet.  Nectar-thick liquids only  Only use spoon. No cups/straws.  Alternate food with liquid; check mouth frequently for oral residue/pocketing; maintain upright posture during/after eating for 30 mins; small sips/bites; Clean oral cavity after meals prior to sleep  You had a discussion about  this risk of aspiration, and you decided you would like to continue to eat to allow for comfort and pleasure and the risks of aspiration were discussed.    Diagnosis: Diabetes mellitus  Assessment and Plan of Treatment: You came in with a HbA1c of 11.7, which measures the average sugar levels in your blood. This is high and meets criteria for diabetes. You have a diagnosis of type 2 diabetes (sometimes called type 2 "diabetes mellitus"). This is a disorder that disrupts the way your body uses sugar. All the cells in your body need sugar to work normally. Sugar gets into the cells with the help of a hormone called insulin. If there is not enough insulin, or if the body stops responding to insulin, sugar builds up in the blood. That is what happens to people with diabetes. Type 2 diabetes usually causes no symptoms. When symptoms do occur, they include the need to urinate often, intense thirst and blurry vision. Even though type 2 diabetes might not make you feel sick, it can cause serious problems over time, if it is not treated. The disorder can lead to heart attacks, strokes, kidney disease, vision problems (or even blindness), pain or loss of feeling in the hands and feet, and the need to have fingers, toes, or other body parts removed (amputated). In addition to maintaining an active lifestyle, losing weight, eating right, and not smoking it is important to take your diabetes medications as directed to control your blood sugar and prevent the possible complications from this disease.    Diagnosis: Lung infiltrate  Assessment and Plan of Treatment: You came in and were found to have an infection. Your had a chest xray that showed a pneumonia, or an infection that inflames air sacs in one or both lungs, which may fill with fluid. With pneumonia, the air sacs may fill with fluid or pus. The infection can be life-threatening to infants, children, and people over 65. Symptoms include cough with phlegm or pus, fever, chills, and difficulty breathing. Antibiotics can treat many forms of pneumonia. Some forms of pneumonia can be prevented by vaccines. You were treated with antibiotics and will continue at rehab.    Diagnosis: Cellulitis of right lower extremity  Assessment and Plan of Treatment: You came in and were found to have an infection of your blood. It is likely that the infection in your blood was due to the chronic wounds and skin changes of your lower legs. We treated you with antibiotics, which you will continue in rehab.    Diagnosis: Altered mental status, unspecified altered mental status type  Assessment and Plan of Treatment: You came in and were found to be confused. It is likely that your confusion was caused by your infections. Your confusion improved as we treated your infection with antibiotics.     PRINCIPAL DISCHARGE DIAGNOSIS  Diagnosis: Sepsis, due to unspecified organism, unspecified whether acute organ dysfunction present  Assessment and Plan of Treatment: You came in and were found to have a bacterial infection of your blood. You were treated with antibiotics and will continue antibiotics at the rehab facility. You improved clinically and your blood cultures cleared. Please continue your antibiotics and follow-up at your primary care appointment as scheduled.      SECONDARY DISCHARGE DIAGNOSES  Diagnosis: Heart failure  Assessment and Plan of Treatment: You have a diagonsis of congestive heart failure. To manage this you are on the following medications: Lasix 40mg twice a day and diamox 250mg twice a day.  Congestive heart failure can cause make it harder for your heart to pump blood to the rest of your body. As a result, blood can get backed up into your lungs or into your legs. In order to avoid this, make sure to take all of your medications for heart failure as prescribed. This will keep your heart functioning well. If you notice increased difficulty with breathing, cough with bloody mucous, increased swelling in the legs, or chest pain be sure to contact your PCP or call 911 as you may need more treatment. Additionally be sure to follow up with your PCP and Cardiologist on a regular basis to make sure no additional medications or medication changes need to be made.    Diagnosis: Dysphagia  Assessment and Plan of Treatment: You came in with a pneumonia. It is likely this pneumonia was caused by aspiration, or when food or liquid goes down the wrong tube and into your lungs. You had a barium swallow test that shows you are at risk of aspiration. Speech and swallow evaluated you and recommended:  A full liquid diet.  Nectar-thick liquids only  Only use spoon. No cups/straws.  Alternate food with liquid; check mouth frequently for oral residue/pocketing; maintain upright posture during/after eating for 30 mins; small sips/bites; Clean oral cavity after meals prior to sleep  You had a discussion about  this risk of aspiration, and you decided you would like to continue to eat to allow for comfort and pleasure and the risks of aspiration were discussed.    Diagnosis: Diabetes mellitus  Assessment and Plan of Treatment: You came in with a HbA1c of 11.7, which measures the average sugar levels in your blood. This is high and meets criteria for diabetes. You have a diagnosis of type 2 diabetes (sometimes called type 2 "diabetes mellitus"). This is a disorder that disrupts the way your body uses sugar. All the cells in your body need sugar to work normally. Sugar gets into the cells with the help of a hormone called insulin. If there is not enough insulin, or if the body stops responding to insulin, sugar builds up in the blood. That is what happens to people with diabetes. Type 2 diabetes usually causes no symptoms. When symptoms do occur, they include the need to urinate often, intense thirst and blurry vision. Even though type 2 diabetes might not make you feel sick, it can cause serious problems over time, if it is not treated. The disorder can lead to heart attacks, strokes, kidney disease, vision problems (or even blindness), pain or loss of feeling in the hands and feet, and the need to have fingers, toes, or other body parts removed (amputated). In addition to maintaining an active lifestyle, losing weight, eating right, and not smoking it is important to take your diabetes medications as directed to control your blood sugar and prevent the possible complications from this disease. You will need insulin 20 units of lantus at bedtime and 15 units of lispro three times a day before each meal. Please follow up with an endocrinologist after your discharge to manage your diabetes.    Diagnosis: Lung infiltrate  Assessment and Plan of Treatment: You came in and were found to have an infection. Your had a chest xray that showed a pneumonia, or an infection that inflames air sacs in one or both lungs, which may fill with fluid. With pneumonia, the air sacs may fill with fluid or pus. The infection can be life-threatening to infants, children, and people over 65. Symptoms include cough with phlegm or pus, fever, chills, and difficulty breathing. Antibiotics can treat many forms of pneumonia. Some forms of pneumonia can be prevented by vaccines. You were treated with antibiotics and will continue at rehab.    Diagnosis: Cellulitis of right lower extremity  Assessment and Plan of Treatment: You came in and were found to have an infection of your blood. It is likely that the infection in your blood was due to the chronic wounds and skin changes of your lower legs. We treated you with antibiotics, which you will continue in rehab.    Diagnosis: Altered mental status, unspecified altered mental status type  Assessment and Plan of Treatment: You came in and were found to be confused. It is likely that your confusion was caused by your infections. Your confusion improved as we treated your infection with antibiotics.

## 2020-10-27 NOTE — PROGRESS NOTE ADULT - ASSESSMENT
89M with atrial fibrillation, spinal stenosis, bullous pemphigoid, b/l LE insufficiency w/ ulceration, DM, history of seizures, CVA, presents Strep bacteremia 2/2 cellulitis and HFrEF excerbation. Palliative care consulted for GOC in the setting of progressive dysphagia and multiple co morbs. DC today to [facility].

## 2020-10-27 NOTE — DISCHARGE NOTE NURSING/CASE MANAGEMENT/SOCIAL WORK - PATIENT PORTAL LINK FT
You can access the FollowMyHealth Patient Portal offered by [facility] by registering at the following website: [email or web address]. By joining [facility]’s FollowMyHealth portal, you will also be able to view your health information using other applications (apps) compatible with our system.

## 2020-10-27 NOTE — PROGRESS NOTE ADULT - PROBLEM SELECTOR PLAN 5
-f/u endo recs, AM FS improved
-see 10/26 [facility] note  -DNR/DNI  -No Peg tube  -New MOLST electing no peg completed and placed in physical chart  -In event that dysphagia progresses, pt's wife wishes to have pleasure food for comfort   -Today pt verbalizes that he does "not like being in the hospital... would rather be home with his wife and adult son."  -Dc to [facility] today
patient w/ afib, on low dose Xarelto, 10 mg daily  - c/w Xarelto 10 mg PO q24h  - rate control w/ metoprolol, currently 12.5 mg BID w/ hold parameters
Possibly aspiration/CAP. Barium swallowing showing aspiration.   s/p vancomycin 1.5g in ED  -abx as above  -azithromycin dc'd as patient legionella neg  -wean off o2 as tolerated  -diet as below

## 2020-10-27 NOTE — PROGRESS NOTE ADULT - ASSESSMENT
A/P: 89M with atrial fibrillation (on Xarelto 10 mg daily), spinal stenosis, bullous pemphigoid, b/l LE insufficiency w/ ulceration, DM, history of seizures presents with altered mental status and fever. Adm to [facility] w/concerns for severe sepsis 2/2 gram (+) cocci bacteremia. Heart failure consulted for management recommendations.    #HFmrEF Exacerbation  - TTE: Norm LV size, mild concentric LVH, mild LVSD (EF 49%). Severe AS w/LFLG (Gmean 17, GRACE 1, DI 0.19, SVI 25). Mild AI. At least mod MR w/likely posterior MV leaflet prolapse, mild-mod MS (Gmean 5.7 @76), and mod LAE (ROMEO 42). Significant MAC. PASP 34. Norm RV size w/RVSD (TAPSE 14, S' 8.6). Mild TR w/mild VIBHA. Mild ao root (3.8cm) and prox asc ao dilation (3.7cm).  - Pt responding well to diuresis, still appears overloaded on exam w/mild hypoxic resp failure (satting 92% on 2LNC during exam). Would c/w Lasix 40mg IVP BID and Diamox 250mg PO BID as well to prevent loop diuretic-assoc metabolic alkalosis. Monitor I/O, lytes, replete PRN. ACE-wraps for chronic venous insufficiency  - Multiple valvular abn. Likely paradoxical LFLG AS w/reduced GRACE and DI but low Gmean due to dec SVI. At least mod MR w/likely posterior leaflet prolapse and mild-mod MS. Given advanced age, multiple comorbidities, and deconditioning, would recommend GOC discussion w/pt and family prior to any further w/u. Would likely be poor candidate for intervention.  - Mild dilation of ao root and prox asc ao on TTE. Outpatient f/u    Pt seen and d/w HF consult attending.    --  Gurvinder Lira MD  Cardiology PGY-6 A/P: 89M with atrial fibrillation (on Xarelto 10 mg daily), spinal stenosis, bullous pemphigoid, b/l LE insufficiency w/ ulceration, DM, history of seizures presents with altered mental status and fever. Adm to [facility] w/concerns for severe sepsis 2/2 gram (+) cocci bacteremia. Heart failure consulted for management recommendations.    #HFmrEF Exacerbation  - TTE: Norm LV size, mild concentric LVH, mild LVSD (EF 49%). Severe AS w/LFLG (Gmean 17, GRACE 1, DI 0.19, SVI 25). Mild AI. At least mod MR w/likely posterior MV leaflet prolapse, mild-mod MS (Gmean 5.7 @76), and mod LAE (ROMEO 42). Significant MAC. PASP 34. Norm RV size w/RVSD (TAPSE 14, S' 8.6). Mild TR w/mild VIBHA. Mild ao root (3.8cm) and prox asc ao dilation (3.7cm).  - Pt responding well to diuresis (net -2.5L o/n), still appears overloaded on exam w/mild hypoxic resp failure (satting 92% on 2LNC during exam). Would c/w Lasix 40mg IVP BID and Diamox 250mg PO BID as well to prevent loop diuretic-assoc metabolic alkalosis. Monitor I/O, lytes, replete PRN. ACE-wraps for chronic venous insufficiency  - Multiple valvular abn. Likely paradoxical LFLG AS w/reduced GRACE and DI but low Gmean due to dec SVI. At least mod MR w/likely posterior leaflet prolapse and mild-mod MS. Given advanced age, multiple comorbidities, and deconditioning, would recommend GOC discussion w/pt and family prior to any further w/u. Would likely be poor candidate for intervention.  - Mild dilation of ao root and prox asc ao on TTE. Outpatient f/u    Pt seen and d/w HF consult attending.    --  Gurvinder Lira MD  Cardiology PGY-6

## 2020-10-27 NOTE — PROGRESS NOTE ADULT - REASON FOR ADMISSION
altered mental status

## 2020-10-27 NOTE — PROGRESS NOTE ADULT - NSHPATTENDINGPLANDISCUSS_GEN_ALL_CORE
mary parekh
mary parekh
patient, housestaff
CHF consult team and 4U team
Dr. GALLO and floor staff
cardiology fellow and 4U team
hs
hs

## 2020-10-27 NOTE — DISCHARGE NOTE PROVIDER - CARE PROVIDER_API CALL
Siegler, Eugenia  Phone: [phone]  Fax: [phone]  Established Patient  Follow Up Time:     Ruel Duarte  [address] [place] [address]  Phone: [phone]  Fax: (   )    -  Established Patient  Follow Up Time:    Siegler, Eugenia  Phone: [phone]  Fax: [phone]  Established Patient  Scheduled Appointment: 11/06/2020 01:00 PM    Ruel Duarte  [address] [place] [address]  Phone: [phone]  Fax: (   )    -  Established Patient  Follow Up Time:    Siegler, Eugenia  Phone: [phone]  Fax: [phone]  Established Patient  Scheduled Appointment: 11/06/2020 01:00 PM    Ruel Duarte  [address] [place] [address]  Phone: [phone]  Fax: (   )    -  Established Patient  Follow Up Time:     Division of Endocrinology & Metabolism,   [address], [address]  [place] [address]  Phone: [phone]  Fax: (   )    -  Follow Up Time: 2 weeks

## 2020-10-27 NOTE — PROGRESS NOTE ADULT - PROBLEM SELECTOR PLAN 1
progressive in recent months. per pt's wife, pt regularly coughing during all meals  Pt sp S+S Eval at [facility] 7/2020, recommended unrestricted diet  -this admission sp barium study cw aspiration   -sp S+S eval: recommending nectar thick, full liquid diet, spoon fed only. Tolerating. Able to spoon feed himself.  -GOC 10/25, peg tube is NOT in line with GOC.

## 2020-10-27 NOTE — PROGRESS NOTE ADULT - PROBLEM SELECTOR PLAN 3
2/2 HF exacerbation  -appreciate HF cardiology and primary team management  -f/u HF recs: was on lasix 60 TID but hypernatremic and alkalotic w/ minimal PO intake over weekend  -40mg IVP BID and diamox 250mg PO BID today, f/u recs for tomorrow  -good urine output overnight  -wean off O2 as tolerated

## 2020-10-27 NOTE — PROGRESS NOTE ADULT - PROBLEM SELECTOR PLAN 4
TTE (10/23) showing LV EF49%, moderately dilated left atrium, mildly dilated right atrium, mildly dilated aortic root, 3.8 cm, mildly dilated proximal ascending aorta, 3.7 cm, severe aortic stenosis, mild aortic regurgitation, at least moderate mitral regurgitation, likely posterior leaflet prolapse, mild-to-moderate mitral stenosis, mild tricuspid regurgitation, no evidence of pulmonary hypertension  -appreciaite HF cardiology, primary team management  -GOC discussion, per HF patient poor candidate for intervention. Recommend GOC discussion with pt and pt's wife Laureen in future if intervention indicated

## 2020-10-27 NOTE — DISCHARGE NOTE NURSING/CASE MANAGEMENT/SOCIAL WORK - NSDCPEPTSTRK_GEN_ALL_CORE
Call 911 for stroke/Stroke support groups for patients, families, and friends/Need for follow up after discharge/Prescribed medications/Stroke education booklet/Stroke warning signs and symptoms/Signs and symptoms of stroke/Risk factors for stroke

## 2020-10-27 NOTE — DISCHARGE NOTE PROVIDER - NSDCCPTREATMENT_GEN_ALL_CORE_FT
PRINCIPAL PROCEDURE  Procedure: Transthoracic echocardiography (TTE)  Findings and Treatment: (Part 1) 10/23  CONCLUSIONS:   1. Normal left ventricular cavity size.   2. Mild symmetric left ventricular hypertrophy.   3. Mildly reduced left ventricular systolic function with global hypokinesis, LV EF49%.   4. Normal right ventricular size.   5. Reduced right ventricular systolic function.   6. Moderately dilated left atrium.   7. Mildly dilated right atrium.   8. Mildly dilated aortic root, 3.8 cm.   9. Mildly dilated proximal ascending aorta, 3.7 cm.  10. Severe aortic stenosis, low-flow, low-gradient, GRACE 1.0cm2, PV 2.7m/s, MG 17mmHg, LVOT/AV 0.19, SVI 25ml/m2.  11. Mild aortic regurgitation.  12. At least moderate mitral regurgitation, likely posterior leaflet prolapse.  13. Mild-to-moderate mitral stenosis, MG 5.7mmHg at 76bpm.  14. Mild tricuspid regurgitation.  15. No evidence of pulmonary hypertension, pulmonary artery systolic pressure is 34 mmHg.  16. No pericardial effusion.  17. Consider FANY to better visualize the mitral valve and elucidate the mechanism of mitral regurgitation, if clinically indicated.        SECONDARY PROCEDURE  Procedure: Transthoracic echocardiography (TTE)  Findings and Treatment: (Part 2)  2D AND M-MODE MEASUREMENTS (normal ranges within parentheses):  Left Ventricle:   Normal - Men Normal - Women  IVSd (2D):      1.26 cm  (0.6-1.0)     (0.6-0.9)  LVPWd (2D):     1.07 cm  (0.6-1.0)     (0.6-0.9)  LVIDd (2D):     4.64 cm  (4.2-5.8)     (3.8-5.2)  LVIDs (2D):     3.92 cm  LV FS (2D):     15.5 %     (>25%)  LV EF (MOD BP):  49 %      (>55%)  .  Aorta/Left Atrium:           Normal - Men Normal - Women  Aortic Root (2D):    3.80 cm  (2.4-3.7)  Left Atrium (Mmode): 4.90 cm  (1.9-4.0)  .  LA Volume A/L:  .  Right Ventricle:  .  LV DIASTOLIC FUNCTION:  .  MV Peak E: 123.00 cm/s MV e' lat:  13.2 cm/s MV E/e' lat ratio: 9.3  .                      MV e' med:  6.7 cm/s  MV E/e' med ratio: 18.4                         Decel Time: 148 msec  MV E/e' av.9  SPECTRAL DOPPLER ANALYSIS:  .  Mitral Valve:  MV Max Bossman:          MV P1/2 Time: 42.92 msec  MV Mean Grad: 6 mmHg MV Area, PHT: 5.13 cm²  .  Aortic Valve: AoV Max Bossman:    2.70 m/s AoV Peak P mmHg  AoV Mean P mmHg  LVOT Vmax:      0.59 m/s LVOT VTI:      0.095 m  LVOT Diameter: 2.60 cm  AoV Area, VMax: 1.15 cm² AoV Area, VTI: 1.02 cm² AoV Area, Vmn: 1.25 cm²  .  Tricuspid Valve and PA/RV Systolic Pressure: TR Max Velocity: 2.78 m/s RA Pressure: 3 mmHg RVSP/PASP: 34 mmHg  TAPSE:           14 mm    RV S':       9 cm/s      Procedure: Transthoracic echocardiography (TTE)  Findings and Treatment: (Part 3)  Left Ventricle:  The left ventricle cavity size is normal. There is mild concentric left ventricular hypertrophy. There are no regional wall motion abnormalities seen. Left ventricular systolic function is mildly reduced with a calculated ejection fraction of 49% with global hypokinesis. Analysis of left ventricular diastolic function and filling pressure is made challenging by the presence of significant mitral annular calcification.  Right Ventricle:  The right ventricle is normal in size. Right ventricular systolic function is reduced. The tricuspid annular plane systolic excursion (TAPSE) is 14.00 mm (normal >=17 mm). RV tissue Doppler S' is 8.60cm/s (normal >10 cm/s).  Left Atrium:  The left atrium is moderately dilated. Left atrial volume index (ROMEO) is 42.0 ml/m².  Right Atrium:  The right atrium is mildly dilated. Right atrial volume index (ECHO) is 36.00 ml/m².    Procedure: Transthoracic echocardiography (TTE)  Findings and Treatment: (Part 4)  Aortic Valve:  The aortic leaflets are thickened and calcified with reduced systolic leaflet excursion. The peak transvalvular velocity is 2.70 m/s, the mean transvalvular gradient is 17.00 mmHg, and the LVOT/AV velocity ratio is 0.19. The aortic valve area (estimatedvia the continuity method) is 1.02 cm². The calculated left ventricular stroke volume index is 25.00 ml/m² (normal >35 ml/m2). There is low-flow, low-gradient severe aortic stenosis. There is mild aortic regurgitation.  Mitral Valve:  There is at least moderate mitral regurgitation which is highly eccentric and anteriorly directed likely consistent with posterior leaflet prolapse. There is mild mitral annular calcification. There is mild-to-moderate mitral stenosis. The mean transvalvular gradient is 5.70 mmHg at a heart rate of 76 bpm.  Tricuspid Valve:  Structurally normal tricuspid valve with normal leaflet excursion. There is mild tricuspid regurgitation. Pulmonary artery systolic pressure (estimated using the tricuspid regurgitant gradient and an estimate of right atrial pressure) is 34 mmHg.  Inferior Vena Cava:  The inferior vena cava is normal in size (<2.1cm) with normal inspiratory collapse (>50%) consistent with normal right atrial pressure (  3, range 0-5mmHg).  Pulmonic Valve:  Structurally normal pulmonic valve with normal leaflet excursion. There is trace pulmonic regurgitation.  Aorta:  The aortic root is mildly dilated measuring 3.80 cm at level of the sinuses of Valsalva (normal 3.1-3.7 cm for men, 2.7-3.3 cm for women). The proximal ascending aorta is mildly dilated measuring 3.70 cm (normal 2.6-3.4 cm for men, 2.3-3.1 cm for women).  Pericardium:  No pericardial effusion is seen.    Procedure: Barium swallow  Findings and Treatment: EXAM:  XR SWAL FUNC LETHA VID CON STDY                        PROCEDURE DATE:  10/23/2020    INTERPRETATION:  VIDEO FLUOROSCOPIC SWALLOW STUDY dated 10/23/2020 3:02 PM  INDICATION: 89-year-old male with dysphagia.  COMPARISON: None  FLUOROSCOPIC TIME: 5.9 minutes  FINDINGS:  Thin Liquid:  Penetration: Positive.  Aspiration: Positive.  Eliminated with posture/technique: Chin tuck.  Nectar Thick Liquid:  Penetration: Positive.  Aspiration: Positive.  Eliminated with posture/technique: Not applicable.  Honey Thick Liquid:  Penetration: Not applicable.  Aspiration: Not applicable.  Eliminated with posture/technique: Not applicable.  Puree:  Penetration: Trace.  Aspiration: Negative.  Eliminated with posture/technique: Not applicable.  Mechanical soft:  Penetration: Not applicable.  Aspiration: Not applicable.  Eliminated with posture/technique: Not applicable.  Regular consistency:  Penetration: Negative.  Aspiration: Negative.  Eliminated with posture/technique: Not applicable.  Anatomical findings: Degenerative changes in the cervical spine. Small posterior filling defect at the level of C6 consistent with a cricopharyngeal bar.  IMPRESSION: As above. Please see speech pathology report in the patient's chart for complete details regarding swallow function.    Procedure: CXR, single AP view  Findings and Treatment: EXAM:  XR CHEST PORTABLE URGENT 1V                        PROCEDURE DATE:  10/25/2020    INTERPRETATION:  Clinical History: Abnormal chest sounds  Frontal examination  of the chest demonstrates limited inspiration. Heart is within normal limits in transverse diameter. Discoid overlying lower thoracic spine. Congestion and/or infiltrates. Small left effusion.  Impression: Limited examination Congestion and/or infiltrates. Small left effusion     PRINCIPAL PROCEDURE  Procedure: Transthoracic echocardiography (TTE)  Findings and Treatment: (Part 1) 10/23  CONCLUSIONS:   1. Normal left ventricular cavity size.   2. Mild symmetric left ventricular hypertrophy.   3. Mildly reduced left ventricular systolic function with global hypokinesis, LV EF49%.   4. Normal right ventricular size.   5. Reduced right ventricular systolic function.   6. Moderately dilated left atrium.   7. Mildly dilated right atrium.   8. Mildly dilated aortic root, 3.8 cm.   9. Mildly dilated proximal ascending aorta, 3.7 cm.  10. Severe aortic stenosis, low-flow, low-gradient, GRACE 1.0cm2, PV 2.7m/s, MG 17mmHg, LVOT/AV 0.19, SVI 25ml/m2.  11. Mild aortic regurgitation.  12. At least moderate mitral regurgitation, likely posterior leaflet prolapse.  13. Mild-to-moderate mitral stenosis, MG 5.7mmHg at 76bpm.  14. Mild tricuspid regurgitation.  15. No evidence of pulmonary hypertension, pulmonary artery systolic pressure is 34 mmHg.  16. No pericardial effusion.  17. Consider FANY to better visualize the mitral valve and elucidate the mechanism of mitral regurgitation, if clinically indicated.        SECONDARY PROCEDURE  Procedure: Transthoracic echocardiography (TTE)  Findings and Treatment: (Part 2)  2D AND M-MODE MEASUREMENTS (normal ranges within parentheses):  Left Ventricle:   Normal - Men Normal - Women  IVSd (2D):      1.26 cm  (0.6-1.0)     (0.6-0.9)  LVPWd (2D):     1.07 cm  (0.6-1.0)     (0.6-0.9)  LVIDd (2D):     4.64 cm  (4.2-5.8)     (3.8-5.2)  LVIDs (2D):     3.92 cm  LV FS (2D):     15.5 %     (>25%)  LV EF (MOD BP):  49 %      (>55%)  .  Aorta/Left Atrium:           Normal - Men Normal - Women  Aortic Root (2D):    3.80 cm  (2.4-3.7)  Left Atrium (Mmode): 4.90 cm  (1.9-4.0)  .  LA Volume A/L:  .  Right Ventricle:  .  LV DIASTOLIC FUNCTION:  .  MV Peak E: 123.00 cm/s MV e' lat:  13.2 cm/s MV E/e' lat ratio: 9.3  .                      MV e' med:  6.7 cm/s  MV E/e' med ratio: 18.4                         Decel Time: 148 msec  MV E/e' av.9  SPECTRAL DOPPLER ANALYSIS:  .  Mitral Valve:  MV Max Bossman:          MV P1/2 Time: 42.92 msec  MV Mean Grad: 6 mmHg MV Area, PHT: 5.13 cm²  .  Aortic Valve: AoV Max Bossman:    2.70 m/s AoV Peak P mmHg  AoV Mean P mmHg  LVOT Vmax:      0.59 m/s LVOT VTI:      0.095 m  LVOT Diameter: 2.60 cm  AoV Area, VMax: 1.15 cm² AoV Area, VTI: 1.02 cm² AoV Area, Vmn: 1.25 cm²  .  Tricuspid Valve and PA/RV Systolic Pressure: TR Max Velocity: 2.78 m/s RA Pressure: 3 mmHg RVSP/PASP: 34 mmHg  TAPSE:           14 mm    RV S':       9 cm/s      Procedure: Transthoracic echocardiography (TTE)  Findings and Treatment: (Part 3)  Left Ventricle:  The left ventricle cavity size is normal. There is mild concentric left ventricular hypertrophy. There are no regional wall motion abnormalities seen. Left ventricular systolic function is mildly reduced with a calculated ejection fraction of 49% with global hypokinesis. Analysis of left ventricular diastolic function and filling pressure is made challenging by the presence of significant mitral annular calcification.  Right Ventricle:  The right ventricle is normal in size. Right ventricular systolic function is reduced. The tricuspid annular plane systolic excursion (TAPSE) is 14.00 mm (normal >=17 mm). RV tissue Doppler S' is 8.60cm/s (normal >10 cm/s).  Left Atrium:  The left atrium is moderately dilated. Left atrial volume index (ROMEO) is 42.0 ml/m².  Right Atrium:  The right atrium is mildly dilated. Right atrial volume index (ECHO) is 36.00 ml/m².    Procedure: Transthoracic echocardiography (TTE)  Findings and Treatment: (Part 4)  Aortic Valve:  The aortic leaflets are thickened and calcified with reduced systolic leaflet excursion. The peak transvalvular velocity is 2.70 m/s, the mean transvalvular gradient is 17.00 mmHg, and the LVOT/AV velocity ratio is 0.19. The aortic valve area (estimatedvia the continuity method) is 1.02 cm². The calculated left ventricular stroke volume index is 25.00 ml/m² (normal >35 ml/m2). There is low-flow, low-gradient severe aortic stenosis. There is mild aortic regurgitation.  Mitral Valve:  There is at least moderate mitral regurgitation which is highly eccentric and anteriorly directed likely consistent with posterior leaflet prolapse. There is mild mitral annular calcification. There is mild-to-moderate mitral stenosis. The mean transvalvular gradient is 5.70 mmHg at a heart rate of 76 bpm.  Tricuspid Valve:  Structurally normal tricuspid valve with normal leaflet excursion. There is mild tricuspid regurgitation. Pulmonary artery systolic pressure (estimated using the tricuspid regurgitant gradient and an estimate of right atrial pressure) is 34 mmHg.  Inferior Vena Cava:  The inferior vena cava is normal in size (<2.1cm) with normal inspiratory collapse (>50%) consistent with normal right atrial pressure (  3, range 0-5mmHg).  Pulmonic Valve:  Structurally normal pulmonic valve with normal leaflet excursion. There is trace pulmonic regurgitation.  Aorta:  The aortic root is mildly dilated measuring 3.80 cm at level of the sinuses of Valsalva (normal 3.1-3.7 cm for men, 2.7-3.3 cm for women). The proximal ascending aorta is mildly dilated measuring 3.70 cm (normal 2.6-3.4 cm for men, 2.3-3.1 cm for women).  Pericardium:  No pericardial effusion is seen.    Procedure: Barium swallow  Findings and Treatment: EXAM:  XR SWAL FUNC LETHA VID CON STDY                        PROCEDURE DATE:  10/23/2020    INTERPRETATION:  VIDEO FLUOROSCOPIC SWALLOW STUDY dated 10/23/2020 3:02 PM  INDICATION: 89-year-old male with dysphagia.  COMPARISON: None  FLUOROSCOPIC TIME: 5.9 minutes  FINDINGS:  Thin Liquid:  Penetration: Positive.  Aspiration: Positive.  Eliminated with posture/technique: Chin tuck.  Nectar Thick Liquid:  Penetration: Positive.  Aspiration: Positive.  Eliminated with posture/technique: Not applicable.  Honey Thick Liquid:  Penetration: Not applicable.  Aspiration: Not applicable.  Eliminated with posture/technique: Not applicable.  Puree:  Penetration: Trace.  Aspiration: Negative.  Eliminated with posture/technique: Not applicable.  Mechanical soft:  Penetration: Not applicable.  Aspiration: Not applicable.  Eliminated with posture/technique: Not applicable.  Regular consistency:  Penetration: Negative.  Aspiration: Negative.  Eliminated with posture/technique: Not applicable.  Anatomical findings: Degenerative changes in the cervical spine. Small posterior filling defect at the level of C6 consistent with a cricopharyngeal bar.  IMPRESSION: As above. Please see speech pathology report in the patient's chart for complete details regarding swallow function.    Procedure: CXR, single AP view  Findings and Treatment: EXAM:  XR CHEST PORTABLE URGENT 1V                        PROCEDURE DATE:  10/25/2020    INTERPRETATION:  Clinical History: Abnormal chest sounds  Frontal examination  of the chest demonstrates limited inspiration. Heart is within normal limits in transverse diameter. Discoid overlying lower thoracic spine. Congestion and/or infiltrates. Small left effusion.  Impression: Limited examination Congestion and/or infiltrates. Small left effusion    Procedure: Hemoglobin A1c  Findings and Treatment: 11.7 on 10/23

## 2020-10-27 NOTE — PROGRESS NOTE ADULT - PROBLEM SELECTOR PLAN 7
-DC to [facility] Today  Palliative medicine will sign off for now. Please reconsult if the patients symptoms change and need to be reassessed, the patients goals of care need to be readdressed based on clinical changes, or if patient is readmitted in the future.
history of prediabetes, does have a family history of diabetes. A1c is 11.7 this admission, and patient presented in volume down state in settting of hyperglycemia, ketonuria, elevated hydoxybutyrate, without acidosis; s/p 20 units of regular insulin in the ED  - C/w moderate insulin sliding scale  - endocrine consultation, appreciate recs  - once a patient is ordered for diet, will be consistent carb w/ evening snack
Currently on lantus 10u and lispro 12u tid  -f/u endo recs, AM FS still high

## 2020-10-27 NOTE — PROGRESS NOTE ADULT - SUBJECTIVE AND OBJECTIVE BOX
CC: altered mental status    SUBJECTIVE: pt seen and examined at bedside. alert to name, place, and year, baseline word finding and delayed responses to questions. more alert and conversant today. does not know why he was admitted to hospital, understands he is going to "rehab" when he leaves. denies lower extremity pain. fair appetite. denies chest pain, palpitations, fever, chills.     ROS:  DYSPNEA: Y mild to moderate with repositioning in bed	  NAUS/VOM: N	  SECRETIONS: N	  AGITATION: N  Pain (Y/N):    N       PEx:  T(C): 36.3 (10-27-20 @ 09:35), Max: 36.5 (10-26-20 @ 16:47)  HR: 95 (10-27-20 @ 09:35) (88 - 105)  BP: 104/70 (10-27-20 @ 09:35) (94/64 - 129/80)  RR: 20 (10-27-20 @ 09:35) (18 - 20)  SpO2: 96% (10-27-20 @ 09:35) (96% - 99%)  Wt(kg): 90.7    General: Elderly male, lying comfortably in bed, NAD, more alert today  HEENT: NC/AT; anicteric sclera; MMD  Cardiovascular: ; +S1/S2, distant harsh holosystolic murmur, regularly irregular  Respiratory: CTA B/L; coarse crackles b/l lower lung fields L>R; no retractions or accessory muscle use; NC   Gastrointestinal: protuberant, soft, NT/ND; distant +BSx4  Extremities: WWP; b/l 2+ pitting edema from proximal knee to ankle (R>L), w/ associated erythema, increased warmth, superficial ulcer at medial aspect RLE; scattered UE ecchymoses noted, R>L; no clubbing or cyanosis  Vascular: 2+ radial pulses B/L  Neurological: AAOx1-2 (name, hospital (but not name of hospital); no focal deficits  Skin: ulcer and discoloration as above      ALLERGIES: No Known Allergies    OPIATE NAÏVE (Y/N): Y    MEDICATIONS: REVIEWED  MEDICATIONS  (STANDING):  acetaZOLAMIDE    Tablet 250 milliGRAM(s) Oral every 12 hours  cefTRIAXone   IVPB 2000 milliGRAM(s) IV Intermittent every 24 hours  dextrose 5%. 1000 milliLiter(s) (50 mL/Hr) IV Continuous <Continuous>  dextrose 50% Injectable 12.5 Gram(s) IV Push once  dextrose 50% Injectable 25 Gram(s) IV Push once  dextrose 50% Injectable 25 Gram(s) IV Push once  finasteride 5 milliGRAM(s) Oral daily  folic acid 1 milliGRAM(s) Oral daily  furosemide   Injectable 40 milliGRAM(s) IV Push every 12 hours  insulin glargine Injectable (LANTUS) 15 Unit(s) SubCutaneous at bedtime  insulin lispro (ADMELOG) corrective regimen sliding scale   SubCutaneous Before meals and at bedtime  insulin lispro Injectable (ADMELOG) 12 Unit(s) SubCutaneous three times a day before meals  insulin NPH human recombinant 10 Unit(s) SubCutaneous once  levETIRAcetam 750 milliGRAM(s) Oral two times a day  metoprolol tartrate 12.5 milliGRAM(s) Oral two times a day  multivitamin  Chewable 1 Tablet(s) Oral daily  polyethylene glycol 3350 17 Gram(s) Oral daily  predniSONE   Tablet 4 milliGRAM(s) Oral two times a day  rivaroxaban 10 milliGRAM(s) Oral <User Schedule>  senna 2 Tablet(s) Oral at bedtime  tamsulosin 0.4 milliGRAM(s) Oral at bedtime    MEDICATIONS  (PRN):  acetaminophen   Tablet .. 650 milliGRAM(s) Oral every 6 hours PRN Temp greater or equal to 38C (100.4F), Moderate Pain (4 - 6)  dextrose 40% Gel 15 Gram(s) Oral once PRN Blood Glucose LESS THAN 70 milliGRAM(s)/deciliter  glucagon  Injectable 1 milliGRAM(s) IntraMuscular once PRN Glucose LESS THAN 70 milligrams/deciliter    LABS: REVIEWED  CBC:                        13.1   8.36  )-----------( 139      ( 27 Oct 2020 08:21 )             41.5     CMP:    10-27    142  |  100  |  28<H>  ----------------------------<  358<H>  3.8   |  31  |  0.76    Ca    9.0      27 Oct 2020 08:21  Phos  3.5     10-26  Mg     2.2     10-27    TPro  6.3  /  Alb  3.2<L>  /  TBili  0.8  /  DBili  x   /  AST  32  /  ALT  36  /  AlkPhos  125<H>  10-26  Albumin, Serum: 3.2 g/dL (10-26-20 @ 05:59)      IMAGING: REVIEWED    Advanced Directives:     DNR/DNI     MOLST completed and placed in physical chart: DNR, DNI, NO peg tube     HCP, DPOA, Living Will: no formal form not in [facility]    Decision maker: The patient is able to participate in simple medical decision making conversations. UNable to make complex decisions today, again pt  unable to explain why he is in hospital   Legal surrogate: legally defaults to wifeLaureen    GOALS OF CARE DISCUSSION       Documentation of GOC: MOSLT:	DNR/DNI; NO feeding tube    REFERRALS: none

## 2020-10-27 NOTE — PROGRESS NOTE ADULT - SUBJECTIVE AND OBJECTIVE BOX
INTERVAL EVENTS:    Pt seen/examined at bedside, no CRAIG o/n. Denies complaints.    MEDICATIONS  (STANDING):  cefTRIAXone   IVPB 2000 milliGRAM(s) IV Intermittent every 24 hours  dextrose 5%. 1000 milliLiter(s) (50 mL/Hr) IV Continuous <Continuous>  dextrose 50% Injectable 12.5 Gram(s) IV Push once  dextrose 50% Injectable 25 Gram(s) IV Push once  dextrose 50% Injectable 25 Gram(s) IV Push once  finasteride 5 milliGRAM(s) Oral daily  folic acid 1 milliGRAM(s) Oral daily  insulin glargine Injectable (LANTUS) 15 Unit(s) SubCutaneous at bedtime  insulin lispro (ADMELOG) corrective regimen sliding scale   SubCutaneous Before meals and at bedtime  insulin lispro Injectable (ADMELOG) 12 Unit(s) SubCutaneous three times a day before meals  levETIRAcetam 750 milliGRAM(s) Oral two times a day  metoprolol tartrate 12.5 milliGRAM(s) Oral two times a day  multivitamin  Chewable 1 Tablet(s) Oral daily  polyethylene glycol 3350 17 Gram(s) Oral daily  predniSONE   Tablet 4 milliGRAM(s) Oral two times a day  rivaroxaban 10 milliGRAM(s) Oral <User Schedule>  senna 2 Tablet(s) Oral at bedtime  tamsulosin 0.4 milliGRAM(s) Oral at bedtime    MEDICATIONS  (PRN):  acetaminophen   Tablet .. 650 milliGRAM(s) Oral every 6 hours PRN Temp greater or equal to 38C (100.4F), Moderate Pain (4 - 6)  dextrose 40% Gel 15 Gram(s) Oral once PRN Blood Glucose LESS THAN 70 milliGRAM(s)/deciliter  glucagon  Injectable 1 milliGRAM(s) IntraMuscular once PRN Glucose LESS THAN 70 milligrams/deciliter      Vital Signs Last 24 Hrs  T(C): 36.3 (27 Oct 2020 09:35), Max: 36.5 (26 Oct 2020 16:47)  T(F): 97.3 (27 Oct 2020 09:35), Max: 97.7 (26 Oct 2020 16:47)  HR: 95 (27 Oct 2020 09:35) (88 - 105)  BP: 104/70 (27 Oct 2020 09:35) (94/64 - 129/80)  BP(mean): --  RR: 20 (27 Oct 2020 09:35) (18 - 20)  SpO2: 96% (27 Oct 2020 09:35) (96% - 99%)     PHYSICAL EXAM:  GEN: Awake, alert. NAD.   HEENT: NCAT, PERRL, EOMI. Mucosa moist. No JVD.  RESP: Limited inspiratory effort, but grossly CTA b/l  CV: RRR. +S1 and S2. 3/6 sys murmur at apex.  ABD: Soft. NT/ND. BS+  EXT: Warm. 2+ pitting edema to above knees (improving), +venous stasis w/chronic skin changes   NEURO: AAOx3. No focal deficits.     LABS:                        13.1   8.36  )-----------( 139      ( 27 Oct 2020 08:21 )             41.5     10-27    142  |  100  |  28<H>  ----------------------------<  358<H>  3.8   |  31  |  0.76    Ca    9.0      27 Oct 2020 08:21  Phos  3.5     10-26  Mg     2.2     10-27    TPro  6.3  /  Alb  3.2<L>  /  TBili  0.8  /  DBili  x   /  AST  32  /  ALT  36  /  AlkPhos  125<H>  10-26        PTT - ( 26 Oct 2020 05:59 )  PTT:35.2 sec    I&O's Summary    26 Oct 2020 07:01  -  27 Oct 2020 07:00  --------------------------------------------------------  IN: 776 mL / OUT: 3300 mL / NET: -2524 mL      BNP  RADIOLOGY & ADDITIONAL STUDIES:    TELEMETRY:    EKG:

## 2020-10-27 NOTE — DISCHARGE NOTE PROVIDER - NSDCMRMEDTOKEN_GEN_ALL_CORE_FT
finasteride 5 mg oral tablet: 1 tab(s) orally once a day  folic acid 1 mg oral tablet: 1 tab(s) orally once a day  furosemide: 20 milligram(s) orally once a day  Keppra 750 mg oral tablet: 1 tab(s) orally 2 times a day  methotrexate 2.5 mg oral tablet: 7.5 milligram(s) orally once a week  Metoprolol Tartrate 25 mg oral tablet: 1 tab(s) orally 2 times a day  predniSONE 1 mg oral tablet: 4 tab(s) orally 2 times a day  saccharomyces boulardii lyo 250 mg oral capsule: 1 cap(s) orally once a day  tamsulosin 0.4 mg oral capsule: 1 cap(s) orally once a day  Xarelto 10 mg oral tablet: 1 tab(s) orally once a day   acetaZOLAMIDE 250 mg oral tablet: 1 tab(s) orally every 12 hours  cefTRIAXone 2 g intravenous injection: 2 gram(s) intravenous every 24 hours, last dose 11/5  finasteride 5 mg oral tablet: 1 tab(s) orally once a day  folic acid 1 mg oral tablet: 1 tab(s) orally once a day  furosemide 100 mg/100 mL-0.9% intravenous solution: 40 milliliter(s) intravenous every 12 hours  Keppra 750 mg oral tablet: 1 tab(s) orally 2 times a day  methotrexate 2.5 mg oral tablet: 7.5 milligram(s) orally once a week (on Fridays)  Metoprolol Tartrate 25 mg oral tablet: 1 tab(s) orally 2 times a day    hold for SBP&lt;110, HR&lt;60  Multiple Vitamins oral tablet, chewable: 1 tab(s) orally once a day  polyethylene glycol 3350 oral powder for reconstitution: 17 gram(s) orally once a day  predniSONE 1 mg oral tablet: 4 tab(s) orally 2 times a day  saccharomyces boulardii lyo 250 mg oral capsule: 1 cap(s) orally once a day  senna oral tablet: 2 tab(s) orally once a day (at bedtime)  tamsulosin 0.4 mg oral capsule: 1 cap(s) orally once a day  Xarelto 10 mg oral tablet: 1 tab(s) orally once a day   acetaZOLAMIDE 250 mg oral tablet: 1 tab(s) orally every 12 hours  cefTRIAXone 2 g intravenous injection: 2 gram(s) intravenous every 24 hours, last dose 11/5  finasteride 5 mg oral tablet: 1 tab(s) orally once a day  folic acid 1 mg oral tablet: 1 tab(s) orally once a day  furosemide 10 mg/mL injectable solution: 40 milligram(s) intravenous 2 times a day  IF CANNOT GIVE IV LASIX- THEN PLEASE GIVE 40mg PO TORSEMIDE TWICE A DAY  Keppra 750 mg oral tablet: 1 tab(s) orally 2 times a day  methotrexate 2.5 mg oral tablet: 7.5 milligram(s) orally once a week (on Fridays)  Metoprolol Tartrate 25 mg oral tablet: 1 tab(s) orally 2 times a day    hold for SBP&lt;110, HR&lt;60  Multiple Vitamins oral tablet, chewable: 1 tab(s) orally once a day  polyethylene glycol 3350 oral powder for reconstitution: 17 gram(s) orally once a day  predniSONE 1 mg oral tablet: 4 tab(s) orally 2 times a day  saccharomyces boulardii lyo 250 mg oral capsule: 1 cap(s) orally once a day  senna oral tablet: 2 tab(s) orally once a day (at bedtime)  tamsulosin 0.4 mg oral capsule: 1 cap(s) orally once a day  Xarelto 10 mg oral tablet: 1 tab(s) orally once a day   acetaZOLAMIDE 250 mg oral tablet: 1 tab(s) orally every 12 hours  cefTRIAXone 2 g intravenous injection: 2 gram(s) intravenous every 24 hours, last dose 11/5  finasteride 5 mg oral tablet: 1 tab(s) orally once a day  folic acid 1 mg oral tablet: 1 tab(s) orally once a day  furosemide 10 mg/mL injectable solution: 40 milligram(s) intravenous 2 times a day  IF CANNOT GIVE IV LASIX- THEN PLEASE GIVE 40mg PO TORSEMIDE TWICE A DAY  insulin lispro (concentrated) 200 units/mL subcutaneous solution: 15 unit(s) subcutaneous 3 times a day (before meals)  Keppra 750 mg oral tablet: 1 tab(s) orally 2 times a day  Lantus 100 units/mL subcutaneous solution: 20 unit(s) subcutaneous once a day (at bedtime)  methotrexate 2.5 mg oral tablet: 7.5 milligram(s) orally once a week (on Fridays)  Metoprolol Tartrate 25 mg oral tablet: 1 tab(s) orally 2 times a day    hold for SBP&lt;110, HR&lt;60  Multiple Vitamins oral tablet, chewable: 1 tab(s) orally once a day  polyethylene glycol 3350 oral powder for reconstitution: 17 gram(s) orally once a day  predniSONE 1 mg oral tablet: 4 tab(s) orally once a day  saccharomyces boulardii lyo 250 mg oral capsule: 1 cap(s) orally once a day  senna oral tablet: 2 tab(s) orally once a day (at bedtime)  tamsulosin 0.4 mg oral capsule: 1 cap(s) orally once a day  Xarelto 10 mg oral tablet: 1 tab(s) orally once a day

## 2020-10-27 NOTE — DISCHARGE NOTE PROVIDER - HOSPITAL COURSE
#Discharge: do not delete    Patient is 89 yo M with past medical history of atrial fibrillation (on Xarelto 10 mg daily), spinal stenosis, bullous pemphigoid, b/l LE insufficiency w/ ulceration, DM, history of seizures, CVA  Presented with altered mental status, found to have Strep bacteremia 2/2 cellulitis vs aspiration pneumonia and HFrEF excerbation.   Problem List/Main Diagnoses (system-based):   Infectious  Severe sepsis.  -2/2 strep bacteremia, most likely from cellulitis  -c/w ceftraixone 2g q24h for total 14 day course (10/23-11/5)  -will continue abx at [facility]  -no more IVF.     Cellulitis of right lower extremity  patient with increased surrounding warmth, erythema, and increased edema RLE>LLE, likely source from ulcerations over time from bullous pemphigoid and venous stasis  -abx as above.     Gram-negative pneumonia.  Possibly aspiration/CAP. Barium swallowing showing aspiration.   s/p vancomycin 1.5g in ED  -abx as above  -azithromycin dc'd as patient legionella neg  -patient desatting, requiring up to 4L NC, can DC to [facility] w/ oxygen    Pulmnology  Acute hypoxemic respiratory failure likely 2/2 HF exacerbation  -f/u HF recs: was on lasix 60 TID but hypernatremic and alkalotic w/ minimal PO intake over weekend  -40mg IVP BID and diamox 250mg PO BID for metabolic alkalosis  -patient desatted on RA, requiring up to 4L NC, can DC to [facility] w/ oxygen    Cardiovascular  Heart failure.  TTE (10/23) showing LV EF49%, moderately dilated left atrium, mildly dilated right atrium, mildly dilated aortic root, 3.8 cm, mildly dilated proximal ascending aorta, 3.7 cm, severe aortic stenosis, mild aortic regurgitation, at least moderate mitral regurgitation, likely posterior leaflet prolapse, mild-to-moderate mitral stenosis, mild tricuspid regurgitation, no evidence of pulmonary hypertension  -plan as above  -per HF patient poor candidate for intervention.     Atrial fibrillation.  Patient w/ afib, on low dose Xarelto, 10 mg daily  - c/w Xarelto 10 mg PO q24h  - rate control w/ [id number] mg BID w/ hold parameters.     Neuro  Metabolic encephalopathy  likely 2/2 severe sepsis  -CT head neg  -Improved during admission    Dysphagia  -Barium study showing aspiration  -S&S: nectar thick liquid only, spoon only. no cups or straws.  -GOC w/ family, does not want feeding tube and would want comfort feeds.    Endocrinology  Diabetes mellitus.  Currently on lantus 15u and lispro 12u tid  -    Rheumatologic  Bullous pemphigoid.  on weekly methotrexate 7.5 mg (on Fridays), and a prednisone taper (previously on 5 mg BID, now on 4 mg BID for 4 weeks)  -prednisone 4mg PO BID.     Inpatient treatment course:   10/23: echo 49% EF with severe AS. MOLST completed, patient DNR/DNI. Per wife, no CT surgery for AS. blood cultures + for streptococcus. urine legionella negative, azithro d/c'd.  10/24: started on lasix 60mg IVP tid per cards  10/25: Lasix 40x 1 given and diamox for contraction alkalosis. Cardio recs 450 TID - gave 2 doses.   10/26: patient needs MALOU for IV antibx. IV 40mg Lasix BID, Diamox 250mg BID. Palliative saying no PEG tube for nutrition.   10/27: lasix 40mg BID and diamox 250mg BID  New medications: ceftriaxone 2g q24h until 11/5  Labs to be followed outpatient:   Exam to be followed outpatient:    #Discharge: do not delete    Patient is 89 yo M with past medical history of atrial fibrillation (on Xarelto 10 mg daily), spinal stenosis, bullous pemphigoid, b/l LE insufficiency w/ ulceration, DM, history of seizures, CVA  Presented with altered mental status, found to have Strep bacteremia 2/2 cellulitis vs aspiration pneumonia and HFrEF excerbation.   Problem List/Main Diagnoses (system-based):   Infectious  Severe sepsis.  -2/2 strep bacteremia, most likely from cellulitis  -c/w ceftraixone 2g q24h for total 14 day course (10/23-11/5)  -will continue abx at [facility]  -no more IVF.     Cellulitis of right lower extremity  patient with increased surrounding warmth, erythema, and increased edema RLE>LLE, likely source from ulcerations over time from bullous pemphigoid and venous stasis  -abx as above.     Gram-negative pneumonia.  Possibly aspiration/CAP. Barium swallowing showing aspiration.   s/p vancomycin 1.5g in ED  -abx as above  -azithromycin dc'd as patient legionella neg  -patient desatting, requiring up to 4L NC, can DC to [facility] w/ oxygen    Pulmnology  Acute hypoxemic respiratory failure likely 2/2 HF exacerbation  -f/u HF recs: was on lasix 60 TID but hypernatremic and alkalotic w/ minimal PO intake over weekend  -40mg IVP BID and diamox 250mg PO BID for metabolic alkalosis  -patient desatted on RA, requiring up to 4L NC, can DC to [facility] w/ oxygen    Cardiovascular  Heart failure.  TTE (10/23) showing LV EF49%, moderately dilated left atrium, mildly dilated right atrium, mildly dilated aortic root, 3.8 cm, mildly dilated proximal ascending aorta, 3.7 cm, severe aortic stenosis, mild aortic regurgitation, at least moderate mitral regurgitation, likely posterior leaflet prolapse, mild-to-moderate mitral stenosis, mild tricuspid regurgitation, no evidence of pulmonary hypertension  -plan as above  -per HF patient poor candidate for intervention.     Atrial fibrillation.  Patient w/ afib, on low dose Xarelto, 10 mg daily  - c/w Xarelto 10 mg PO q24h  - rate control w/ [id number] mg BID w/ hold parameters.     Neuro  Metabolic encephalopathy  likely 2/2 severe sepsis  -CT head neg  -Improved during admission    Dysphagia  -Barium study showing aspiration  -S&S: nectar thick liquid only, spoon only. no cups or straws.  -GOC w/ family, does not want feeding tube and would want comfort feeds.    Endocrinology  Diabetes mellitus.  Currently on lantus 15u and lispro 12u tid  -    Rheumatologic  Bullous pemphigoid.  on weekly methotrexate 7.5 mg (on Fridays), and a prednisone taper (previously on 5 mg BID, now on 4 mg BID for 4 weeks)  -prednisone 4mg PO BID.     Inpatient treatment course:   10/23: echo 49% EF with severe AS. MOLST completed, patient DNR/DNI. Per wife, no CT surgery for AS. blood cultures + for streptococcus. urine legionella negative, azithro d/c'd.  10/24: started on lasix 60mg IVP tid per cards  10/25: Lasix 40x 1 given and diamox for contraction alkalosis. Cardio recs 450 TID - gave 2 doses.   10/26: patient needs MALOU for IV antibx. IV 40mg Lasix BID, Diamox 250mg BID. Palliative saying no PEG tube for nutrition.   10/27: lasix 40mg BID and diamox 250mg BID  New medications: ceftriaxone 2g q24h until 11/5  Labs to be followed outpatient:   Exam to be followed outpatient:

## 2020-10-27 NOTE — PROGRESS NOTE ADULT - PROBLEM SELECTOR PROBLEM 2
Acute hypoxemic respiratory failure
Acute hypoxemic respiratory failure
Severe sepsis
Acute hypoxemic respiratory failure

## 2020-10-27 NOTE — PROGRESS NOTE ADULT - PROBLEM SELECTOR PLAN 6
-10/26 [facility] note  -Molst as above
-for barium monday, f/u s/s recs  -GOC w/ family about comfort feeds, patient asks for liquids but aspirates
hx of bullous pemphigoid, on weekly methotrexate 7.5 mg (on Fridays), and a prednisone taper (previously on 5 mg BID, now on 4 mg BID for 4 weeks)  - c/w prednisone 4mg PO BID  - obtain more collateral from PCP  - holding methotrexate 7.5 mg weekly (dosed Fridays)
-Barium study showing aspiration  -S&S: nectar thick liquid only, spoon only. no cups or straws.  -GOC w/ family, does not want feeding tube and would want comfort feeds

## 2020-10-27 NOTE — PROGRESS NOTE ADULT - PROBLEM SELECTOR PROBLEM 4
Bullous pemphigoid
Cellulitis of right lower extremity
Heart failure
Cellulitis of right lower extremity

## 2020-10-27 NOTE — DISCHARGE NOTE PROVIDER - PROVIDER TOKENS
FREE:[LAST:[Siegler],FIRST:[Verena],PHONE:[phone]:[phone]],ESTABLISHEDPATIENT:[T]],FREE:[LAST:[Gerald],FIRST:[Ruel],PHONE:[phone]],FAX:[(   )    -],ADDRESS:[[address] [place] [address]],ESTABLISHEDPATIENT:[T]] FREE:[LAST:[Siegler],FIRST:[Verena],PHONE:[phone]:[phone]],SCHEDULEDAPPT:[11/06/2020],SCHEDULEDAPPTTIME:[01:00 PM],ESTABLISHEDPATIENT:[T]],FREE:[LAST:[Gerald],FIRST:[Ruel],PHONE:[phone]],FAX:[(   )    -],ADDRESS:[[address] [place] [address]],ESTABLISHEDPATIENT:[T]] FREE:[LAST:[Siegler],FIRST:[Verena],PHONE:[phone]:[phone]],SCHEDULEDAPPT:[11/06/2020],SCHEDULEDAPPTTIME:[01:00 PM],ESTABLISHEDPATIENT:[T]],FREE:[LAST:[Gerald],FIRST:[Ruel],PHONE:[phone]],FAX:[(   )    -],ADDRESS:[[address] [place] [address]],ESTABLISHEDPATIENT:[T]],FREE:[LAST:[Division of Endocrinology & Metabolism],PHONE:[phone]],FAX:[(   )    -],ADDRESS:[[address] [place] [address]],FOLLOWUP:[2 weeks]]

## 2020-10-28 ENCOUNTER — NON-APPOINTMENT (OUTPATIENT)
Age: 85
End: 2020-10-28

## 2020-10-29 LAB
CULTURE RESULTS: SIGNIFICANT CHANGE UP
SPECIMEN SOURCE: SIGNIFICANT CHANGE UP

## 2020-11-02 DIAGNOSIS — A40.9 STREPTOCOCCAL SEPSIS, UNSPECIFIED: ICD-10-CM

## 2020-11-02 DIAGNOSIS — I50.21 ACUTE SYSTOLIC (CONGESTIVE) HEART FAILURE: ICD-10-CM

## 2020-11-02 DIAGNOSIS — Z66 DO NOT RESUSCITATE: ICD-10-CM

## 2020-11-02 DIAGNOSIS — L12.0 BULLOUS PEMPHIGOID: ICD-10-CM

## 2020-11-02 DIAGNOSIS — L97.919 NON-PRESSURE CHRONIC ULCER OF UNSPECIFIED PART OF RIGHT LOWER LEG WITH UNSPECIFIED SEVERITY: ICD-10-CM

## 2020-11-02 DIAGNOSIS — E66.9 OBESITY, UNSPECIFIED: ICD-10-CM

## 2020-11-02 DIAGNOSIS — E11.65 TYPE 2 DIABETES MELLITUS WITH HYPERGLYCEMIA: ICD-10-CM

## 2020-11-02 DIAGNOSIS — I48.91 UNSPECIFIED ATRIAL FIBRILLATION: ICD-10-CM

## 2020-11-02 DIAGNOSIS — G93.41 METABOLIC ENCEPHALOPATHY: ICD-10-CM

## 2020-11-02 DIAGNOSIS — J96.01 ACUTE RESPIRATORY FAILURE WITH HYPOXIA: ICD-10-CM

## 2020-11-02 DIAGNOSIS — D69.6 THROMBOCYTOPENIA, UNSPECIFIED: ICD-10-CM

## 2020-11-02 DIAGNOSIS — L97.929 NON-PRESSURE CHRONIC ULCER OF UNSPECIFIED PART OF LEFT LOWER LEG WITH UNSPECIFIED SEVERITY: ICD-10-CM

## 2020-11-02 DIAGNOSIS — R13.10 DYSPHAGIA, UNSPECIFIED: ICD-10-CM

## 2020-11-02 DIAGNOSIS — L03.90 CELLULITIS, UNSPECIFIED: ICD-10-CM

## 2020-11-02 DIAGNOSIS — Z79.01 LONG TERM (CURRENT) USE OF ANTICOAGULANTS: ICD-10-CM

## 2020-11-02 DIAGNOSIS — Z51.5 ENCOUNTER FOR PALLIATIVE CARE: ICD-10-CM

## 2020-11-02 DIAGNOSIS — E11.622 TYPE 2 DIABETES MELLITUS WITH OTHER SKIN ULCER: ICD-10-CM

## 2020-11-02 DIAGNOSIS — Z79.52 LONG TERM (CURRENT) USE OF SYSTEMIC STEROIDS: ICD-10-CM

## 2020-11-02 DIAGNOSIS — R65.20 SEVERE SEPSIS WITHOUT SEPTIC SHOCK: ICD-10-CM

## 2020-11-02 DIAGNOSIS — G40.909 EPILEPSY, UNSPECIFIED, NOT INTRACTABLE, WITHOUT STATUS EPILEPTICUS: ICD-10-CM

## 2020-11-13 PROBLEM — I63.9 CEREBRAL INFARCTION, UNSPECIFIED: Chronic | Status: ACTIVE | Noted: 2020-10-23

## 2020-11-13 PROBLEM — G40.909 EPILEPSY, UNSPECIFIED, NOT INTRACTABLE, WITHOUT STATUS EPILEPTICUS: Chronic | Status: ACTIVE | Noted: 2020-10-23

## 2020-11-13 PROBLEM — L12.0 BULLOUS PEMPHIGOID: Chronic | Status: ACTIVE | Noted: 2020-10-23

## 2020-11-13 PROBLEM — I87.2 VENOUS INSUFFICIENCY (CHRONIC) (PERIPHERAL): Chronic | Status: ACTIVE | Noted: 2020-10-23

## 2020-11-13 PROBLEM — I48.91 UNSPECIFIED ATRIAL FIBRILLATION: Chronic | Status: ACTIVE | Noted: 2020-10-23

## 2020-11-13 PROBLEM — M48.00 SPINAL STENOSIS, SITE UNSPECIFIED: Chronic | Status: ACTIVE | Noted: 2020-10-23

## 2020-12-29 ENCOUNTER — APPOINTMENT (OUTPATIENT)
Dept: ENDOCRINOLOGY | Facility: CLINIC | Age: 85
End: 2020-12-29

## 2021-01-10 NOTE — PROGRESS NOTE ADULT - ATTENDING COMMENTS
89M with atrial fibrillation (on Xarelto 10 mg daily), spinal stenosis, bullous pemphigoid, b/l LE insufficiency w/ ulceration, DM, history of seizures, CVA, presents Strep bacteremia 2/2 cellulitis and HFrEF excerbation.   Patient confused on exam today, with no complaints but felt tired  Pt aaox1-2, frail, NAD, NC/AT, PERRL, EOMI, Neck supple no JVD, CVS: Nml S1S2 irregularly irregular +murmur, Pulm bibasilar crackles, poor inspiratory effort, Abd Soft NTND +BS, Ext normal pulses 2+ pitting edema, Neuro no focal deficits moves all four extremities, skin RLE w/ erythema from mid thigh down to foot  - Severe sepsis: -2/2 strep bacteremia from cellulitis, CW CTX, will need 2 weeks. Depending on dispo will place either midline for IV home infusion vs MALOU   - Acute hypoxemic respiratory failure.  2/2 HF exacerbation, HF following, rec diamox 250cc BID with IV lasix 40IV BID, monitor UOP  - A fib: cw xarelto and BB  - Bullous pemphigoid.  c/w prednisone 4mg PO BID  - DM: uncontrolled this AM, increase premeal to 12 units, will increased Bedtime- will require 16 per needs yesterday however will f/u endo recs   - Dysphagia: GOC w/ family about comfort feeds, patient asks for liquids but aspirates, palliative consult
A/P 89yMale with hx of DM presenting for management of AMS with continued prandial hyperglycemia    1.  DM: type 2, uncontrolled  lantus 10 at bedtime  lispro 12 untis tid with meals  Continue lispro moderate dose scale with meals and at bedtime.   Continue consistent carb diet  FSG Goal 100-180    2.  Hyperlipidemia - goal LDL < 70  check lipids    3.  Obesity - outpatient medical management.    consider GLP-1 agonist    Pt is advised to follow up with me at discharge by calling .      Linda Egan MD, PhD  Endocrinology  [address]  [place] [address]  [phone] Tel  [phone] Fax  reception@"[email or web address]"
A/P 89yMale with hx of DM presenting for management of AMS with steroid induced hyperglycemia    1.  DM: type 2, uncontrolled  lantus 10 at bedtime  lispro 8 units tid with meals.   Continue lispro moderate dose scale with meals and at bedtime.   Continue consistent carb diet  FSG Goal 100-180    2.  Hyperlipidemia - goal LDL < 70  check lipids    3.  Obesity - outpatient medical management.    consider GLP-1 agonist.     Pt is advised to follow up with me at discharge by calling .      Linda Egan MD, PhD  Endocrinology  [address]  [place] [address]  [phone] Tel  [phone] Fax  [email or web address]
Agree to continue mild diuresis as pt still overloaded.
89M with atrial fibrillation (on Xarelto 10 mg daily), spinal stenosis, bullous pemphigoid, b/l LE insufficiency w/ ulceration, DM, history of seizures presents with altered mental status and fever. Nursing home resident.  Adm to [facility] w/concerns for severe sepsis 2/2 gram (+) cocci bacteremia. Heart failure consulted for management recommendations.    #HFmrEF Exacerbation  - TTE: Norm LV size, mild concentric LVH, mild LVSD (EF 49%). Severe AS w/LFLG (Gmean 17, GRACE 1, DI 0.19, SVI 25). Mild AI. At least mod MR w/likely posterior MV leaflet prolapse, mild-mod MS (Gmean 5.7 @76), and mod LAE (ROMEO 42). Significant MAC. PASP 34. Norm RV size w/RVSD (TAPSE 14, S' 8.6). Mild TR w/mild VIBHA. Mild ao root (3.8cm) and prox asc ao dilation (3.7cm).  - Pt responding well to diuresis, still appears overloaded on exam w/mild hypoxic resp failure.. Would c/w Lasix 40mg IVP BID for today and can start Diamox 250mg PO BID as well to prevent loop diuretic-assoc metabolic alkalosis. Monitor I/O, lytes, replete PRN. Consider ACE-wraps for chronic venous insufficiency  - Multiple valvular abn. Likely paradoxical LFLG AS w/reduced GRACE and DI but low Gmean due to dec SVI. At least mod MR w/likely posterior leaflet prolapse and mild-mod MS. Given advanced age, multiple comorbidities, and deconditioning, would recommend GOC discussion w/pt and family prior to any further w/u. Would likely be poor candidate for intervention.  - Mild dilation of ao root and prox asc ao on TTE. Outpatient f/u      I have personally provided 30 minutes of clinical care time concurrently with the fellow.  This time excludes time spent on separate procedures and time spent teaching. I have reviewed the fellow’s documentation and I agree with the fellow’s assessment and plan of care.  JOSE Miller
Agree to continue diuresis in patient.    Pt still with LE edema.
The patient was seen and evaluated at the bedside with endocrinology Fellow, Dr. GALLO. His liquid supplements seem to be giving him high glucose levels. Glucose was 176-256 last night and 265-209 today. I agree with an increase of Glargine Insulin to 15 units with 12 units Lispro Insulin premeals.
89M with atrial fibrillation (on Xarelto 10 mg daily), spinal stenosis, bullous pemphigoid, b/l LE insufficiency w/ ulceration, DM, history of seizures presents with altered mental status and fever. Adm to [facility] w/concerns for severe sepsis 2/2 gram (+) cocci bacteremia. Heart failure consulted for management recommendations.    #HFmrEF Exacerbation  - TTE: Norm LV size, mild concentric LVH, mild LVSD (EF 49%). Severe AS w/LFLG (Gmean 17, GRACE 1, DI 0.19, SVI 25). Mild AI. At least mod MR w/likely posterior MV leaflet prolapse, mild-mod MS (Gmean 5.7 @76), and mod LAE (ROMEO 42). Significant MAC. PASP 34. Norm RV size w/RVSD (TAPSE 14, S' 8.6). Mild TR w/mild VIBHA. Mild ao root (3.8cm) and prox asc ao dilation (3.7cm).  - Pt responding well to diuresis (net -2.5L o/n), still appears overloaded on exam w/mild hypoxic resp failure (satting 92% on 2LNC during exam). Would c/w Lasix 40mg IVP BID and Diamox 250mg PO BID as well to prevent loop diuretic-assoc metabolic alkalosis. Monitor I/O, lytes, replete PRN. ACE-wraps for chronic venous insufficiency  - Multiple valvular abn. Likely paradoxical LFLG AS w/reduced GRACE and DI but low Gmean due to dec SVI. At least mod MR w/likely posterior leaflet prolapse and mild-mod MS. Given advanced age, multiple comorbidities, and deconditioning, would recommend GOC discussion w/pt and family prior to any further w/u.  poor candidate for intervention.  - Mild dilation of ao root and prox asc ao on TTE. Outpatient f/u
Patient seen and examined at bedside. Agree with exam, a/p as above with the following addendum/edits:     AAOx3. Knows he is in the hospital but is not sure why. Breathing is not labored but still on O2 and w/ crackles at b/l bases. Appreciate HF recs given severe AS and MR, would c/w lasix cautiously. Pending surveillance blood cultures for strep bacteremia 2/2 cellulitis. C/w ceftriaxone. High risk of aspiration but he is asking to eat. S/s recommends thick nectar and barium swallow Monday. In the interim will feed patient in compliance with his wishes and will also inform family of risk of aspiration. Should be seen by palliative care Monday. Appreciate endo recs regarding elevated FS. Add oral hygiene as well.
pt aox3, report given to HEIDI Tim

## 2021-06-17 NOTE — PHYSICAL THERAPY INITIAL EVALUATION ADULT - TRANSFER SKILLS, REHAB EVAL
No pertinent past medical history needs device/independent no cough/no wheezing/no hemoptysis/no orthopnea

## 2024-01-02 NOTE — CONSULT NOTE ADULT - CONSULT REASON
Sepsis [FreeTextEntry3] : All medical record entries made by the Scribe were at my, Dr. Iftikhar Navarro's, direction and personally dictated by me on 01/02/2024. I have reviewed the chart and agree that the record accurately reflects my personal performance of the history, physical exam, assessment and plan. I have also personally directed, reviewed, and agreed with the chart.

## 2024-01-18 NOTE — PROGRESS NOTE ADULT - PROBLEM/PLAN-5
DISPLAY PLAN FREE TEXT
[Negative] : Heme/Lymph

## 2025-07-20 NOTE — PATIENT PROFILE ADULT - NSPRESCRALCFREQ_GEN_A_NUR
Nursing Suicide Assessment Note - Inpatient    Current assessment:  Patient was sitting in a chair in his room during time of assessment.  Denied suicidal/homicidal ideations and agreed to alert staff with safety concerns.  Patient went to group programming today.  Patient reported that he was receiving too much messaging from the universe.  He also stated that he thinks our facility is compromised and someone in it is going to harm him and that he will stay awake as long as he can.  He refused to take his scheduled trazodone because he does not want to lose awareness of his surroundings.  Writer re-assured him that he is safe here and asked if there was anything we could do to make him feel safe.  He stated that there nothing that we could do. No further nursing interventions required at this time.  Continue with 15 minute safety checks and safety concerns.        Current C-SSRS score: Negative Screen - White      Protective Factors / Reason for Living: Other (work)    Interventions:   Maintain current plan of care.       unable to assess